# Patient Record
Sex: FEMALE | Race: WHITE | NOT HISPANIC OR LATINO | Employment: FULL TIME | URBAN - METROPOLITAN AREA
[De-identification: names, ages, dates, MRNs, and addresses within clinical notes are randomized per-mention and may not be internally consistent; named-entity substitution may affect disease eponyms.]

---

## 2017-01-14 ENCOUNTER — OFFICE VISIT (OUTPATIENT)
Dept: FAMILY MEDICINE CLINIC | Facility: CLINIC | Age: 57
End: 2017-01-14

## 2017-01-14 VITALS
DIASTOLIC BLOOD PRESSURE: 78 MMHG | HEART RATE: 74 BPM | HEIGHT: 68 IN | BODY MASS INDEX: 35.15 KG/M2 | TEMPERATURE: 97.7 F | WEIGHT: 231.9 LBS | SYSTOLIC BLOOD PRESSURE: 110 MMHG | OXYGEN SATURATION: 99 %

## 2017-01-14 DIAGNOSIS — I10 HYPERTENSION, ESSENTIAL: ICD-10-CM

## 2017-01-14 DIAGNOSIS — E78.49 OTHER HYPERLIPIDEMIA: ICD-10-CM

## 2017-01-14 DIAGNOSIS — E11.9 DIABETES MELLITUS WITHOUT COMPLICATION (HCC): Primary | ICD-10-CM

## 2017-01-14 PROCEDURE — 99214 OFFICE O/P EST MOD 30 MIN: CPT | Performed by: INTERNAL MEDICINE

## 2017-01-14 RX ORDER — LEVOTHYROXINE SODIUM 0.12 MG/1
125 TABLET ORAL DAILY
Qty: 30 TABLET | Refills: 5 | Status: SHIPPED | OUTPATIENT
Start: 2017-01-14 | End: 2017-10-12 | Stop reason: SDUPTHER

## 2017-01-14 RX ORDER — OXYBUTYNIN CHLORIDE 5 MG/1
5 TABLET ORAL 3 TIMES DAILY
Qty: 30 TABLET | Refills: 5 | Status: SHIPPED | OUTPATIENT
Start: 2017-01-14 | End: 2019-01-21

## 2017-01-14 RX ORDER — CITALOPRAM 40 MG/1
40 TABLET ORAL DAILY
Qty: 30 TABLET | Refills: 5 | Status: SHIPPED | OUTPATIENT
Start: 2017-01-14 | End: 2017-09-16 | Stop reason: SDUPTHER

## 2017-01-14 RX ORDER — GLIMEPIRIDE 4 MG/1
4 TABLET ORAL 2 TIMES DAILY
Qty: 60 TABLET | Refills: 5 | Status: SHIPPED | OUTPATIENT
Start: 2017-01-14 | End: 2017-11-12 | Stop reason: SDUPTHER

## 2017-01-14 RX ORDER — ATORVASTATIN CALCIUM 20 MG/1
20 TABLET, FILM COATED ORAL DAILY
Qty: 30 TABLET | Refills: 5 | Status: SHIPPED | OUTPATIENT
Start: 2017-01-14 | End: 2018-03-30 | Stop reason: SDUPTHER

## 2017-01-14 RX ORDER — CITALOPRAM 20 MG/1
20 TABLET ORAL DAILY
Qty: 30 TABLET | Refills: 5 | Status: SHIPPED | OUTPATIENT
Start: 2017-01-14 | End: 2017-09-16 | Stop reason: SDUPTHER

## 2017-01-14 RX ORDER — BUSPIRONE HYDROCHLORIDE 5 MG/1
5 TABLET ORAL DAILY
Qty: 30 TABLET | Refills: 5 | Status: SHIPPED | OUTPATIENT
Start: 2017-01-14 | End: 2017-11-12 | Stop reason: SDUPTHER

## 2017-01-14 NOTE — PROGRESS NOTES
Subjective   Eunice Ennis is a 56 y.o. female.   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  Did get invokana samples which helped she ran a samples on a chance to get back in.  Overall feels fair we did discuss labs that were done a week or so ago.  We will definitely want to restart her invokana.    Review of Systems   All other systems reviewed and are negative.      Objective   Vitals:    01/14/17 0823   BP: 110/78   Pulse: 74   Temp: 97.7 °F (36.5 °C)   SpO2: 99%   Weight: 231 lb 14.4 oz (105 kg)     Physical Exam   Constitutional: She appears well-developed and well-nourished.   Cardiovascular: Normal rate, regular rhythm and normal heart sounds.    Pulmonary/Chest: Effort normal and breath sounds normal.   Abdominal: Soft. Bowel sounds are normal.   Nursing note and vitals reviewed.      No results found for: INR    Procedures    Assessment/Plan   1.  Type 2 diabetes plan we will add invokana get follow-up hemoglobin A1c in 3 months both samples and a coupon were given patient with invokana 100 she is let us know if this does not well covered on her insurance plan with coupon.    2.  High blood pressure continue present medication recheck in 6 months    3.  Hyperlipidemia plan continue present medication recheck in 6 months

## 2017-01-14 NOTE — MR AVS SNAPSHOT
Eunice Ennis   1/14/2017 8:15 AM   Office Visit    Dept Phone:  [phone]   Encounter #:  [id number]    Provider:  Manny Lyn Jr., MD   Department:  [facility]                Your Full Care Plan              Today's Medication Changes          These changes are accurate as of: 1/14/17  8:53 AM.  If you have any questions, ask your nurse or doctor.               Medication(s)that have changed:     atorvastatin 20 MG tablet   Commonly known as:  LIPITOR   Take 1 tablet by mouth Daily.   What changed:  when to take this       busPIRone 5 MG tablet   Commonly known as:  BUSPAR   Take 1 tablet by mouth Daily.   What changed:  See the new instructions.       * citalopram 20 MG tablet   Commonly known as:  CeleXA   Take 1 tablet by mouth Daily.   What changed:  when to take this       * citalopram 40 MG tablet   Commonly known as:  CeleXA   Take 1 tablet by mouth Daily.   What changed:  when to take this       glimepiride 4 MG tablet   Commonly known as:  AMARYL   Take 1 tablet by mouth 2 (Two) Times a Day.   What changed:  See the new instructions.       levothyroxine 125 MCG tablet   Commonly known as:  SYNTHROID, LEVOTHROID   Take 1 tablet by mouth Daily.   What changed:  when to take this       linagliptin 5 MG tablet tablet   Commonly known as:  TRADJENTA   Take 1 tablet by mouth Daily.   What changed:  See the new instructions.       metFORMIN 500 MG tablet   Commonly known as:  GLUCOPHAGE   TAKE 2 TABLETS BY MOUTH TWO TIMES A DAY WITH MEALS   What changed:  See the new instructions.       oxybutynin 5 MG tablet   Commonly known as:  DITROPAN   Take 1 tablet by mouth 3 (Three) Times a Day.   What changed:  See the new instructions.       * Notice:  This list has 2 medication(s) that are the same as other medications prescribed for you. Read the directions carefully, and ask your doctor or other care provider to review them with you.         Stop taking medication(s)listed here:     ACCU-CHEK BOBBY PLUS test strip   Generic drug:  glucose blood           dextromethorphan-guaifenesin  MG per 12 hr tablet   Commonly known as:  MUCINEX DM           mupirocin 2 % nasal ointment   Commonly known as:  BACTROBAN           rosuvastatin 10 MG tablet   Commonly known as:  CRESTOR                Where to Get Your Medications      These medications were sent to [address] [place] [address] AT [facility] & (NADJA STEINBERG) - [phone]  - [phone] [address] [address]     Phone:  [phone]     atorvastatin 20 MG tablet    busPIRone 5 MG tablet    Canagliflozin 100 MG tablet    citalopram 20 MG tablet    citalopram 40 MG tablet    glimepiride 4 MG tablet    levothyroxine 125 MCG tablet    linagliptin 5 MG tablet tablet    metFORMIN 500 MG tablet    oxybutynin 5 MG tablet                  Your Updated Medication List          This list is accurate as of: 1/14/17  8:53 AM.  Always use your most recent med list.                atorvastatin 20 MG tablet   Commonly known as:  LIPITOR   Take 1 tablet by mouth Daily.       budesonide-formoterol 160-4.5 MCG/ACT inhaler   Commonly known as:  SYMBICORT   Inhale 2 puffs 2 (two) times a day.       busPIRone 5 MG tablet   Commonly known as:  BUSPAR   Take 1 tablet by mouth Daily.       Canagliflozin 100 MG tablet   Commonly known as:  INVOKANA   Take 100 mg by mouth Daily.       * citalopram 20 MG tablet   Commonly known as:  CeleXA   Take 1 tablet by mouth Daily.       * citalopram 40 MG tablet   Commonly known as:  CeleXA   Take 1 tablet by mouth Daily.       glimepiride 4 MG tablet   Commonly known as:  AMARYL   Take 1 tablet by mouth 2 (Two) Times a Day.       ibuprofen 800 MG tablet   Commonly known as:  ADVIL,MOTRIN       levothyroxine 125 MCG tablet   Commonly known as:  SYNTHROID, LEVOTHROID   Take 1 tablet by mouth Daily.       linagliptin 5 MG tablet  tablet   Commonly known as:  TRADJENTA   Take 1 tablet by mouth Daily.       loratadine 10 MG tablet   Commonly known as:  CLARITIN   Take 1 tablet by mouth daily.       metFORMIN 500 MG tablet   Commonly known as:  GLUCOPHAGE   TAKE 2 TABLETS BY MOUTH TWO TIMES A DAY WITH MEALS       oxybutynin 5 MG tablet   Commonly known as:  DITROPAN   Take 1 tablet by mouth 3 (Three) Times a Day.       * Notice:  This list has 2 medication(s) that are the same as other medications prescribed for you. Read the directions carefully, and ask your doctor or other care provider to review them with you.            You Were Diagnosed With        Codes Comments    Diabetes mellitus without complication    -  Primary ICD-10-CM: E11.9  ICD-9-CM: 250.00     Hypertension, essential     ICD-10-CM: I10  ICD-9-CM: 401.9     Other hyperlipidemia     ICD-10-CM: E78.4  ICD-9-CM: 272.4       Instructions     None    Patient Instructions History      Upcoming Appointments     Visit Type Date Time Department    OFFICE VISIT 2017  8:15 AM [facility]    OFFICE VISIT 2017  8:15 AM [facility] Signup     [facility] allows you to send messages to your doctor, view your test results, renew your prescriptions, schedule appointments, and more. To sign up, go to [employer] and click on the Sign Up Now link in the New User? box. Enter your [employer] Activation Code exactly as it appears below along with the last four digits of your Social Security Number and your Date of Birth () to complete the sign-up process. If you do not sign up before the expiration date, you must request a new code.    [employer] Activation Code: [email or web address]  Expires: 2017  8:53 AM    If you have questions, you can email [email or web address] or call [phone] to talk to our [employer] staff. Remember, [employer] is NOT to be used for urgent needs. For medical emergencies, dial 911.               Other Info from  "Your Visit           Your Appointments     Apr 22, 2017  8:15 AM EDT   Office Visit with Manny Lyn Jr., MD   [facility] (--)    [address] [place] [id number] [phone]           Arrive 15 minutes prior to appointment.              Allergies     No Known Allergies      Reason for Visit     Diabetes           Vital Signs     Blood Pressure Pulse Temperature Height Weight Oxygen Saturation    110/78 (BP Location: Left arm, Patient Position: Sitting, Cuff Size: Adult) 74 97.7 °F (36.5 °C) (Oral) 68\" (172.7 cm) 231 lb 14.4 oz (105 kg) 99%    Body Mass Index Smoking Status                35.26 kg/m2 Former Smoker          Problems and Diagnoses Noted     Diabetes mellitus without complication    -  Primary    High blood pressure        Other hyperlipidemia            "

## 2017-01-25 RX ORDER — ATORVASTATIN CALCIUM 20 MG/1
TABLET, FILM COATED ORAL
Qty: 30 TABLET | Refills: 2 | Status: SHIPPED | OUTPATIENT
Start: 2017-01-25 | End: 2018-03-15 | Stop reason: SDUPTHER

## 2017-02-13 ENCOUNTER — TELEPHONE (OUTPATIENT)
Dept: FAMILY MEDICINE CLINIC | Facility: CLINIC | Age: 57
End: 2017-02-13

## 2017-03-20 ENCOUNTER — TELEPHONE (OUTPATIENT)
Dept: FAMILY MEDICINE CLINIC | Facility: CLINIC | Age: 57
End: 2017-03-20

## 2017-03-20 NOTE — TELEPHONE ENCOUNTER
LM INFORMING NO SAMPLES COUPON UP FRONT    ----- Message from Berkley Chang sent at 3/20/2017  9:38 AM EDT -----  Contact: PATIENT [phone]  SAMPLES OF INVOKANNA    RX FOR THIS MEDICATION IS OVER $400.00

## 2017-04-03 ENCOUNTER — TELEPHONE (OUTPATIENT)
Dept: FAMILY MEDICINE CLINIC | Facility: CLINIC | Age: 57
End: 2017-04-03

## 2017-04-03 NOTE — TELEPHONE ENCOUNTER
----- Message from Manny Lyn Jr., MD sent at 4/3/2017 12:27 PM EDT -----  Contact: PT  Can give trial of farxiga initially as samples 5 mg initially can also give her prescription for that see if that is better covered by her insurance.  ----- Message -----     From: Carmel Espitia MA     Sent: 4/3/2017  10:34 AM       To: Manny Lyn Jr., MD        ----- Message -----     From: Amy Hill     Sent: 4/3/2017   9:59 AM       To: Carmel Espitia MA    PT SAID HER INVOKANA $ AND SHE CAN'T AFFORD THAT SO SHE NEEDS SOME MORE SAMPLES OR SOMETHING CHEAPER THAT IS EQUIVALENT TO THAT MEDICATION    [facility]    [phone]

## 2017-05-16 ENCOUNTER — TELEPHONE (OUTPATIENT)
Dept: FAMILY MEDICINE CLINIC | Facility: CLINIC | Age: 57
End: 2017-05-16

## 2017-06-28 ENCOUNTER — TELEPHONE (OUTPATIENT)
Dept: FAMILY MEDICINE CLINIC | Facility: CLINIC | Age: 57
End: 2017-06-28

## 2017-06-29 DIAGNOSIS — R77.9 ELEVATED BLOOD PROTEIN: Primary | ICD-10-CM

## 2017-07-08 ENCOUNTER — LAB (OUTPATIENT)
Dept: FAMILY MEDICINE CLINIC | Facility: CLINIC | Age: 57
End: 2017-07-08

## 2017-07-08 DIAGNOSIS — R77.9 ELEVATED BLOOD PROTEIN: ICD-10-CM

## 2017-07-08 PROCEDURE — 36415 COLL VENOUS BLD VENIPUNCTURE: CPT | Performed by: INTERNAL MEDICINE

## 2017-07-10 LAB
ALBUMIN SERPL-MCNC: 3.9 G/DL (ref 2.9–4.4)
ALBUMIN/GLOB SERPL: 1.2 {RATIO} (ref 0.7–1.7)
ALPHA1 GLOB FLD ELPH-MCNC: 0.2 G/DL (ref 0–0.4)
ALPHA2 GLOB SERPL ELPH-MCNC: 0.8 G/DL (ref 0.4–1)
B-GLOBULIN SERPL ELPH-MCNC: 0.9 G/DL (ref 0.7–1.3)
GAMMA GLOB SERPL ELPH-MCNC: 1.5 G/DL (ref 0.4–1.8)
GLOBULIN SER CALC-MCNC: 3.3 G/DL (ref 2.2–3.9)
Lab: NORMAL
M-SPIKE: NORMAL G/DL
PROT PATTERN SERPL ELPH-IMP: NORMAL
PROT SERPL-MCNC: 7.2 G/DL (ref 6–8.5)

## 2017-08-07 ENCOUNTER — TRANSCRIBE ORDERS (OUTPATIENT)
Dept: ADMINISTRATIVE | Facility: HOSPITAL | Age: 57
End: 2017-08-07

## 2017-08-07 DIAGNOSIS — Z12.31 VISIT FOR SCREENING MAMMOGRAM: Primary | ICD-10-CM

## 2017-08-14 RX ORDER — LINAGLIPTIN 5 MG/1
TABLET, FILM COATED ORAL
Qty: 30 TABLET | Refills: 4 | Status: SHIPPED | OUTPATIENT
Start: 2017-08-14 | End: 2018-01-17 | Stop reason: SDUPTHER

## 2017-09-18 RX ORDER — CITALOPRAM 20 MG/1
TABLET ORAL
Qty: 30 TABLET | Refills: 4 | Status: SHIPPED | OUTPATIENT
Start: 2017-09-18 | End: 2018-02-25 | Stop reason: SDUPTHER

## 2017-09-18 RX ORDER — CITALOPRAM 40 MG/1
TABLET ORAL
Qty: 30 TABLET | Refills: 4 | Status: SHIPPED | OUTPATIENT
Start: 2017-09-18 | End: 2018-02-25 | Stop reason: SDUPTHER

## 2017-09-30 ENCOUNTER — HOSPITAL ENCOUNTER (OUTPATIENT)
Dept: MAMMOGRAPHY | Facility: HOSPITAL | Age: 57
Discharge: HOME | End: 2017-09-30
Attending: INTERNAL MEDICINE | Admitting: INTERNAL MEDICINE

## 2017-09-30 DIAGNOSIS — Z12.31 VISIT FOR SCREENING MAMMOGRAM: ICD-10-CM

## 2017-09-30 PROCEDURE — G0202 SCR MAMMO BI INCL CAD: HCPCS

## 2017-09-30 PROCEDURE — 77063 BREAST TOMOSYNTHESIS BI: CPT

## 2017-10-12 RX ORDER — LEVOTHYROXINE SODIUM 0.12 MG/1
TABLET ORAL
Qty: 30 TABLET | Refills: 4 | Status: SHIPPED | OUTPATIENT
Start: 2017-10-12 | End: 2018-05-07 | Stop reason: SDUPTHER

## 2017-11-13 RX ORDER — BUSPIRONE HYDROCHLORIDE 5 MG/1
TABLET ORAL
Qty: 30 TABLET | Refills: 4 | Status: SHIPPED | OUTPATIENT
Start: 2017-11-13 | End: 2018-03-15 | Stop reason: ALTCHOICE

## 2017-11-13 RX ORDER — GLIMEPIRIDE 4 MG/1
TABLET ORAL
Qty: 60 TABLET | Refills: 4 | Status: SHIPPED | OUTPATIENT
Start: 2017-11-13 | End: 2019-01-21

## 2017-12-22 ENCOUNTER — TELEPHONE (OUTPATIENT)
Dept: FAMILY MEDICINE CLINIC | Facility: CLINIC | Age: 57
End: 2017-12-22

## 2017-12-22 RX ORDER — BUDESONIDE AND FORMOTEROL FUMARATE DIHYDRATE 160; 4.5 UG/1; UG/1
2 AEROSOL RESPIRATORY (INHALATION)
Qty: 1 INHALER | Refills: 12 | Status: SHIPPED | OUTPATIENT
Start: 2017-12-22 | End: 2022-07-12

## 2018-01-17 RX ORDER — LINAGLIPTIN 5 MG/1
TABLET, FILM COATED ORAL
Qty: 21 TABLET | Refills: 0 | Status: SHIPPED | OUTPATIENT
Start: 2018-01-17 | End: 2018-02-16 | Stop reason: SDUPTHER

## 2018-02-16 RX ORDER — LINAGLIPTIN 5 MG/1
TABLET, FILM COATED ORAL
Qty: 14 TABLET | Refills: 0 | Status: SHIPPED | OUTPATIENT
Start: 2018-02-16 | End: 2019-01-21

## 2018-02-26 RX ORDER — CITALOPRAM 20 MG/1
TABLET ORAL
Qty: 30 TABLET | Refills: 3 | Status: SHIPPED | OUTPATIENT
Start: 2018-02-26 | End: 2019-01-21

## 2018-02-26 RX ORDER — CITALOPRAM 40 MG/1
TABLET ORAL
Qty: 30 TABLET | Refills: 3 | Status: SHIPPED | OUTPATIENT
Start: 2018-02-26 | End: 2018-03-15 | Stop reason: ALTCHOICE

## 2018-03-15 ENCOUNTER — OFFICE VISIT (OUTPATIENT)
Dept: FAMILY MEDICINE CLINIC | Facility: CLINIC | Age: 58
End: 2018-03-15

## 2018-03-15 VITALS
HEART RATE: 88 BPM | BODY MASS INDEX: 35.71 KG/M2 | TEMPERATURE: 97.7 F | DIASTOLIC BLOOD PRESSURE: 68 MMHG | OXYGEN SATURATION: 98 % | SYSTOLIC BLOOD PRESSURE: 130 MMHG | HEIGHT: 68 IN | WEIGHT: 235.6 LBS

## 2018-03-15 DIAGNOSIS — F32.A DEPRESSION, UNSPECIFIED DEPRESSION TYPE: Primary | ICD-10-CM

## 2018-03-15 PROCEDURE — 99213 OFFICE O/P EST LOW 20 MIN: CPT | Performed by: INTERNAL MEDICINE

## 2018-03-15 RX ORDER — VENLAFAXINE HYDROCHLORIDE 37.5 MG/1
CAPSULE, EXTENDED RELEASE ORAL
Qty: 60 CAPSULE | Refills: 0 | Status: SHIPPED | OUTPATIENT
Start: 2018-03-15 | End: 2018-03-18 | Stop reason: SDUPTHER

## 2018-03-15 RX ORDER — SOLIFENACIN SUCCINATE 5 MG/1
TABLET, FILM COATED ORAL
COMMUNITY
Start: 2018-01-18

## 2018-03-15 RX ORDER — INSULIN DETEMIR 100 [IU]/ML
18 INJECTION, SOLUTION SUBCUTANEOUS
COMMUNITY
Start: 2018-02-12 | End: 2019-01-21

## 2018-03-15 RX ORDER — INSULIN LISPRO 100 [IU]/ML
INJECTION, SOLUTION INTRAVENOUS; SUBCUTANEOUS
COMMUNITY
Start: 2018-02-13 | End: 2022-07-12

## 2018-03-15 NOTE — PROGRESS NOTES
Subjective   Eunice Ennis is a 57 y.o. female.   Patient with increased sleep long-term Celexa and BuSpar for anxiety and depression.  Feels like these are not effective.  History of Present Illness as above has used Zoloft before which did not seem to work well some stressors resulting in some forthcoming legal issues.  Also still dealing with death of her mother from a year ago.  Describes 2017 asked the year from hell.  Mainly in regard to her mother's health.  Although fewer stressors at present.  Increased sleep feeling somewhat down her flat no thoughts of hurting self or hurting others.  Is still taking her Celexa and BuSpar    Review of Systems   Constitutional: Positive for fatigue.   Psychiatric/Behavioral: Positive for agitation and dysphoric mood. The patient is nervous/anxious.    All other systems reviewed and are negative.      Objective   Vitals:    03/15/18 1559   BP: 130/68   Pulse: 88   Temp: 97.7 °F (36.5 °C)   SpO2: 98%   Weight: 107 kg (235 lb 9.6 oz)     Physical Exam   Constitutional: She appears well-developed and well-nourished.   HENT:   Head: Normocephalic and atraumatic.   Cardiovascular: Normal rate, regular rhythm and normal heart sounds.    Pulmonary/Chest: Effort normal and breath sounds normal.   Nursing note and vitals reviewed.      No results found for: INR    Procedures    Assessment/Plan     Depression plan we will start a medication Effexor.    For this patient stop her Celexa and BuSpar for the next 5 days time.  Then begin Effexor X are 37.5 mg daily morning for 1 week then go to 2 tabs every morning quantity 60 call regarding status in one month's time further follow-up contingent on how she does.  If she does have side effects she is let me know.    Much of this encounter note is an electronic transcription/translation of spoken language to printed text.  The electronic translation of spoken language may permit erroneous, or at times, nonsensical words or phrases to be  inadvertently transcribed.  Although I have reviewed the note for such errors, some may still exist.

## 2018-03-19 ENCOUNTER — TELEPHONE (OUTPATIENT)
Dept: FAMILY MEDICINE CLINIC | Facility: CLINIC | Age: 58
End: 2018-03-19

## 2018-03-19 RX ORDER — VENLAFAXINE HYDROCHLORIDE 37.5 MG/1
CAPSULE, EXTENDED RELEASE ORAL
Qty: 7 CAPSULE | Refills: 0 | Status: SHIPPED | OUTPATIENT
Start: 2018-03-19 | End: 2019-02-01

## 2018-03-19 NOTE — TELEPHONE ENCOUNTER
PATIENT CANNOT TAKE EFFEXOR, ITS MAKING HER DIZZY, INSOMNIA AND MAKES HER FEEL LIKE SHE GOT STUNG BY A BEE.

## 2018-03-20 ENCOUNTER — TELEPHONE (OUTPATIENT)
Dept: FAMILY MEDICINE CLINIC | Facility: CLINIC | Age: 58
End: 2018-03-20

## 2018-03-20 NOTE — TELEPHONE ENCOUNTER
PT STATES SHE CANNOT TAKE THE RX VENLAFAXINE XR 37.5 MG  PT STATES THE SIDE EFFECTS FOR HER WERE TOO MUCH TO FUNCTION WITH  DIZZINESS, NO SLEEP, SHAKINESS, NOT ABLE TO DRIVE WITHOUT FALLING ASLEEP   PT ASKING IF THERE IS SOMETHING ELSE THAT SHE CAN TAKE THAT WOULDN'T HAVE THESE SIDE EFFECTS?

## 2018-03-22 RX ORDER — ESCITALOPRAM OXALATE 10 MG/1
10 TABLET ORAL DAILY
Qty: 30 TABLET | Refills: 2 | Status: SHIPPED | OUTPATIENT
Start: 2018-03-22 | End: 2018-04-06 | Stop reason: DRUGHIGH

## 2018-03-22 NOTE — TELEPHONE ENCOUNTER
No Effexor for 5 days then begin Lexapro 10 mg half tablet daily for 1 week then go to whole tablet daily call regarding status in one month's time if side effects beforehand

## 2018-03-22 NOTE — TELEPHONE ENCOUNTER
Confirm that we are going to be getting the INR is in dealing with those now instead of the Coumadin clinic until she is able to go back to the Coumadin clinic regularly?   What else this patient tried, has she tried Fetzima

## 2018-03-30 ENCOUNTER — TELEPHONE (OUTPATIENT)
Dept: FAMILY MEDICINE CLINIC | Facility: CLINIC | Age: 58
End: 2018-03-30

## 2018-03-30 NOTE — TELEPHONE ENCOUNTER
WAS PUT ON LEXAPRO 10 MG FEELS LIKE IT IS WORKING BUT FEELS LIKE IT NEEDS TO BE DOUBLED SHE IS STILL REALLY EDGY AND SNAPPY AT PEOPLE, CAN YOU CALL PT AND ADVISE WHAT TO DO ?

## 2018-03-30 NOTE — TELEPHONE ENCOUNTER
Increase to 15 mg meaning one and 1 half tablets for 2 weeks then go up to 20 mg call  re status 1mo

## 2018-04-02 RX ORDER — ATORVASTATIN CALCIUM 20 MG/1
TABLET, FILM COATED ORAL
Qty: 30 TABLET | Refills: 3 | Status: SHIPPED | OUTPATIENT
Start: 2018-04-02 | End: 2018-09-11 | Stop reason: SDUPTHER

## 2018-04-06 ENCOUNTER — TELEPHONE (OUTPATIENT)
Dept: FAMILY MEDICINE CLINIC | Facility: CLINIC | Age: 58
End: 2018-04-06

## 2018-04-06 RX ORDER — ESCITALOPRAM OXALATE 20 MG/1
20 TABLET ORAL DAILY
Qty: 30 TABLET | Refills: 5 | Status: SHIPPED | OUTPATIENT
Start: 2018-04-06 | End: 2018-12-15 | Stop reason: SDUPTHER

## 2018-04-06 RX ORDER — BUSPIRONE HYDROCHLORIDE 10 MG/1
10 TABLET ORAL 2 TIMES DAILY
Qty: 60 TABLET | Refills: 5 | Status: SHIPPED | OUTPATIENT
Start: 2018-04-06 | End: 2018-12-15 | Stop reason: SDUPTHER

## 2018-04-06 RX ORDER — BUSPIRONE HYDROCHLORIDE 5 MG/1
5 TABLET ORAL DAILY
Qty: 30 TABLET | Refills: 2 | Status: SHIPPED | OUTPATIENT
Start: 2018-04-06 | End: 2018-04-06 | Stop reason: DRUGHIGH

## 2018-04-06 NOTE — TELEPHONE ENCOUNTER
PATIENT IS ON LEXAPRO 20 MG  PATIENT IS STILL SNAPPY AT THE END OF THE DAY. PATIENT TOOK 30 MG YESTERDAY AND FEELS A LITTLE BETTER.    PATIENT NEEDS A REFILL ON LEXAPRO 30 MG

## 2018-04-06 NOTE — TELEPHONE ENCOUNTER
Maximum dose of Lexapro is 20 mg a day so continue on just 20 mg a day.  Can add BuSpar 5 mg as a complementary or Wellbutrin to this for added benefit suggest trying BuSpar 5 mg half tablet twice a day for 1 week in addition to her Lexapro 20 and on the BuSpar after 1 week's time ago to whole tablet twice a day and call us in one month's regarding her status.

## 2018-04-10 RX ORDER — VENLAFAXINE HYDROCHLORIDE 75 MG/1
CAPSULE, EXTENDED RELEASE ORAL
Qty: 30 CAPSULE | Refills: 0 | Status: SHIPPED | OUTPATIENT
Start: 2018-04-10 | End: 2019-01-21

## 2018-05-07 RX ORDER — LEVOTHYROXINE SODIUM 0.12 MG/1
TABLET ORAL
Qty: 30 TABLET | Refills: 3 | Status: SHIPPED | OUTPATIENT
Start: 2018-05-07 | End: 2018-12-15 | Stop reason: SDUPTHER

## 2018-08-08 ENCOUNTER — TRANSCRIBE ORDERS (OUTPATIENT)
Dept: ADMINISTRATIVE | Facility: HOSPITAL | Age: 58
End: 2018-08-08

## 2018-08-08 DIAGNOSIS — Z12.31 VISIT FOR SCREENING MAMMOGRAM: Primary | ICD-10-CM

## 2018-09-11 RX ORDER — ATORVASTATIN CALCIUM 20 MG/1
TABLET, FILM COATED ORAL
Qty: 30 TABLET | Refills: 2 | Status: SHIPPED | OUTPATIENT
Start: 2018-09-11 | End: 2018-11-02 | Stop reason: SDUPTHER

## 2018-10-11 ENCOUNTER — TELEPHONE (OUTPATIENT)
Dept: FAMILY MEDICINE CLINIC | Facility: CLINIC | Age: 58
End: 2018-10-11

## 2018-10-11 NOTE — TELEPHONE ENCOUNTER
PATIENT SEEN DR. MERCHANT AND HAD TAKEN A CYST OF HER BLADDER. DR. MERCHANT PUT PATIENT ON VESICARE 5 MG tablet. PATIENT WANTS TO KNOW IF [facility] COULD REFILL THIS PATIENT NEEDS THIS CALLED INTO PHARMACY

## 2018-10-13 ENCOUNTER — HOSPITAL ENCOUNTER (OUTPATIENT)
Dept: MAMMOGRAPHY | Facility: HOSPITAL | Age: 58
Discharge: HOME | End: 2018-10-13
Attending: INTERNAL MEDICINE | Admitting: INTERNAL MEDICINE

## 2018-10-13 DIAGNOSIS — Z12.31 VISIT FOR SCREENING MAMMOGRAM: ICD-10-CM

## 2018-10-13 PROCEDURE — 77067 SCR MAMMO BI INCL CAD: CPT

## 2018-10-13 PROCEDURE — 77063 BREAST TOMOSYNTHESIS BI: CPT

## 2018-11-02 RX ORDER — ATORVASTATIN CALCIUM 20 MG/1
TABLET, FILM COATED ORAL
Qty: 90 TABLET | Refills: 1 | Status: SHIPPED | OUTPATIENT
Start: 2018-11-02

## 2018-12-17 RX ORDER — LEVOTHYROXINE SODIUM 0.12 MG/1
TABLET ORAL
Qty: 30 TABLET | Refills: 2 | Status: SHIPPED | OUTPATIENT
Start: 2018-12-17 | End: 2019-01-24 | Stop reason: SDUPTHER

## 2018-12-17 RX ORDER — ESCITALOPRAM OXALATE 20 MG/1
TABLET ORAL
Qty: 30 TABLET | Refills: 4 | Status: SHIPPED | OUTPATIENT
Start: 2018-12-17 | End: 2019-02-01

## 2018-12-17 RX ORDER — BUSPIRONE HYDROCHLORIDE 10 MG/1
TABLET ORAL
Qty: 60 TABLET | Refills: 4 | Status: SHIPPED | OUTPATIENT
Start: 2018-12-17 | End: 2019-11-17 | Stop reason: SDUPTHER

## 2019-01-21 ENCOUNTER — OFFICE VISIT (OUTPATIENT)
Dept: FAMILY MEDICINE CLINIC | Facility: CLINIC | Age: 59
End: 2019-01-21

## 2019-01-21 DIAGNOSIS — R10.13 DYSPEPSIA: Primary | ICD-10-CM

## 2019-01-21 DIAGNOSIS — R19.4 CHANGE IN BOWEL HABITS: ICD-10-CM

## 2019-01-21 DIAGNOSIS — E03.9 HYPOTHYROIDISM, UNSPECIFIED TYPE: ICD-10-CM

## 2019-01-21 DIAGNOSIS — F41.9 ANXIETY: ICD-10-CM

## 2019-01-21 LAB
ALBUMIN SERPL-MCNC: 4.3 G/DL (ref 3.5–5.2)
ALBUMIN/GLOB SERPL: 1.1 G/DL
ALP SERPL-CCNC: 79 U/L (ref 39–117)
ALT SERPL W P-5'-P-CCNC: 15 U/L (ref 1–33)
ANION GAP SERPL CALCULATED.3IONS-SCNC: 13.2 MMOL/L
AST SERPL-CCNC: 14 U/L (ref 1–32)
BILIRUB SERPL-MCNC: 0.3 MG/DL (ref 0.1–1.2)
BUN BLD-MCNC: 21 MG/DL (ref 6–20)
BUN/CREAT SERPL: 24.7 (ref 7–25)
CALCIUM SPEC-SCNC: 9.6 MG/DL (ref 8.6–10.5)
CHLORIDE SERPL-SCNC: 99 MMOL/L (ref 98–107)
CO2 SERPL-SCNC: 25.8 MMOL/L (ref 22–29)
CREAT BLD-MCNC: 0.85 MG/DL (ref 0.57–1)
GFR SERPL CREATININE-BSD FRML MDRD: 69 ML/MIN/1.73
GLOBULIN UR ELPH-MCNC: 4 GM/DL
GLUCOSE BLD-MCNC: 117 MG/DL (ref 65–99)
POTASSIUM BLD-SCNC: 3.9 MMOL/L (ref 3.5–5.2)
PROT SERPL-MCNC: 8.3 G/DL (ref 6–8.5)
SODIUM BLD-SCNC: 138 MMOL/L (ref 136–145)
TSH SERPL DL<=0.05 MIU/L-ACNC: 9.09 MIU/ML (ref 0.27–4.2)

## 2019-01-21 PROCEDURE — 99214 OFFICE O/P EST MOD 30 MIN: CPT | Performed by: INTERNAL MEDICINE

## 2019-01-21 PROCEDURE — 80053 COMPREHEN METABOLIC PANEL: CPT | Performed by: INTERNAL MEDICINE

## 2019-01-21 PROCEDURE — 85025 COMPLETE CBC W/AUTO DIFF WBC: CPT | Performed by: INTERNAL MEDICINE

## 2019-01-21 PROCEDURE — 84443 ASSAY THYROID STIM HORMONE: CPT | Performed by: INTERNAL MEDICINE

## 2019-01-21 RX ORDER — HEPATITIS A VACCINE 1440 [IU]/ML
INJECTION, SUSPENSION INTRAMUSCULAR
COMMUNITY
Start: 2018-11-08 | End: 2019-02-01

## 2019-01-21 NOTE — PROGRESS NOTES
Subjective   Eunice Ennis is a 58 y.o. female.    Some change in bowel habits over the last 2-3 months.  Also belching dyspepsia abdominal distention.  History of Present Illness patient really foul gas or belching.  No reported temperature with this does not had colonoscopy done somewhat recent change in bowel habits with mildly constipation.  No blood in the stool was she's trying to soak by history.  Does need thyroid checked today as well.  I haven't feeling somewhat edgy while the BuSpar and Lexapro she brought have helped somewhat she's not totally where she needs to be we will switch her from her Lexapro to trintellix patient is 5 mg and build up to 10 mg do want patient seen GI for further evaluation and probable colonoscopy.  Get screening lab today.  On thyroid medicine needs updated lab check  Family history positive for diabetes and hypertension.  Patient's former smoker quit 1 year ago no heavy alcohol.    Allergies are Septra which causes itching  Dyspepsia recent  Change in bowel  Still feels edgy  w lexapro  Review of Systems   Gastrointestinal: Positive for abdominal distention and constipation.   All other systems reviewed and are negative.      Objective   There were no vitals filed for this visit.      Physical Exam   Constitutional: She appears well-developed and well-nourished.   HENT:   Head: Normocephalic and atraumatic.   Eyes: Conjunctivae are normal. Pupils are equal, round, and reactive to light. No scleral icterus.   Cardiovascular: Normal rate, regular rhythm and normal heart sounds.   Pulmonary/Chest: Effort normal and breath sounds normal.   Abdominal: Soft. Bowel sounds are normal.   Neurological: She is alert.   Unremarkable gait and station   Skin: Skin is warm and dry.   Psychiatric: She has a normal mood and affect. Her behavior is normal.   Nursing note and vitals reviewed.      No results found for: INR    Procedures    Assessment/Plan 1.  Change in bowel habits plan refer to  Dr. Fitch regarding constipation try linzess 72 mg daily    2.  Dyspepsia plan await input from GI await pending lab    3.  Hypothyroidism await pending TSH is satisfactory have patient get rechecked in one years time    4.  Anxiety disorder we will switch patient from Lexapro to trintellix 5 mg for 3 weeks seeing up to 10 mg daily she is stopped Lexapro but continue her BuSpar call regarding status of 1 month and if side effects should occur in the interim    Much of this encounter note is an electronic transcription/translation of spoken language to printed text.  The electronic translation of spoken language may permit erroneous, or at times, nonsensical words or phrases to be inadvertently transcribed.  Although I have reviewed the note for such errors, some may still exist. If there are questions or for further clarification, please contact me.      trintellix  linzess

## 2019-01-22 LAB
ERYTHROCYTE [DISTWIDTH] IN BLOOD BY AUTOMATED COUNT: 12.7 % (ref 4.5–15)
HCT VFR BLD AUTO: 49.1 % (ref 31–42)
HGB BLD-MCNC: 15.7 G/DL (ref 12–18)
LYMPHOCYTES # BLD AUTO: 3.7 10*3/MM3 (ref 1.2–3.4)
LYMPHOCYTES NFR BLD AUTO: 39.1 % (ref 21–51)
MCH RBC QN AUTO: 29.2 PG (ref 26.1–33.1)
MCHC RBC AUTO-ENTMCNC: 32.1 G/DL (ref 33–37)
MCV RBC AUTO: 91.1 FL (ref 80–99)
MONOCYTES # BLD AUTO: 0.4 10*3/MM3 (ref 0.1–0.6)
MONOCYTES NFR BLD AUTO: 4.1 % (ref 2–9)
NEUTROPHILS # BLD AUTO: 5.4 10*3/MM3 (ref 1.4–6.5)
NEUTROPHILS NFR BLD AUTO: 56.8 % (ref 42–75)
PLATELET # BLD AUTO: 242 10*3/MM3 (ref 150–450)
PMV BLD AUTO: 8 FL (ref 7.1–10.5)
RBC # BLD AUTO: 5.39 10*6/MM3 (ref 4–6)
WBC NRBC COR # BLD: 9.5 10*3/MM3 (ref 4.5–10)

## 2019-01-24 DIAGNOSIS — E03.9 HYPOTHYROIDISM, UNSPECIFIED TYPE: Primary | ICD-10-CM

## 2019-01-24 RX ORDER — LEVOTHYROXINE SODIUM 0.15 MG/1
150 TABLET ORAL DAILY
Qty: 30 TABLET | Refills: 1 | Status: SHIPPED | OUTPATIENT
Start: 2019-01-24 | End: 2019-03-21 | Stop reason: SDUPTHER

## 2019-01-25 ENCOUNTER — TELEPHONE (OUTPATIENT)
Dept: FAMILY MEDICINE CLINIC | Facility: CLINIC | Age: 59
End: 2019-01-25

## 2019-01-25 NOTE — TELEPHONE ENCOUNTER
[facility] or the generic version

## 2019-01-25 NOTE — TELEPHONE ENCOUNTER
Was given linzess for constipation and has been using the bathroom so much she is having rectal pain/raw can you send in a cream?

## 2019-01-31 ENCOUNTER — TELEPHONE (OUTPATIENT)
Dept: FAMILY MEDICINE CLINIC | Facility: CLINIC | Age: 59
End: 2019-01-31

## 2019-01-31 NOTE — TELEPHONE ENCOUNTER
All but 1 SSRIs including Zoloft and Prozac Paxil Celexa we also have Wellbutrin in there so which of these has she tried an which these worked before as a substitute.

## 2019-01-31 NOTE — TELEPHONE ENCOUNTER
PT CALLED [facility] BECAUSE [facility] WILL NOT COVER TRINTELLIX. SO THEY GAVE HER 5 SIMILAR MEDS THAT THEY DO COVER TO TELL BTI    FLUOXETINE  PAROXETINE  BUPROPION  CITALOPRAM  SERTRALINE

## 2019-02-01 ENCOUNTER — TELEPHONE (OUTPATIENT)
Dept: FAMILY MEDICINE CLINIC | Facility: CLINIC | Age: 59
End: 2019-02-01

## 2019-02-01 RX ORDER — CITALOPRAM 40 MG/1
40 TABLET ORAL DAILY
Qty: 30 TABLET | Refills: 5 | Status: SHIPPED | OUTPATIENT
Start: 2019-02-01 | End: 2019-02-01

## 2019-02-01 RX ORDER — PAROXETINE 10 MG/1
10 TABLET, FILM COATED ORAL EVERY MORNING
Qty: 30 TABLET | Refills: 5 | Status: SHIPPED | OUTPATIENT
Start: 2019-02-01 | End: 2019-09-24 | Stop reason: ALTCHOICE

## 2019-02-01 NOTE — TELEPHONE ENCOUNTER
She is not weaning from Paxil until she is been on it a higher dose than 10 mg then would need to wean down if medicines stopped.  My  point is that this is one of these medications 1 really does need to wean down from

## 2019-02-01 NOTE — TELEPHONE ENCOUNTER
Paxil at 10 mg a day more patient that she goes higher doses she has to wean down from that.  Quantity 30 call regarding status in one month.

## 2019-02-01 NOTE — TELEPHONE ENCOUNTER
On Celexa the maximum is 40 mg so what I I would do is send in Celexa 40 mg half tablet daily for 2 weeks ago to whole tablet daily call regarding status in one month

## 2019-02-01 NOTE — TELEPHONE ENCOUNTER
Patient called and cannot take Zoloft so don't call that in, please call her and talk to her because she is stressing about this.. Would just like to be on Celexa 60 mg again if it is gonna be a problem to get her regulated, she is still edgy so and only has one pill left and needs something called in today BC she cannot go the whole weekend without medications she said

## 2019-02-01 NOTE — TELEPHONE ENCOUNTER
Dose not want the Celexa................Wants to try Paxil!!!    Said 40mg of Celexa will not work.........Please advise.

## 2019-02-05 ENCOUNTER — OFFICE VISIT (OUTPATIENT)
Dept: GASTROENTEROLOGY | Facility: CLINIC | Age: 59
End: 2019-02-05

## 2019-02-05 VITALS
HEIGHT: 68 IN | WEIGHT: 216 LBS | DIASTOLIC BLOOD PRESSURE: 86 MMHG | BODY MASS INDEX: 32.74 KG/M2 | SYSTOLIC BLOOD PRESSURE: 116 MMHG

## 2019-02-05 DIAGNOSIS — K59.00 CONSTIPATION, UNSPECIFIED CONSTIPATION TYPE: Primary | ICD-10-CM

## 2019-02-05 DIAGNOSIS — R10.13 DYSPEPSIA: ICD-10-CM

## 2019-02-05 PROCEDURE — 99204 OFFICE O/P NEW MOD 45 MIN: CPT | Performed by: INTERNAL MEDICINE

## 2019-02-05 RX ORDER — SODIUM CHLORIDE, SODIUM LACTATE, POTASSIUM CHLORIDE, CALCIUM CHLORIDE 600; 310; 30; 20 MG/100ML; MG/100ML; MG/100ML; MG/100ML
30 INJECTION, SOLUTION INTRAVENOUS CONTINUOUS
Status: CANCELLED | OUTPATIENT
Start: 2019-02-21

## 2019-02-05 NOTE — PROGRESS NOTES
Chief Complaint   Patient presents with   • New patient   • Change in bowel habits, constipated   • Dyspepsia     Subjective      HPI     Eunice Ennis is a 58 y.o. female who presents for evaluation of change in bowel habit.  Pt reports constipation and bloating for 6 mos.  Some occasional rectal bleeding, reports hemorrhoids for years since .  Symptoms seem to be worse since starting insulin therapy.  No prior EGD or colonoscopy.  She was given rx for Linzess but only took one dose as felt it was too harsh.       Past Medical History:   Diagnosis Date   • Anxiety    • Bladder disorder    • Diabetes mellitus (CMS/HCC)    • Hyperlipidemia    • Hypothyroidism    • MRSA (methicillin resistant Staphylococcus aureus)    • Thyroid disease        Current Outpatient Medications:   •  atorvastatin (LIPITOR) 20 MG tablet, TAKE 1 TABLET BY MOUTH ONE TIME A DAY , Disp: 90 tablet, Rfl: 1  •  budesonide-formoterol (SYMBICORT) 160-4.5 MCG/ACT inhaler, Inhale 2 puffs 2 (Two) Times a Day., Disp: 1 inhaler, Rfl: 12  •  busPIRone (BUSPAR) 10 MG tablet, TAKE ONE TABLET BY MOUTH TWICE A DAY, Disp: 60 tablet, Rfl: 4  •  HUMALOG KWIKPEN 100 UNIT/ML solution pen-injector, ss, Disp: , Rfl:   •  hydrocortisone-pramoxine (PROCTOFOAM HC) 1-1 % rectal foam, Insert 1 applicator into the rectum 2 (Two) Times a Day., Disp: 10 g, Rfl: 0  •  levothyroxine (SYNTHROID) 150 MCG tablet, Take 1 tablet by mouth Daily., Disp: 30 tablet, Rfl: 1  •  loratadine (CLARITIN) 10 MG tablet, Take 1 tablet by mouth daily., Disp: 30 tablet, Rfl: 5  •  metFORMIN (GLUCOPHAGE) 500 MG tablet, TAKE 2 TABLETS BY MOUTH TWO TIMES A DAY WITH MEALS, Disp: 120 tablet, Rfl: 5  •  PARoxetine (PAXIL) 10 MG tablet, Take 1 tablet by mouth Every Morning., Disp: 30 tablet, Rfl: 5  •  Semaglutide (OZEMPIC SC), Inject 5 Units/day under the skin. weekly, Disp: , Rfl:   •  VESICARE 5 MG tablet, , Disp: , Rfl:   No Known Allergies       Social History     Socioeconomic History   •  Marital status:      Spouse name: Not on file   • Number of children: Not on file   • Years of education: Not on file   • Highest education level: Not on file   Social Needs   • Financial resource strain: Not on file   • Food insecurity - worry: Not on file   • Food insecurity - inability: Not on file   • Transportation needs - medical: Not on file   • Transportation needs - non-medical: Not on file   Occupational History   • Not on file   Tobacco Use   • Smoking status: Former Smoker     Years: 1.00     Types: Cigarettes   • Smokeless tobacco: Never Used   Substance and Sexual Activity   • Alcohol use: No   • Drug use: No   • Sexual activity: Defer   Other Topics Concern   • Not on file   Social History Narrative   • Not on file     Family History   Problem Relation Age of Onset   • Diabetes Mother    • Hypertension Sister      Review of Systems   Gastrointestinal: Positive for blood in stool and constipation.   All other systems reviewed and are negative.       Objective   Vitals:    02/05/19 1540   BP: 116/86     Physical Exam   Constitutional: She is oriented to person, place, and time. She appears well-developed and well-nourished.   HENT:   Head: Normocephalic and atraumatic.   Mouth/Throat: Oropharynx is clear and moist.   Eyes: EOM are normal. No scleral icterus.   Neck: Normal range of motion. Neck supple. No thyromegaly present.   Cardiovascular: Normal rate, regular rhythm and normal heart sounds. Exam reveals no gallop and no friction rub.   No murmur heard.  Pulmonary/Chest: Effort normal and breath sounds normal. She has no wheezes. She has no rales. She exhibits no tenderness.   Abdominal: Soft. Bowel sounds are normal. She exhibits no distension. There is no tenderness. There is no rebound and no guarding. No hernia.   Musculoskeletal: Normal range of motion. She exhibits no edema.   Lymphadenopathy:     She has no cervical adenopathy.   Neurological: She is alert and oriented to person,  place, and time.   Skin: Skin is warm and dry.   Psychiatric: She has a normal mood and affect. Judgment and thought content normal.   Vitals reviewed.       Assessment/Plan   Assessment:     1. Constipation, unspecified constipation type    2. Dyspepsia      Plan:   EGD and colonoscopy will be scheduled for pts change in bowel habit and upper abdominal bloating  Start daily fiber supplement        Rony Trimble M.D.  [facility]  [address] [place] [address]  Office: [phone]

## 2019-02-05 NOTE — H&P (VIEW-ONLY)
Chief Complaint   Patient presents with   • New patient   • Change in bowel habits, constipated   • Dyspepsia     Subjective      HPI     Eunice Ennis is a 58 y.o. female who presents for evaluation of change in bowel habit.  Pt reports constipation and bloating for 6 mos.  Some occasional rectal bleeding, reports hemorrhoids for years since .  Symptoms seem to be worse since starting insulin therapy.  No prior EGD or colonoscopy.  She was given rx for Linzess but only took one dose as felt it was too harsh.       Past Medical History:   Diagnosis Date   • Anxiety    • Bladder disorder    • Diabetes mellitus (CMS/HCC)    • Hyperlipidemia    • Hypothyroidism    • MRSA (methicillin resistant Staphylococcus aureus)    • Thyroid disease        Current Outpatient Medications:   •  atorvastatin (LIPITOR) 20 MG tablet, TAKE 1 TABLET BY MOUTH ONE TIME A DAY , Disp: 90 tablet, Rfl: 1  •  budesonide-formoterol (SYMBICORT) 160-4.5 MCG/ACT inhaler, Inhale 2 puffs 2 (Two) Times a Day., Disp: 1 inhaler, Rfl: 12  •  busPIRone (BUSPAR) 10 MG tablet, TAKE ONE TABLET BY MOUTH TWICE A DAY, Disp: 60 tablet, Rfl: 4  •  HUMALOG KWIKPEN 100 UNIT/ML solution pen-injector, ss, Disp: , Rfl:   •  hydrocortisone-pramoxine (PROCTOFOAM HC) 1-1 % rectal foam, Insert 1 applicator into the rectum 2 (Two) Times a Day., Disp: 10 g, Rfl: 0  •  levothyroxine (SYNTHROID) 150 MCG tablet, Take 1 tablet by mouth Daily., Disp: 30 tablet, Rfl: 1  •  loratadine (CLARITIN) 10 MG tablet, Take 1 tablet by mouth daily., Disp: 30 tablet, Rfl: 5  •  metFORMIN (GLUCOPHAGE) 500 MG tablet, TAKE 2 TABLETS BY MOUTH TWO TIMES A DAY WITH MEALS, Disp: 120 tablet, Rfl: 5  •  PARoxetine (PAXIL) 10 MG tablet, Take 1 tablet by mouth Every Morning., Disp: 30 tablet, Rfl: 5  •  Semaglutide (OZEMPIC SC), Inject 5 Units/day under the skin. weekly, Disp: , Rfl:   •  VESICARE 5 MG tablet, , Disp: , Rfl:   No Known Allergies       Social History     Socioeconomic History   •  Marital status:      Spouse name: Not on file   • Number of children: Not on file   • Years of education: Not on file   • Highest education level: Not on file   Social Needs   • Financial resource strain: Not on file   • Food insecurity - worry: Not on file   • Food insecurity - inability: Not on file   • Transportation needs - medical: Not on file   • Transportation needs - non-medical: Not on file   Occupational History   • Not on file   Tobacco Use   • Smoking status: Former Smoker     Years: 1.00     Types: Cigarettes   • Smokeless tobacco: Never Used   Substance and Sexual Activity   • Alcohol use: No   • Drug use: No   • Sexual activity: Defer   Other Topics Concern   • Not on file   Social History Narrative   • Not on file     Family History   Problem Relation Age of Onset   • Diabetes Mother    • Hypertension Sister      Review of Systems   Gastrointestinal: Positive for blood in stool and constipation.   All other systems reviewed and are negative.       Objective   Vitals:    02/05/19 1540   BP: 116/86     Physical Exam   Constitutional: She is oriented to person, place, and time. She appears well-developed and well-nourished.   HENT:   Head: Normocephalic and atraumatic.   Mouth/Throat: Oropharynx is clear and moist.   Eyes: EOM are normal. No scleral icterus.   Neck: Normal range of motion. Neck supple. No thyromegaly present.   Cardiovascular: Normal rate, regular rhythm and normal heart sounds. Exam reveals no gallop and no friction rub.   No murmur heard.  Pulmonary/Chest: Effort normal and breath sounds normal. She has no wheezes. She has no rales. She exhibits no tenderness.   Abdominal: Soft. Bowel sounds are normal. She exhibits no distension. There is no tenderness. There is no rebound and no guarding. No hernia.   Musculoskeletal: Normal range of motion. She exhibits no edema.   Lymphadenopathy:     She has no cervical adenopathy.   Neurological: She is alert and oriented to person,  place, and time.   Skin: Skin is warm and dry.   Psychiatric: She has a normal mood and affect. Judgment and thought content normal.   Vitals reviewed.       Assessment/Plan   Assessment:     1. Constipation, unspecified constipation type    2. Dyspepsia      Plan:   EGD and colonoscopy will be scheduled for pts change in bowel habit and upper abdominal bloating  Start daily fiber supplement        Rony Trimble M.D.  [facility]  [address] [place] [address]  Office: [phone]

## 2019-02-21 ENCOUNTER — HOSPITAL ENCOUNTER (OUTPATIENT)
Facility: HOSPITAL | Age: 59
Setting detail: HOSPITAL OUTPATIENT SURGERY
Discharge: HOME | End: 2019-02-21
Attending: INTERNAL MEDICINE | Admitting: INTERNAL MEDICINE

## 2019-02-21 ENCOUNTER — ANESTHESIA EVENT (OUTPATIENT)
Dept: GASTROENTEROLOGY | Facility: HOSPITAL | Age: 59
End: 2019-02-21

## 2019-02-21 ENCOUNTER — ANESTHESIA (OUTPATIENT)
Dept: GASTROENTEROLOGY | Facility: HOSPITAL | Age: 59
End: 2019-02-21

## 2019-02-21 VITALS
SYSTOLIC BLOOD PRESSURE: 128 MMHG | DIASTOLIC BLOOD PRESSURE: 77 MMHG | HEART RATE: 75 BPM | TEMPERATURE: 97.5 F | OXYGEN SATURATION: 75 % | RESPIRATION RATE: 16 BRPM

## 2019-02-21 DIAGNOSIS — K59.00 CONSTIPATION, UNSPECIFIED CONSTIPATION TYPE: ICD-10-CM

## 2019-02-21 DIAGNOSIS — R10.13 DYSPEPSIA: ICD-10-CM

## 2019-02-21 LAB — GLUCOSE BLDC GLUCOMTR-MCNC: 92 MG/DL (ref 70–130)

## 2019-02-21 PROCEDURE — 43239 EGD BIOPSY SINGLE/MULTIPLE: CPT | Performed by: INTERNAL MEDICINE

## 2019-02-21 PROCEDURE — 82962 GLUCOSE BLOOD TEST: CPT

## 2019-02-21 PROCEDURE — 88312 SPECIAL STAINS GROUP 1: CPT | Performed by: INTERNAL MEDICINE

## 2019-02-21 PROCEDURE — 88313 SPECIAL STAINS GROUP 2: CPT | Performed by: INTERNAL MEDICINE

## 2019-02-21 PROCEDURE — 25010000002 PROPOFOL 10 MG/ML EMULSION: Performed by: NURSE ANESTHETIST, CERTIFIED REGISTERED

## 2019-02-21 PROCEDURE — 88341 IMHCHEM/IMCYTCHM EA ADD ANTB: CPT | Performed by: INTERNAL MEDICINE

## 2019-02-21 PROCEDURE — 45378 DIAGNOSTIC COLONOSCOPY: CPT | Performed by: INTERNAL MEDICINE

## 2019-02-21 PROCEDURE — 88342 IMHCHEM/IMCYTCHM 1ST ANTB: CPT | Performed by: INTERNAL MEDICINE

## 2019-02-21 PROCEDURE — 88305 TISSUE EXAM BY PATHOLOGIST: CPT | Performed by: INTERNAL MEDICINE

## 2019-02-21 RX ORDER — PROMETHAZINE HYDROCHLORIDE 25 MG/1
25 SUPPOSITORY RECTAL ONCE AS NEEDED
Status: DISCONTINUED | OUTPATIENT
Start: 2019-02-21 | End: 2019-02-21 | Stop reason: HOSPADM

## 2019-02-21 RX ORDER — SUCRALFATE ORAL 1 G/10ML
1 SUSPENSION ORAL
Qty: 560 ML | Refills: 0 | Status: SHIPPED | OUTPATIENT
Start: 2019-02-21 | End: 2019-03-03 | Stop reason: SDUPTHER

## 2019-02-21 RX ORDER — PROMETHAZINE HYDROCHLORIDE 25 MG/1
25 TABLET ORAL ONCE AS NEEDED
Status: DISCONTINUED | OUTPATIENT
Start: 2019-02-21 | End: 2019-02-21 | Stop reason: HOSPADM

## 2019-02-21 RX ORDER — SODIUM CHLORIDE, SODIUM LACTATE, POTASSIUM CHLORIDE, CALCIUM CHLORIDE 600; 310; 30; 20 MG/100ML; MG/100ML; MG/100ML; MG/100ML
30 INJECTION, SOLUTION INTRAVENOUS CONTINUOUS
Status: DISCONTINUED | OUTPATIENT
Start: 2019-02-21 | End: 2019-02-21 | Stop reason: HOSPADM

## 2019-02-21 RX ORDER — PROPOFOL 10 MG/ML
VIAL (ML) INTRAVENOUS AS NEEDED
Status: DISCONTINUED | OUTPATIENT
Start: 2019-02-21 | End: 2019-02-21 | Stop reason: SURG

## 2019-02-21 RX ORDER — PROMETHAZINE HYDROCHLORIDE 25 MG/ML
12.5 INJECTION, SOLUTION INTRAMUSCULAR; INTRAVENOUS ONCE AS NEEDED
Status: DISCONTINUED | OUTPATIENT
Start: 2019-02-21 | End: 2019-02-21 | Stop reason: HOSPADM

## 2019-02-21 RX ORDER — LIDOCAINE HYDROCHLORIDE 20 MG/ML
INJECTION, SOLUTION INFILTRATION; PERINEURAL AS NEEDED
Status: DISCONTINUED | OUTPATIENT
Start: 2019-02-21 | End: 2019-02-21 | Stop reason: SURG

## 2019-02-21 RX ORDER — PROPOFOL 10 MG/ML
VIAL (ML) INTRAVENOUS CONTINUOUS PRN
Status: DISCONTINUED | OUTPATIENT
Start: 2019-02-21 | End: 2019-02-21 | Stop reason: SURG

## 2019-02-21 RX ORDER — PANTOPRAZOLE SODIUM 40 MG/1
40 TABLET, DELAYED RELEASE ORAL DAILY
Qty: 30 TABLET | Refills: 11 | Status: SHIPPED | OUTPATIENT
Start: 2019-02-21 | End: 2019-10-01 | Stop reason: HOSPADM

## 2019-02-21 RX ADMIN — LIDOCAINE HYDROCHLORIDE 120 MG: 20 INJECTION, SOLUTION INFILTRATION; PERINEURAL at 11:52

## 2019-02-21 RX ADMIN — PROPOFOL 200 MCG/KG/MIN: 10 INJECTION, EMULSION INTRAVENOUS at 11:52

## 2019-02-21 RX ADMIN — PROPOFOL 60 MG: 10 INJECTION, EMULSION INTRAVENOUS at 11:52

## 2019-02-21 RX ADMIN — SODIUM CHLORIDE, POTASSIUM CHLORIDE, SODIUM LACTATE AND CALCIUM CHLORIDE: 600; 310; 30; 20 INJECTION, SOLUTION INTRAVENOUS at 11:47

## 2019-02-21 NOTE — ANESTHESIA PREPROCEDURE EVALUATION
Anesthesia Evaluation     Patient summary reviewed and Nursing notes reviewed   NPO Solid Status: > 8 hours  NPO Liquid Status: > 4 hours           Airway   Mallampati: I  TM distance: >3 FB  Neck ROM: full  No difficulty expected  Dental - normal exam     Pulmonary - normal exam   (+) a smoker Former, asthma,   Cardiovascular - normal exam    (+) hyperlipidemia,       Neuro/Psych  GI/Hepatic/Renal/Endo    (+)   diabetes mellitus type 2 well controlled, hypothyroidism,     Musculoskeletal     Abdominal    Substance History      OB/GYN          Other                        Anesthesia Plan    ASA 2     MAC     Anesthetic plan, all risks, benefits, and alternatives have been provided, discussed and informed consent has been obtained with: patient.

## 2019-02-21 NOTE — ANESTHESIA POSTPROCEDURE EVALUATION
Patient: Eunice Ennis    Procedure Summary     Date:  02/21/19 Room / Location:  [facility] ENDOSCOPY 10 /  [facility]    Anesthesia Start:  1147 Anesthesia Stop:  1220    Procedures:       ESOPHAGOGASTRODUODENOSCOPY WITH BIOPSY (N/A Esophagus)      COLONOSCOPY TO CECUM (N/A ) Diagnosis:       Constipation, unspecified constipation type      Dyspepsia      (Constipation, unspecified constipation type [K59.00])      (Dyspepsia [R10.13])    Surgeon:  Rony Trimble MD Provider:  Hanna Swift MD    Anesthesia Type:  MAC ASA Status:  2          Anesthesia Type: MAC  Last vitals  BP   128/77 (02/21/19 1240)   Temp   36.4 °C (97.5 °F) (02/21/19 0957)   Pulse   75 (02/21/19 1240)   Resp   16 (02/21/19 1240)     SpO2   (!) 75 % (02/21/19 1240)     Post Anesthesia Care and Evaluation    Patient location during evaluation: bedside  Patient participation: complete - patient participated  Level of consciousness: awake and alert  Pain management: adequate  Airway patency: patent  Anesthetic complications: No anesthetic complications    Cardiovascular status: acceptable  Respiratory status: acceptable  Hydration status: acceptable    Comments: /77 (BP Location: Left arm, Patient Position: Sitting)   Pulse 75   Temp 36.4 °C (97.5 °F) (Oral)   Resp 16   SpO2 (!) 75%

## 2019-02-21 NOTE — NURSING NOTE
1st PIV attempt in right hand per ANGELA Ma RN, unsuccessful.     Multiple other attempts per Gena ARMAS RN. Pt tolerated. Ultrasound machine and lidocaine at bedside.

## 2019-02-22 NOTE — PROGRESS NOTES
+ve H pylori  Prevpac please  REpeat EGD in 3 mos to ensure h pylori eradication and improvement in esophagitis

## 2019-02-25 LAB
CYTO UR: NORMAL
LAB AP CASE REPORT: NORMAL
LAB AP DIAGNOSIS COMMENT: NORMAL
PATH REPORT.ADDENDUM SPEC: NORMAL
PATH REPORT.FINAL DX SPEC: NORMAL
PATH REPORT.GROSS SPEC: NORMAL

## 2019-03-04 ENCOUNTER — TELEPHONE (OUTPATIENT)
Dept: GASTROENTEROLOGY | Facility: CLINIC | Age: 59
End: 2019-03-04

## 2019-03-04 DIAGNOSIS — K20.90 ESOPHAGITIS: Primary | ICD-10-CM

## 2019-03-04 DIAGNOSIS — A04.8 BACTERIAL INFECTION DUE TO HELICOBACTER PYLORI: ICD-10-CM

## 2019-03-04 RX ORDER — SUCRALFATE 1 G/10ML
SUSPENSION ORAL
Qty: 420 ML | Refills: 0 | Status: SHIPPED | OUTPATIENT
Start: 2019-03-04 | End: 2019-03-17 | Stop reason: SDUPTHER

## 2019-03-04 NOTE — TELEPHONE ENCOUNTER
----- Message from Erika Figueredo sent at 3/4/2019  1:04 PM EST -----  Regarding: egd   Contact: [phone]  egd path report

## 2019-03-04 NOTE — TELEPHONE ENCOUNTER
Notes recorded by Rony Trimble MD on 2/22/2019 at 11:48 AM EST  +ve H pylori  Prevpac please  REpeat EGD in 3 mos to ensure h pylori eradication and improvement in esophagitis

## 2019-03-04 NOTE — TELEPHONE ENCOUNTER
Returned patient's phone call. Advised patient as per Dr. Trimble, her Path results revealed she was positive for H.pylori and will need to start a Prevpac. Advised patient how to take her medication and that Clarithromycin can cause a metallic taste in the mouth. Advised once she completes the medication the taste should go away. Advised she will need to repeat EGD in 3 months. She states when she had her last EGD, it took nine sticks before she they could get an IV line in her. Patient requested a PICC line with her next EGD. Advised will send Dr. Trimble an update regarding how difficult it is to start an IV line on her. Medications e-scribed.

## 2019-03-05 RX ORDER — AMOXICILLIN 500 MG/1
1000 CAPSULE ORAL 2 TIMES DAILY
Qty: 40 CAPSULE | Refills: 0 | Status: SHIPPED | OUTPATIENT
Start: 2019-03-05 | End: 2019-08-23

## 2019-03-05 RX ORDER — LANSOPRAZOLE 30 MG/1
30 CAPSULE, DELAYED RELEASE ORAL 2 TIMES DAILY
Qty: 20 CAPSULE | Refills: 0 | Status: SHIPPED | OUTPATIENT
Start: 2019-03-05 | End: 2019-10-01 | Stop reason: HOSPADM

## 2019-03-05 RX ORDER — CLARITHROMYCIN 500 MG/1
500 TABLET, COATED ORAL 2 TIMES DAILY
Qty: 20 TABLET | Refills: 0 | Status: SHIPPED | OUTPATIENT
Start: 2019-03-05 | End: 2019-08-23

## 2019-03-05 NOTE — TELEPHONE ENCOUNTER
Patient called, message left advising patient to hold her Atorvastatin for the 10 days she is on the Prevpac. Also advised if she is still using the Symbicort Inhaler she is to stop it for 10 days also. Advised to call back for questions.

## 2019-03-18 RX ORDER — SUCRALFATE 1 G/10ML
SUSPENSION ORAL
Qty: 420 ML | Refills: 0 | Status: SHIPPED | OUTPATIENT
Start: 2019-03-18 | End: 2019-04-09 | Stop reason: SDUPTHER

## 2019-03-19 PROBLEM — K20.90 ESOPHAGITIS: Status: ACTIVE | Noted: 2019-03-19

## 2019-03-19 PROBLEM — A04.8 BACTERIAL INFECTION DUE TO HELICOBACTER PYLORI: Status: ACTIVE | Noted: 2019-03-19

## 2019-03-20 ENCOUNTER — TELEPHONE (OUTPATIENT)
Dept: GASTROENTEROLOGY | Facility: CLINIC | Age: 59
End: 2019-03-20

## 2019-03-20 RX ORDER — FLUCONAZOLE 100 MG/1
100 TABLET ORAL DAILY
Qty: 5 TABLET | Refills: 0 | Status: SHIPPED | OUTPATIENT
Start: 2019-03-20 | End: 2019-08-23

## 2019-03-20 NOTE — TELEPHONE ENCOUNTER
Contact: [phone]  From: Heidi Wang  Sent: 3/20/2019   8:10 AM  To: [facility]  Subject: medication                                       Pt is calling stating  put her on a antibotic & it has gave her a yeast infection. Pt is asking can something get called in for her. Pt is at work & cant answer her phone. Can leave a voicemail.      Pharm#[phone]

## 2019-03-20 NOTE — TELEPHONE ENCOUNTER
Diflucan e-scribed to her [facility] pharmacy. VM left advising the medication has been e-scribed.

## 2019-03-21 RX ORDER — LEVOTHYROXINE SODIUM 0.15 MG/1
TABLET ORAL
Qty: 30 TABLET | Refills: 0 | Status: SHIPPED | OUTPATIENT
Start: 2019-03-21 | End: 2019-04-25 | Stop reason: SDUPTHER

## 2019-03-28 ENCOUNTER — TELEPHONE (OUTPATIENT)
Dept: GASTROENTEROLOGY | Facility: CLINIC | Age: 59
End: 2019-03-28

## 2019-03-28 DIAGNOSIS — K29.70 HELICOBACTER PYLORI GASTRITIS: Primary | ICD-10-CM

## 2019-03-28 DIAGNOSIS — B96.81 HELICOBACTER PYLORI GASTRITIS: Primary | ICD-10-CM

## 2019-04-09 RX ORDER — SUCRALFATE ORAL 1 G/10ML
1 SUSPENSION ORAL
Qty: 600 ML | Refills: 2 | Status: SHIPPED | OUTPATIENT
Start: 2019-04-09 | End: 2019-10-10

## 2019-04-14 ENCOUNTER — RESULTS ENCOUNTER (OUTPATIENT)
Dept: GASTROENTEROLOGY | Facility: CLINIC | Age: 59
End: 2019-04-14

## 2019-04-14 DIAGNOSIS — B96.81 HELICOBACTER PYLORI GASTRITIS: ICD-10-CM

## 2019-04-14 DIAGNOSIS — K29.70 HELICOBACTER PYLORI GASTRITIS: ICD-10-CM

## 2019-04-22 LAB — UREA BREATH TEST QL: NEGATIVE

## 2019-04-24 ENCOUNTER — TELEPHONE (OUTPATIENT)
Dept: GASTROENTEROLOGY | Facility: CLINIC | Age: 59
End: 2019-04-24

## 2019-04-24 NOTE — TELEPHONE ENCOUNTER
Patient called, no answer, VM identified patient, left message advising her HPBT was negative. Advised to call back for questions.

## 2019-04-24 NOTE — TELEPHONE ENCOUNTER
----- Message from JOHNNY Michelle sent at 4/22/2019 12:35 PM EDT -----  Please notify patient breath test is negative for H. pylori.

## 2019-04-25 RX ORDER — LEVOTHYROXINE SODIUM 0.15 MG/1
TABLET ORAL
Qty: 30 TABLET | Refills: 0 | Status: SHIPPED | OUTPATIENT
Start: 2019-04-25 | End: 2019-05-30 | Stop reason: SDUPTHER

## 2019-05-30 RX ORDER — LEVOTHYROXINE SODIUM 0.15 MG/1
TABLET ORAL
Qty: 30 TABLET | Refills: 0 | Status: SHIPPED | OUTPATIENT
Start: 2019-05-30 | End: 2019-07-07 | Stop reason: SDUPTHER

## 2019-06-04 RX ORDER — ATORVASTATIN CALCIUM 20 MG/1
TABLET, FILM COATED ORAL
Qty: 90 TABLET | Refills: 1 | Status: SHIPPED | OUTPATIENT
Start: 2019-06-04 | End: 2019-08-23 | Stop reason: SDUPTHER

## 2019-07-08 RX ORDER — LEVOTHYROXINE SODIUM 0.15 MG/1
TABLET ORAL
Qty: 30 TABLET | Refills: 0 | Status: SHIPPED | OUTPATIENT
Start: 2019-07-08 | End: 2019-08-07 | Stop reason: SDUPTHER

## 2019-08-07 RX ORDER — LEVOTHYROXINE SODIUM 0.15 MG/1
TABLET ORAL
Qty: 30 TABLET | Refills: 0 | Status: SHIPPED | OUTPATIENT
Start: 2019-08-07 | End: 2019-09-12 | Stop reason: SDUPTHER

## 2019-08-21 ENCOUNTER — TELEPHONE (OUTPATIENT)
Dept: FAMILY MEDICINE CLINIC | Facility: CLINIC | Age: 59
End: 2019-08-21

## 2019-08-22 ENCOUNTER — TELEPHONE (OUTPATIENT)
Dept: FAMILY MEDICINE CLINIC | Facility: CLINIC | Age: 59
End: 2019-08-22

## 2019-08-22 NOTE — TELEPHONE ENCOUNTER
PT HAS APPT TOMORROW MORNING TO SEE DR. GARCIAS, JUST DISCHARGED FROM THE HOSPITAL TODAY BUT CANNOT TAKE THE MEDICATION THEY GAVE HER.   PT WANTS TO KNOW IF DR THINKS SHE SHOULD WAIT TO TAKE THE ABX THEY GAVE HER UNTIL SHE IS SEEN OR GO AHEAD AND TRY TO TAKE IT TODAY?

## 2019-08-22 NOTE — TELEPHONE ENCOUNTER
She was given bactrim at the [facility], it made her sick to stomach and vomiting. She went to [facility] and they said she was severely dehydrated. She was d/c at 9 pm. She went back on Tuesday morning at 2 am. She has mrsa. They gave her clindamyocin and it worked fine. Now the spot on side is sore.should she use neosporin on it. Should she cover it up

## 2019-08-22 NOTE — TELEPHONE ENCOUNTER
I do not find a discharge summary see what hospital note we reviewed those notes also what medication are we talking about and she actually did not take that if she cannot reach them understand the question properly is she asking to take it anyway I need better definition to all this to respond.

## 2019-08-22 NOTE — TELEPHONE ENCOUNTER
Would have her cover her sore area and use Neosporin.  Definitely encourage her to take clindamycin 300 3 times daily we will discuss this further on visit tomorrow.

## 2019-08-23 ENCOUNTER — OFFICE VISIT (OUTPATIENT)
Dept: FAMILY MEDICINE CLINIC | Facility: CLINIC | Age: 59
End: 2019-08-23

## 2019-08-23 ENCOUNTER — TRANSCRIBE ORDERS (OUTPATIENT)
Dept: ADMINISTRATIVE | Facility: HOSPITAL | Age: 59
End: 2019-08-23

## 2019-08-23 VITALS
BODY MASS INDEX: 33.43 KG/M2 | OXYGEN SATURATION: 100 % | HEART RATE: 77 BPM | DIASTOLIC BLOOD PRESSURE: 70 MMHG | HEIGHT: 68 IN | TEMPERATURE: 98.1 F | WEIGHT: 220.6 LBS | SYSTOLIC BLOOD PRESSURE: 110 MMHG

## 2019-08-23 DIAGNOSIS — Z12.31 SCREENING MAMMOGRAM, ENCOUNTER FOR: Primary | ICD-10-CM

## 2019-08-23 DIAGNOSIS — L02.211 ABDOMINAL WALL ABSCESS: Primary | ICD-10-CM

## 2019-08-23 DIAGNOSIS — Z86.39 H/O DEHYDRATION: ICD-10-CM

## 2019-08-23 DIAGNOSIS — N30.00 ACUTE CYSTITIS WITHOUT HEMATURIA: ICD-10-CM

## 2019-08-23 LAB
BACTERIA UR QL AUTO: ABNORMAL /HPF
BILIRUB UR QL STRIP: NEGATIVE
CLARITY UR: CLEAR
COLOR UR: YELLOW
GLUCOSE UR STRIP-MCNC: ABNORMAL MG/DL
HGB UR QL STRIP.AUTO: NEGATIVE
KETONES UR QL STRIP: NEGATIVE
LEUKOCYTE ESTERASE UR QL STRIP.AUTO: NEGATIVE
NITRITE UR QL STRIP: NEGATIVE
PH UR STRIP.AUTO: 5.5 [PH] (ref 4.6–8)
PROT UR QL STRIP: ABNORMAL
RBC # UR: ABNORMAL /HPF
REF LAB TEST METHOD: ABNORMAL
SP GR UR STRIP: >=1.03 (ref 1–1.03)
SQUAMOUS #/AREA URNS HPF: ABNORMAL /HPF
UROBILINOGEN UR QL STRIP: ABNORMAL
WBC UR QL AUTO: ABNORMAL /HPF

## 2019-08-23 PROCEDURE — 81001 URINALYSIS AUTO W/SCOPE: CPT | Performed by: INTERNAL MEDICINE

## 2019-08-23 PROCEDURE — 99214 OFFICE O/P EST MOD 30 MIN: CPT | Performed by: INTERNAL MEDICINE

## 2019-08-23 RX ORDER — CLINDAMYCIN HYDROCHLORIDE 300 MG/1
300 CAPSULE ORAL 3 TIMES DAILY
Qty: 30 CAPSULE | Refills: 0 | Status: SHIPPED | OUTPATIENT
Start: 2019-08-23 | End: 2019-09-02

## 2019-08-23 NOTE — PROGRESS NOTES
Subjective   Eunice Ennis is a 58 y.o. female.   Recent hospitalization at [facility] with vomiting dehydration by history slight elevated potassium and abdominal abscess also had a UTI  History of Present Illness   Recent hospitalization with dehydration requiring IV fluids and abdominal wall abscess as well as bladder infection was given unknown IV antibiotics discharged home on Bactrim which he made patient sick before.  Question about whether she got clindamycin in the hospital or not strong that would cover the MRSA with possible infection of the abdominal wall.  We will have patient see surgeon as well today's abscess does seem to be draining optically ominous but measures 3 x 3.5 cm in size right upper abdominal wall.  No temperature with this.  We will repeat urine although no specific bladder infection symptoms patient is no longer having nausea vomiting we will avoid Bactrim to due to recent documentation of GI upset with this medication.  Overall feeling better sugars been highly variable but is followed by Dr. terry for this  Patient did ask about the new shingles vaccine Shingrix encouraged her to get that she is getting it that as well as hepatitis B vaccination we will have her wait on Shingrix disease but for the long from covering from her abscess.  She plans on getting that her at her [facility] pharmacy.  Family history positive for diabetes and hypertension.  Patient is a former smoker.  Drug allergies include Bactrim causing GI upset no true allergic  Review of Systems   All other systems reviewed and are negative.      Objective   Vitals:    08/23/19 0801   BP: 110/70   Pulse: 77   Temp: 98.1 °F (36.7 °C)   SpO2: 100%   Weight: 100 kg (220 lb 9.6 oz)     Physical Exam   Constitutional: She appears well-developed and well-nourished.   HENT:   Head: Normocephalic and atraumatic.   Eyes: Conjunctivae are normal. Pupils are equal, round, and reactive to light.   Cardiovascular: Normal rate,  regular rhythm and normal heart sounds.   Pulmonary/Chest: Effort normal and breath sounds normal.   Abdominal: Soft. Bowel sounds are normal.    High in right upper quadrant generally in line with anterior axillary line there is a 3 x 3.5 cm superficial abscess.  Does seem to be draining.  We will continue clindamycin and have general surgeon see that it is not clear if she will need any true surgical tension versus continued antibiotics.  It is draining freely now did not think she needs to be seen today by general surgery no fever reported as well.   Neurological: She is alert.   Unremarkable gait and station   Skin: Skin is warm and dry.   Nursing note and vitals reviewed.      No results found for: INR    Procedures    Assessment/Plan   .  Abdominal wall abscess plan continue clindamycin for general surgery Dr. Angulo group please see Monday or Tuesday of this week.  See if further surgical attention needed    2.  Dehydration status post hospitalization clinically improved urinating freely patient did have a slight elevated potassium prior history BMP obtained from hospital on 822 showed resolution of this.    3.  UTI no symptoms at present get UA to see if we have anything further and likewise his clindamycin is mediocre if further treatment is needed will clearly avoid the Bactrim which caused significant GI upset.    Much of this encounter note is an electronic transcription/translation of spoken language to printed text.  The electronic translation of spoken language may permit erroneous, or at times, nonsensical words or phrases to be inadvertently transcribed.  Although I have reviewed the note for such errors, some may still exist. If there are questions or for further clarification, please contact me.

## 2019-09-09 RX ORDER — ESCITALOPRAM OXALATE 20 MG/1
TABLET ORAL
Qty: 30 TABLET | Refills: 3 | Status: SHIPPED | OUTPATIENT
Start: 2019-09-09 | End: 2020-07-13

## 2019-09-12 RX ORDER — LEVOTHYROXINE SODIUM 0.15 MG/1
TABLET ORAL
Qty: 30 TABLET | Refills: 0 | Status: SHIPPED | OUTPATIENT
Start: 2019-09-12 | End: 2019-10-01 | Stop reason: HOSPADM

## 2019-09-24 ENCOUNTER — APPOINTMENT (OUTPATIENT)
Dept: CT IMAGING | Facility: HOSPITAL | Age: 59
End: 2019-09-24

## 2019-09-24 ENCOUNTER — HOSPITAL ENCOUNTER (EMERGENCY)
Facility: HOSPITAL | Age: 59
Discharge: HOME | End: 2019-09-24
Attending: EMERGENCY MEDICINE | Admitting: EMERGENCY MEDICINE

## 2019-09-24 ENCOUNTER — OFFICE VISIT (OUTPATIENT)
Dept: GASTROENTEROLOGY | Facility: CLINIC | Age: 59
End: 2019-09-24

## 2019-09-24 VITALS
HEIGHT: 68 IN | DIASTOLIC BLOOD PRESSURE: 75 MMHG | WEIGHT: 224.3 LBS | BODY MASS INDEX: 33.99 KG/M2 | RESPIRATION RATE: 16 BRPM | HEART RATE: 94 BPM | SYSTOLIC BLOOD PRESSURE: 150 MMHG | TEMPERATURE: 97.3 F | OXYGEN SATURATION: 100 %

## 2019-09-24 VITALS — HEIGHT: 68 IN | BODY MASS INDEX: 31.64 KG/M2 | WEIGHT: 208.8 LBS

## 2019-09-24 VITALS
DIASTOLIC BLOOD PRESSURE: 88 MMHG | HEART RATE: 104 BPM | OXYGEN SATURATION: 97 % | TEMPERATURE: 96.9 F | WEIGHT: 208 LBS | BODY MASS INDEX: 31.52 KG/M2 | RESPIRATION RATE: 16 BRPM | SYSTOLIC BLOOD PRESSURE: 188 MMHG | HEIGHT: 68 IN

## 2019-09-24 DIAGNOSIS — K20.90 ESOPHAGITIS: Primary | ICD-10-CM

## 2019-09-24 DIAGNOSIS — Z86.19 HISTORY OF HELICOBACTER PYLORI INFECTION: ICD-10-CM

## 2019-09-24 DIAGNOSIS — R11.2 NAUSEA AND VOMITING, INTRACTABILITY OF VOMITING NOT SPECIFIED, UNSPECIFIED VOMITING TYPE: ICD-10-CM

## 2019-09-24 DIAGNOSIS — R10.84 GENERALIZED ABDOMINAL PAIN: Primary | ICD-10-CM

## 2019-09-24 DIAGNOSIS — R11.2 NON-INTRACTABLE VOMITING WITH NAUSEA, UNSPECIFIED VOMITING TYPE: Primary | ICD-10-CM

## 2019-09-24 LAB
ALBUMIN SERPL-MCNC: 4.6 G/DL (ref 3.5–5.2)
ALBUMIN SERPL-MCNC: 4.9 G/DL (ref 3.5–5.2)
ALBUMIN/GLOB SERPL: 1.2 G/DL
ALBUMIN/GLOB SERPL: 1.4 G/DL
ALP SERPL-CCNC: 71 U/L (ref 39–117)
ALP SERPL-CCNC: 85 U/L (ref 39–117)
ALT SERPL W P-5'-P-CCNC: 13 U/L (ref 1–33)
ALT SERPL W P-5'-P-CCNC: 14 U/L (ref 1–33)
ANION GAP SERPL CALCULATED.3IONS-SCNC: 16.8 MMOL/L (ref 5–15)
ANION GAP SERPL CALCULATED.3IONS-SCNC: 17.6 MMOL/L (ref 5–15)
AST SERPL-CCNC: 14 U/L (ref 1–32)
AST SERPL-CCNC: 18 U/L (ref 1–32)
BASOPHILS # BLD AUTO: 0.02 10*3/MM3 (ref 0–0.2)
BASOPHILS # BLD AUTO: 0.03 10*3/MM3 (ref 0–0.2)
BASOPHILS NFR BLD AUTO: 0.2 % (ref 0–1.5)
BASOPHILS NFR BLD AUTO: 0.2 % (ref 0–1.5)
BILIRUB SERPL-MCNC: 0.6 MG/DL (ref 0.2–1.2)
BILIRUB SERPL-MCNC: 0.6 MG/DL (ref 0.2–1.2)
BUN BLD-MCNC: 21 MG/DL (ref 6–20)
BUN BLD-MCNC: 23 MG/DL (ref 6–20)
BUN/CREAT SERPL: 30 (ref 7–25)
BUN/CREAT SERPL: 32.4 (ref 7–25)
CALCIUM SPEC-SCNC: 9.5 MG/DL (ref 8.6–10.5)
CALCIUM SPEC-SCNC: 9.6 MG/DL (ref 8.6–10.5)
CHLORIDE SERPL-SCNC: 94 MMOL/L (ref 98–107)
CHLORIDE SERPL-SCNC: 97 MMOL/L (ref 98–107)
CO2 SERPL-SCNC: 24.2 MMOL/L (ref 22–29)
CO2 SERPL-SCNC: 25.4 MMOL/L (ref 22–29)
CREAT BLD-MCNC: 0.7 MG/DL (ref 0.57–1)
CREAT BLD-MCNC: 0.71 MG/DL (ref 0.57–1)
DEPRECATED RDW RBC AUTO: 40 FL (ref 37–54)
DEPRECATED RDW RBC AUTO: 42.1 FL (ref 37–54)
EOSINOPHIL # BLD AUTO: 0 10*3/MM3 (ref 0–0.4)
EOSINOPHIL # BLD AUTO: 0 10*3/MM3 (ref 0–0.4)
EOSINOPHIL NFR BLD AUTO: 0 % (ref 0.3–6.2)
EOSINOPHIL NFR BLD AUTO: 0 % (ref 0.3–6.2)
ERYTHROCYTE [DISTWIDTH] IN BLOOD BY AUTOMATED COUNT: 12.4 % (ref 12.3–15.4)
ERYTHROCYTE [DISTWIDTH] IN BLOOD BY AUTOMATED COUNT: 12.8 % (ref 12.3–15.4)
GFR SERPL CREATININE-BSD FRML MDRD: 85 ML/MIN/1.73
GFR SERPL CREATININE-BSD FRML MDRD: 87 ML/MIN/1.73
GLOBULIN UR ELPH-MCNC: 3.5 GM/DL
GLOBULIN UR ELPH-MCNC: 3.7 GM/DL
GLUCOSE BLD-MCNC: 164 MG/DL (ref 65–99)
GLUCOSE BLD-MCNC: 199 MG/DL (ref 65–99)
GLUCOSE BLDC GLUCOMTR-MCNC: 151 MG/DL (ref 70–130)
GLUCOSE BLDC GLUCOMTR-MCNC: 161 MG/DL (ref 70–130)
HCT VFR BLD AUTO: 45.3 % (ref 34–46.6)
HCT VFR BLD AUTO: 46 % (ref 34–46.6)
HGB BLD-MCNC: 15.1 G/DL (ref 12–15.9)
HGB BLD-MCNC: 15.5 G/DL (ref 12–15.9)
HOLD SPECIMEN: NORMAL
IMM GRANULOCYTES # BLD AUTO: 0.04 10*3/MM3 (ref 0–0.05)
IMM GRANULOCYTES # BLD AUTO: 0.06 10*3/MM3 (ref 0–0.05)
IMM GRANULOCYTES NFR BLD AUTO: 0.4 % (ref 0–0.5)
IMM GRANULOCYTES NFR BLD AUTO: 0.5 % (ref 0–0.5)
LIPASE SERPL-CCNC: 19 U/L (ref 13–60)
LIPASE SERPL-CCNC: 20 U/L (ref 13–60)
LYMPHOCYTES # BLD AUTO: 1.16 10*3/MM3 (ref 0.7–3.1)
LYMPHOCYTES # BLD AUTO: 1.4 10*3/MM3 (ref 0.7–3.1)
LYMPHOCYTES NFR BLD AUTO: 10.8 % (ref 19.6–45.3)
LYMPHOCYTES NFR BLD AUTO: 12.1 % (ref 19.6–45.3)
MCH RBC QN AUTO: 29.8 PG (ref 26.6–33)
MCH RBC QN AUTO: 30.2 PG (ref 26.6–33)
MCHC RBC AUTO-ENTMCNC: 32.8 G/DL (ref 31.5–35.7)
MCHC RBC AUTO-ENTMCNC: 34.2 G/DL (ref 31.5–35.7)
MCV RBC AUTO: 88.1 FL (ref 79–97)
MCV RBC AUTO: 90.7 FL (ref 79–97)
MONOCYTES # BLD AUTO: 0.21 10*3/MM3 (ref 0.1–0.9)
MONOCYTES # BLD AUTO: 0.76 10*3/MM3 (ref 0.1–0.9)
MONOCYTES NFR BLD AUTO: 2.2 % (ref 5–12)
MONOCYTES NFR BLD AUTO: 5.9 % (ref 5–12)
NEUTROPHILS # BLD AUTO: 10.68 10*3/MM3 (ref 1.7–7)
NEUTROPHILS # BLD AUTO: 8.19 10*3/MM3 (ref 1.7–7)
NEUTROPHILS NFR BLD AUTO: 82.6 % (ref 42.7–76)
NEUTROPHILS NFR BLD AUTO: 85.1 % (ref 42.7–76)
NRBC BLD AUTO-RTO: 0 /100 WBC (ref 0–0.2)
NRBC BLD AUTO-RTO: 0 /100 WBC (ref 0–0.2)
PLATELET # BLD AUTO: 243 10*3/MM3 (ref 140–450)
PLATELET # BLD AUTO: 258 10*3/MM3 (ref 140–450)
PMV BLD AUTO: 9.2 FL (ref 6–12)
PMV BLD AUTO: 9.4 FL (ref 6–12)
POTASSIUM BLD-SCNC: 4.3 MMOL/L (ref 3.5–5.2)
POTASSIUM BLD-SCNC: 4.7 MMOL/L (ref 3.5–5.2)
PROT SERPL-MCNC: 8.3 G/DL (ref 6–8.5)
PROT SERPL-MCNC: 8.4 G/DL (ref 6–8.5)
RBC # BLD AUTO: 5.07 10*6/MM3 (ref 3.77–5.28)
RBC # BLD AUTO: 5.14 10*6/MM3 (ref 3.77–5.28)
SODIUM BLD-SCNC: 137 MMOL/L (ref 136–145)
SODIUM BLD-SCNC: 138 MMOL/L (ref 136–145)
WBC NRBC COR # BLD: 12.93 10*3/MM3 (ref 3.4–10.8)
WBC NRBC COR # BLD: 9.62 10*3/MM3 (ref 3.4–10.8)
WHOLE BLOOD HOLD SPECIMEN: NORMAL

## 2019-09-24 PROCEDURE — 96376 TX/PRO/DX INJ SAME DRUG ADON: CPT

## 2019-09-24 PROCEDURE — 83690 ASSAY OF LIPASE: CPT | Performed by: EMERGENCY MEDICINE

## 2019-09-24 PROCEDURE — 85025 COMPLETE CBC W/AUTO DIFF WBC: CPT | Performed by: EMERGENCY MEDICINE

## 2019-09-24 PROCEDURE — 82962 GLUCOSE BLOOD TEST: CPT

## 2019-09-24 PROCEDURE — 96375 TX/PRO/DX INJ NEW DRUG ADDON: CPT

## 2019-09-24 PROCEDURE — 80053 COMPREHEN METABOLIC PANEL: CPT | Performed by: EMERGENCY MEDICINE

## 2019-09-24 PROCEDURE — 96374 THER/PROPH/DIAG INJ IV PUSH: CPT

## 2019-09-24 PROCEDURE — 25010000002 IOPAMIDOL 61 % SOLUTION: Performed by: EMERGENCY MEDICINE

## 2019-09-24 PROCEDURE — 99214 OFFICE O/P EST MOD 30 MIN: CPT | Performed by: NURSE PRACTITIONER

## 2019-09-24 PROCEDURE — 25010000002 MORPHINE PER 10 MG: Performed by: EMERGENCY MEDICINE

## 2019-09-24 PROCEDURE — 74177 CT ABD & PELVIS W/CONTRAST: CPT

## 2019-09-24 PROCEDURE — 99283 EMERGENCY DEPT VISIT LOW MDM: CPT

## 2019-09-24 PROCEDURE — 99284 EMERGENCY DEPT VISIT MOD MDM: CPT

## 2019-09-24 PROCEDURE — 25010000002 ONDANSETRON PER 1 MG: Performed by: EMERGENCY MEDICINE

## 2019-09-24 RX ORDER — SODIUM CHLORIDE 0.9 % (FLUSH) 0.9 %
10 SYRINGE (ML) INJECTION AS NEEDED
Status: DISCONTINUED | OUTPATIENT
Start: 2019-09-24 | End: 2019-09-24 | Stop reason: HOSPADM

## 2019-09-24 RX ORDER — MORPHINE SULFATE 2 MG/ML
4 INJECTION, SOLUTION INTRAMUSCULAR; INTRAVENOUS ONCE
Status: COMPLETED | OUTPATIENT
Start: 2019-09-24 | End: 2019-09-24

## 2019-09-24 RX ORDER — ONDANSETRON 2 MG/ML
4 INJECTION INTRAMUSCULAR; INTRAVENOUS ONCE
Status: COMPLETED | OUTPATIENT
Start: 2019-09-24 | End: 2019-09-24

## 2019-09-24 RX ORDER — ONDANSETRON 4 MG/1
4 TABLET, ORALLY DISINTEGRATING ORAL EVERY 8 HOURS PRN
Qty: 10 TABLET | Refills: 0 | Status: ON HOLD | OUTPATIENT
Start: 2019-09-24 | End: 2019-10-01

## 2019-09-24 RX ORDER — ONDANSETRON 4 MG/1
4 TABLET, ORALLY DISINTEGRATING ORAL ONCE
Status: COMPLETED | OUTPATIENT
Start: 2019-09-24 | End: 2019-09-24

## 2019-09-24 RX ORDER — SODIUM CHLORIDE 9 MG/ML
125 INJECTION, SOLUTION INTRAVENOUS CONTINUOUS
Status: DISCONTINUED | OUTPATIENT
Start: 2019-09-24 | End: 2019-09-24

## 2019-09-24 RX ORDER — PROMETHAZINE HYDROCHLORIDE 25 MG/ML
12.5 INJECTION, SOLUTION INTRAMUSCULAR; INTRAVENOUS ONCE
Status: DISCONTINUED | OUTPATIENT
Start: 2019-09-24 | End: 2019-09-24

## 2019-09-24 RX ORDER — PROMETHAZINE HYDROCHLORIDE 25 MG/1
25 TABLET ORAL EVERY 6 HOURS PRN
Qty: 20 TABLET | Refills: 0 | Status: SHIPPED | OUTPATIENT
Start: 2019-09-24 | End: 2022-07-12

## 2019-09-24 RX ORDER — TRAMADOL HYDROCHLORIDE 50 MG/1
50 TABLET ORAL EVERY 4 HOURS PRN
Qty: 15 TABLET | Refills: 0 | Status: SHIPPED | OUTPATIENT
Start: 2019-09-24 | End: 2022-01-10

## 2019-09-24 RX ADMIN — MORPHINE SULFATE 4 MG: 2 INJECTION, SOLUTION INTRAMUSCULAR; INTRAVENOUS at 18:02

## 2019-09-24 RX ADMIN — IOPAMIDOL 85 ML: 612 INJECTION, SOLUTION INTRAVENOUS at 18:34

## 2019-09-24 RX ADMIN — SODIUM CHLORIDE 1000 ML: 9 INJECTION, SOLUTION INTRAVENOUS at 18:03

## 2019-09-24 RX ADMIN — MORPHINE SULFATE 4 MG: 2 INJECTION, SOLUTION INTRAMUSCULAR; INTRAVENOUS at 08:22

## 2019-09-24 RX ADMIN — ONDANSETRON 4 MG: 2 INJECTION INTRAMUSCULAR; INTRAVENOUS at 08:22

## 2019-09-24 RX ADMIN — ONDANSETRON 4 MG: 4 TABLET, ORALLY DISINTEGRATING ORAL at 19:45

## 2019-09-24 RX ADMIN — ONDANSETRON 4 MG: 4 TABLET, ORALLY DISINTEGRATING ORAL at 01:20

## 2019-09-24 RX ADMIN — SODIUM CHLORIDE 1000 ML: 9 INJECTION, SOLUTION INTRAVENOUS at 07:36

## 2019-09-24 RX ADMIN — ONDANSETRON 4 MG: 2 INJECTION INTRAMUSCULAR; INTRAVENOUS at 07:39

## 2019-09-24 RX ADMIN — ONDANSETRON 4 MG: 2 INJECTION INTRAMUSCULAR; INTRAVENOUS at 18:02

## 2019-09-24 NOTE — ED NOTES
"Pt was seen last night for same complaint. Pt went to her PCP today, and was sent back to ED \"For IV fluids and to be admitted. She told me I didn't look good when I went in\". Pt complaining of abd pain and nausea for a month.      Dalton Dempsey, RN  09/24/19 5864    "

## 2019-09-24 NOTE — PROGRESS NOTES
Chief Complaint   Patient presents with   • Vomiting   • Abdominal Pain     HPI    Eunice Ennis is a  58 y.o. female here for a follow up visit for vomiting.  She has a history of anxiety, diabetes, hyperlipidemia, and hypothyroidism.    Patient was seen today in the emergency room at [facility] for complaints of vomiting.  This is an established patient of Dr. Trimble's, new to me.    I reviewed ER visit.  Glucose 161.  Slight elevation of neutrophils at 85.1%, hemoglobin 15.5, white blood cell 6.92.  BUN 21, creatinine 0.7, normal liver function test.  Lipase 20.  From what I can tell IV was attempted but unsuccessful.  Patient did not have a CAT scan.    Previously patient was seen by Dr. Trimble in February of this year for dyspepsia and constipation and was recommended to have a EGD and colonoscopy.  I reviewed procedures dated 2/22/2019 as follows.    EGD--grade C esophagitis.  Gastritis.  Normal duodenum.  Colonoscopy--normal.  Recall 10 years.  Pathology--+ H. Pylori. Recall EGD 3 months.   Recommendations--Carafate suspension x2 weeks.  Start daily PPI therapy.    Patient was treated with Prevpac and follow-up breath test was negative for H. pylori.  She canceled follow-up EGD scheduled in May.    Today patient complains of significant abdominal pain described as an aching/stabbing sensation that is constant in nature.  There is associated vomiting.  Continuous nausea.  Symptoms started several days ago.  Patient then goes on to tell me she was admitted to [facility] last month for similar symptoms.  Patient herself is unsure of etiology of pain but upon review of records in epic she was found to have abdominal wall abscess which was not able to be drained.  She was treated with Bactrim, improved, and discharged home.    Patient denies diarrhea or constipation.  No rectal bleeding.    She is off of pantoprazole 40 mg once daily.  She is also off of Carafate.    Past Medical History:   Diagnosis Date   •  Anxiety    • Asthma    • Bladder disorder    • Diabetes mellitus (CMS/HCC)    • Hyperlipidemia    • Hypothyroidism    • MRSA (methicillin resistant Staphylococcus aureus) 2010    spider bite - blood    • Thyroid disease        Past Surgical History:   Procedure Laterality Date   • BLADDER SURGERY     • COLONOSCOPY W/ POLYPECTOMY N/A 2/21/2019    Procedure: COLONOSCOPY TO CECUM;  Surgeon: Rony Trimble MD;  Location: [facility] ENDOSCOPY;  Service: Gastroenterology   • ENDOSCOPY N/A 2/21/2019    Procedure: ESOPHAGOGASTRODUODENOSCOPY WITH BIOPSY;  Surgeon: Rony Trimble MD;  Location: [facility] ENDOSCOPY;  Service: Gastroenterology   • EXPLORATORY LAPAROTOMY     • PARTIAL HYSTERECTOMY     • TONSILLECTOMY         Scheduled Meds:  Outpatient Encounter Medications as of 9/24/2019   Medication Sig Dispense Refill   • atorvastatin (LIPITOR) 20 MG tablet TAKE 1 TABLET BY MOUTH ONE TIME A DAY  90 tablet 1   • budesonide-formoterol (SYMBICORT) 160-4.5 MCG/ACT inhaler Inhale 2 puffs 2 (Two) Times a Day. 1 inhaler 12   • busPIRone (BUSPAR) 10 MG tablet TAKE ONE TABLET BY MOUTH TWICE A DAY 60 tablet 4   • escitalopram (LEXAPRO) 20 MG tablet TAKE ONE TABLET BY MOUTH DAILY 30 tablet 3   • HUMALOG KWIKPEN 100 UNIT/ML solution pen-injector ss     • lansoprazole (PREVACID) 30 MG capsule Take 1 capsule by mouth 2 (Two) Times a Day. 20 capsule 0   • levothyroxine (SYNTHROID, LEVOTHROID) 150 MCG tablet TAKE ONE TABLET BY MOUTH DAILY 30 tablet 0   • loratadine (CLARITIN) 10 MG tablet Take 1 tablet by mouth daily. 30 tablet 5   • metFORMIN (GLUCOPHAGE) 500 MG tablet TAKE 2 TABLETS BY MOUTH TWO TIMES A DAY WITH MEALS 120 tablet 5   • pantoprazole (PROTONIX) 40 MG EC tablet Take 1 tablet by mouth Daily. 30 tablet 11   • promethazine (PHENERGAN) 25 MG tablet Take 1 tablet by mouth Every 6 (Six) Hours As Needed for Nausea. 20 tablet 0   • Semaglutide (OZEMPIC SC) Inject 5 Units/day under the skin. weekly     • sucralfate (CARAFATE)  1 GM/10ML suspension Take 10 mL by mouth 4 (Four) Times a Day Before Meals & at Bedtime. 600 mL 2   • traMADol (ULTRAM) 50 MG tablet Take 1 tablet by mouth Every 4 (Four) Hours As Needed for Moderate Pain . 15 tablet 0   • VESICARE 5 MG tablet      • [DISCONTINUED] PARoxetine (PAXIL) 10 MG tablet Take 1 tablet by mouth Every Morning. 30 tablet 5     Facility-Administered Encounter Medications as of 9/24/2019   Medication Dose Route Frequency Provider Last Rate Last Dose   • [COMPLETED] morphine injection 4 mg  4 mg Intravenous Once Bryan Johnson MD   4 mg at 09/24/19 0822   • [COMPLETED] ondansetron (ZOFRAN) injection 4 mg  4 mg Intravenous Once Bryan Johnson MD   4 mg at 09/24/19 0739   • [COMPLETED] ondansetron (ZOFRAN) injection 4 mg  4 mg Intravenous Once Bryan Johnson MD   4 mg at 09/24/19 0822   • [COMPLETED] ondansetron ODT (ZOFRAN-ODT) disintegrating tablet 4 mg  4 mg Oral Once Bryan Johnson MD   4 mg at 09/24/19 0120   • [COMPLETED] sodium chloride 0.9 % bolus 1,000 mL  1,000 mL Intravenous Once Bryan Johnson MD   Stopped at 09/24/19 0858   • [DISCONTINUED] promethazine (PHENERGAN) injection 12.5 mg  12.5 mg Intravenous Once Bryan Johnson MD       • [DISCONTINUED] sodium chloride 0.9 % flush 10 mL  10 mL Intravenous PRN Bryan Johnson MD       • [DISCONTINUED] sodium chloride 0.9 % infusion  125 mL/hr Intravenous Continuous Bryan Johnson MD           Continuous Infusions:  No current facility-administered medications for this visit.     PRN Meds:.    Allergies   Allergen Reactions   • Bactrim [Sulfamethoxazole-Trimethoprim] GI Intolerance       Social History     Socioeconomic History   • Marital status:      Spouse name: Not on file   • Number of children: Not on file   • Years of education: Not on file   • Highest education level: Not on file   Tobacco Use   • Smoking status: Former Smoker     Years: 1.00     Types: Cigarettes   • Smokeless tobacco: Never Used   Substance and Sexual Activity   •  Alcohol use: No   • Drug use: No   • Sexual activity: Defer       Family History   Problem Relation Age of Onset   • Diabetes Mother    • Hypertension Sister        Review of Systems   Constitutional: Negative for activity change, appetite change, fatigue, fever and unexpected weight change.   HENT: Negative for trouble swallowing.    Respiratory: Negative for apnea, cough, choking, chest tightness, shortness of breath and wheezing.    Cardiovascular: Negative for chest pain, palpitations and leg swelling.   Gastrointestinal: Positive for abdominal pain, nausea and vomiting. Negative for abdominal distention, anal bleeding, blood in stool, constipation, diarrhea and rectal pain.       There were no vitals filed for this visit.    Physical Exam   Constitutional: She is oriented to person, place, and time. She appears well-developed and well-nourished.   Eyes: Pupils are equal, round, and reactive to light.   Cardiovascular: Normal rate, regular rhythm and normal heart sounds.   Pulmonary/Chest: Effort normal and breath sounds normal. No respiratory distress. She has no wheezes.   Abdominal: Soft. Bowel sounds are normal. She exhibits no distension and no mass. There is tenderness. There is guarding. No hernia.   Diffuse abdominal wall tenderness to palpation.   Musculoskeletal: Normal range of motion.   Neurological: She is alert and oriented to person, place, and time.   Skin: Skin is warm and dry. Capillary refill takes less than 2 seconds.   Psychiatric: She has a normal mood and affect. Her behavior is normal.       No images are attached to the encounter.    Eunice was seen today for vomiting and abdominal pain.    Diagnoses and all orders for this visit:    Generalized abdominal pain    Nausea and vomiting, intractability of vomiting not specified, unspecified vomiting type    History of Helicobacter pylori infection      Impression:    58-year-old female seen today for generalized abdominal pain with nausea  and vomiting seen in the ER this morning.  Patient also hospitalized last month at [facility] for abdominal wall abscess.  Given the severity of her symptoms I recommend she return to the emergency room for further evaluation.  Will likely need CT of abdomen and pelvis as symptoms are certainly concerning that abscess is still present.  Patient was accompanied by staff in wheelchair to the emergency room.  I did call report to the triage department with my concerns.      Follow-up pending ER evaluation.

## 2019-09-24 NOTE — ED NOTES
"Unable to obtain blood at this time. Pt states \"Good luck getting urine, I cant drink so I cant give you a sample.\" Pt given sample cup and asked to provide if she has the urge to go.      Dalton Dempsey, RN  09/24/19 7368    "

## 2019-09-24 NOTE — ED PROVIDER NOTES
EMERGENCY DEPARTMENT ENCOUNTER    CHIEF COMPLAINT  Chief Complaint: abdominal pain  History given by: patient  History limited by: nothing  Room Number: 37/37  PMD: Chandrika Hernandez APRN      HPI:  Pt is a 58 y.o. female who presents complaining of diffuse abdominal pain characterized as 'cramping' that started 3 days ago. Pt also c/o nausea and vomiting. Pt was seen and discharged from the ED early this morning for similar complaints. She was evaluated by GI in the office today and was instructed to present to the ED for a CT abd/pelvis. Pt denies CP, SOA, urinary sx and all other complaints at this time. Pt has taken Phenergan at home without much relief of symptoms. She notes a history of abscess to the wall of the stomach. She feels like her abscess may have returned, as her symptoms are familiar.     Duration:  3 days  Onset: gradual  Timing: constant  Location: diffuse abd  Radiation: none  Quality: pain  Intensity/Severity: moderate  Progression: unchanged  Associated Symptoms: N/V  Aggravating Factors: none  Alleviating Factors: none  Previous Episodes: none  Treatment before arrival: Phenergan    PAST MEDICAL HISTORY  Active Ambulatory Problems     Diagnosis Date Noted   • Hypothyroid 07/01/2016   • Diabetes mellitus (CMS/HCC) 07/01/2016   • Constipation 02/05/2019   • Dyspepsia 02/05/2019   • Esophagitis 03/19/2019   • Bacterial infection due to Helicobacter pylori 03/19/2019     Resolved Ambulatory Problems     Diagnosis Date Noted   • No Resolved Ambulatory Problems     Past Medical History:   Diagnosis Date   • Anxiety    • Asthma    • Bladder disorder    • Diabetes mellitus (CMS/[facility])    • Hyperlipidemia    • Hypothyroidism    • MRSA (methicillin resistant Staphylococcus aureus) 2010   • Thyroid disease        PAST SURGICAL HISTORY  Past Surgical History:   Procedure Laterality Date   • BLADDER SURGERY     • COLONOSCOPY W/ POLYPECTOMY N/A 2/21/2019    Procedure: COLONOSCOPY TO CECUM;  Surgeon:  Rony Trimble MD;  Location: [facility] ENDOSCOPY;  Service: Gastroenterology   • ENDOSCOPY N/A 2/21/2019    Procedure: ESOPHAGOGASTRODUODENOSCOPY WITH BIOPSY;  Surgeon: Rony Trimble MD;  Location: [facility] ENDOSCOPY;  Service: Gastroenterology   • EXPLORATORY LAPAROTOMY     • PARTIAL HYSTERECTOMY     • TONSILLECTOMY         FAMILY HISTORY  Family History   Problem Relation Age of Onset   • Diabetes Mother    • Hypertension Sister        SOCIAL HISTORY  Social History     Socioeconomic History   • Marital status:      Spouse name: Not on file   • Number of children: Not on file   • Years of education: Not on file   • Highest education level: Not on file   Tobacco Use   • Smoking status: Former Smoker     Years: 1.00     Types: Cigarettes   • Smokeless tobacco: Never Used   Substance and Sexual Activity   • Alcohol use: No   • Drug use: No   • Sexual activity: Defer       ALLERGIES  Bactrim [sulfamethoxazole-trimethoprim]    REVIEW OF SYSTEMS  Review of Systems   Constitutional: Negative for fever.   HENT: Negative for sore throat.    Eyes: Negative.    Respiratory: Negative for cough and shortness of breath.    Cardiovascular: Negative for chest pain.   Gastrointestinal: Positive for abdominal pain, nausea and vomiting. Negative for diarrhea.   Genitourinary: Negative for dysuria.   Musculoskeletal: Negative for neck pain.   Skin: Negative for rash.   Allergic/Immunologic: Negative.    Neurological: Negative for weakness, numbness and headaches.   Hematological: Negative.    Psychiatric/Behavioral: Negative.    All other systems reviewed and are negative.      PHYSICAL EXAM  ED Triage Vitals [09/24/19 1415]   Temp Heart Rate Resp BP SpO2   96.9 °F (36.1 °C) 92 16 158/76 95 %     Physical Exam   Constitutional: She is oriented to person, place, and time and well-developed, well-nourished, and in no distress. No distress.   HENT:   Head: Normocephalic.   Mouth/Throat: Mucous membranes are normal.    Eyes: EOM are normal.   Neck: Normal range of motion.   Cardiovascular: Normal rate and regular rhythm. Exam reveals no gallop and no friction rub.   No murmur heard.  Pulmonary/Chest: Effort normal. No respiratory distress. She has no wheezes. She has no rhonchi. She has no rales.   Abdominal: Soft. She exhibits no distension. There is no tenderness. There is no guarding.   Musculoskeletal: Normal range of motion. She exhibits no deformity.   Neurological: She is alert and oriented to person, place, and time. GCS score is 15.   moving all extremities, no focal deficits   Skin: Skin is warm and dry.   Psychiatric:   Calm, cooperative   Nursing note and vitals reviewed.      LAB RESULTS  Lab Results (last 24 hours)     Procedure Component Value Units Date/Time    POC Glucose Once [[id number]]  (Abnormal) Collected:  09/24/19 0037    Specimen:  Blood Updated:  09/24/19 0039     Glucose 161 mg/dL     POC Glucose Once [[id number]]  (Abnormal) Collected:  09/24/19 0512    Specimen:  Blood Updated:  09/24/19 0513     Glucose 151 mg/dL     CBC & Differential [[id number]] Collected:  09/24/19 0732    Specimen:  Blood Updated:  09/24/19 0747    Narrative:       The following orders were created for panel order CBC & Differential.  Procedure                               Abnormality         Status                     ---------                               -----------         ------                     CBC Auto Differential[[id number]]        Abnormal            Final result                 Please view results for these tests on the individual orders.    Comprehensive Metabolic Panel [[id number]]  (Abnormal) Collected:  09/24/19 0732    Specimen:  Blood Updated:  09/24/19 0809     Glucose 164 mg/dL      BUN 21 mg/dL      Creatinine 0.70 mg/dL      Sodium 137 mmol/L      Potassium 4.7 mmol/L      Chloride 94 mmol/L      CO2 25.4 mmol/L      Calcium 9.5 mg/dL      Total Protein 8.4 g/dL      Albumin 4.90 g/dL      ALT (SGPT) 14  U/L      AST (SGOT) 18 U/L      Comment: Specimen hemolyzed.  Results may be affected.        Alkaline Phosphatase 85 U/L      Total Bilirubin 0.6 mg/dL      eGFR Non African Amer 87 mL/min/1.73      Globulin 3.5 gm/dL      A/G Ratio 1.4 g/dL      BUN/Creatinine Ratio 30.0     Anion Gap 17.6 mmol/L     Narrative:       GFR Normal >60  Chronic Kidney Disease <60  Kidney Failure <15    Lipase [[id number]]  (Normal) Collected:  09/24/19 0732    Specimen:  Blood Updated:  09/24/19 0809     Lipase 20 U/L     CBC Auto Differential [[id number]]  (Abnormal) Collected:  09/24/19 0732    Specimen:  Blood Updated:  09/24/19 0747     WBC 9.62 10*3/mm3      RBC 5.14 10*6/mm3      Hemoglobin 15.5 g/dL      Hematocrit 45.3 %      MCV 88.1 fL      MCH 30.2 pg      MCHC 34.2 g/dL      RDW 12.4 %      RDW-SD 40.0 fl      MPV 9.2 fL      Platelets 243 10*3/mm3      Neutrophil % 85.1 %      Lymphocyte % 12.1 %      Monocyte % 2.2 %      Eosinophil % 0.0 %      Basophil % 0.2 %      Immature Grans % 0.4 %      Neutrophils, Absolute 8.19 10*3/mm3      Lymphocytes, Absolute 1.16 10*3/mm3      Monocytes, Absolute 0.21 10*3/mm3      Eosinophils, Absolute 0.00 10*3/mm3      Basophils, Absolute 0.02 10*3/mm3      Immature Grans, Absolute 0.04 10*3/mm3      nRBC 0.0 /100 WBC     CBC & Differential [[id number]] Collected:  09/24/19 1730    Specimen:  Blood Updated:  09/24/19 1743    Narrative:       The following orders were created for panel order CBC & Differential.  Procedure                               Abnormality         Status                     ---------                               -----------         ------                     CBC Auto Differential[[id number]]        Abnormal            Final result                 Please view results for these tests on the individual orders.    Comprehensive Metabolic Panel [[id number]]  (Abnormal) Collected:  09/24/19 1730    Specimen:  Blood from Arm, Right Updated:  09/24/19 1805     Glucose 199  mg/dL      BUN 23 mg/dL      Creatinine 0.71 mg/dL      Sodium 138 mmol/L      Potassium 4.3 mmol/L      Chloride 97 mmol/L      CO2 24.2 mmol/L      Calcium 9.6 mg/dL      Total Protein 8.3 g/dL      Albumin 4.60 g/dL      ALT (SGPT) 13 U/L      AST (SGOT) 14 U/L      Alkaline Phosphatase 71 U/L      Total Bilirubin 0.6 mg/dL      eGFR Non African Amer 85 mL/min/1.73      Globulin 3.7 gm/dL      A/G Ratio 1.2 g/dL      BUN/Creatinine Ratio 32.4     Anion Gap 16.8 mmol/L     Narrative:       GFR Normal >60  Chronic Kidney Disease <60  Kidney Failure <15    Lipase [[id number]]  (Normal) Collected:  09/24/19 1730    Specimen:  Blood from Arm, Right Updated:  09/24/19 1805     Lipase 19 U/L     CBC Auto Differential [[id number]]  (Abnormal) Collected:  09/24/19 1730    Specimen:  Blood from Arm, Right Updated:  09/24/19 1743     WBC 12.93 10*3/mm3      RBC 5.07 10*6/mm3      Hemoglobin 15.1 g/dL      Hematocrit 46.0 %      MCV 90.7 fL      MCH 29.8 pg      MCHC 32.8 g/dL      RDW 12.8 %      RDW-SD 42.1 fl      MPV 9.4 fL      Platelets 258 10*3/mm3      Neutrophil % 82.6 %      Lymphocyte % 10.8 %      Monocyte % 5.9 %      Eosinophil % 0.0 %      Basophil % 0.2 %      Immature Grans % 0.5 %      Neutrophils, Absolute 10.68 10*3/mm3      Lymphocytes, Absolute 1.40 10*3/mm3      Monocytes, Absolute 0.76 10*3/mm3      Eosinophils, Absolute 0.00 10*3/mm3      Basophils, Absolute 0.03 10*3/mm3      Immature Grans, Absolute 0.06 10*3/mm3      nRBC 0.0 /100 WBC           I ordered the above labs and reviewed the results    RADIOLOGY  CT Abdomen Pelvis With Contrast   Final Result           1. Appearance of wall thickening in the distal esophagus could be result   of inflammation or potentially lesion. Endoscopic correlation advised as   indicated.   2. No abdominal wall abscess is noted.   3. No obstructive uropathy.       Discussed by telephone with Dr. Almazan at 1850, 09/24/2019.       This report was finalized on 9/24/2019  6:53 PM by Dr. Te Orourke M.D.               I ordered the above noted radiological studies. Interpreted by radiologist. Reviewed by me in PACS.         PROGRESS AND CONSULTS  ED Course as of Sep 24 1916   Tue Sep 24, 2019   1747 Patient evaluated in the emergency department last night for abdominal pain, followed up with GI today in the office and referred back here for a CT scan of the abdomen and pelvis.  [DC]   1912 On review, patient's laboratory work-up is mostly unremarkable with only very mild leukocytosis of 12.9, which is elevated from 9 earlier today.  Renal function is normal with a creatinine of 0.7 and no other acute concerning electrolyte abnormalities.  Lipase is normal at 19.  CT scan shows no acute emergent processes, although there is some inflammation noted at the distal esophagus which is likely the patient's source of pain at this point.  No evidence of abdominal wall abscess or other acute intra-abdominal emergent process.  Patient will be given an Zofran ODT prescription, have been given advice for liquid/purée diet for now, and advised to follow-up closely with GI tomorrow to discuss symptoms and findings today.  She has been advised to return to the emergency department for worsening symptoms as needed.  She does appear to be significant improved after IV Zofran and pain control in the emergency department and has received 1 L of IV fluids.  [DC]      ED Course User Index  [DC] Chandler Almazan MD     1745. Ordered Zofran for nausea.     1746. Ordered labs and CT abd/pelvis.     1851. Per Dr Orourke, no abscess seen on CT abd/pelvis. No acute findings.      1907. BP- (!) 188/88 HR- 104 Temp- 96.9 °F (36.1 °C) (Tympanic) O2 sat- 97%  Rechecked the patient who is in NAD and states her nausea has significantly improved. Informed pt of CT abd/pelvis showing nothing acute, particularly no abscess. Discussed plan to discharge with Zofran. Pt understands and agrees with the plan, all  questions answered.      MEDICAL DECISION MAKING  Results were reviewed/discussed with the patient and they were also made aware of online access. Pt also made aware that some labs, such as cultures, will not be resulted during ER visit and follow up with PMD is necessary.     MDM  Number of Diagnoses or Management Options     Amount and/or Complexity of Data Reviewed  Clinical lab tests: ordered and reviewed (WBC 12.93)  Tests in the radiology section of CPT®: ordered and reviewed (Nothing acute seen on CT abd/pelvis)  Discussion of test results with the performing providers: yes (Dr Orourke, radiology)  Decide to obtain previous medical records or to obtain history from someone other than the patient: yes (Epic)  Review and summarize past medical records: yes (Pt discharged from ED this morning.)    Patient Progress  Patient progress: stable         DIAGNOSIS  Final diagnoses:   Esophagitis       DISPOSITION  DISCHARGE    Patient discharged in stable condition.    Reviewed implications of results, diagnosis, meds, responsibility to follow up, warning signs and symptoms of possible worsening, potential complications and reasons to return to ER.    Patient/Family voiced understanding of above instructions.    Discussed plan for discharge, as there is no emergent indication for admission. Patient referred to primary care provider for BP management due to today's BP. Pt/family is agreeable and understands need for follow up and repeat testing.  Pt is aware that discharge does not mean that nothing is wrong but it indicates no emergency is present that requires admission and they must continue care with follow-up as given below or physician of their choice.     FOLLOW-UP  [facility] Emergency Department  [address] [address] [phone]    As needed, If symptoms worsen    [facility] GASTROENTEROLOGY  [address]  [place]  [address]  [phone]  Schedule an appointment as soon as possible for a visit   for close follow up         Medication List      New Prescriptions    ondansetron ODT 4 MG disintegrating tablet  Commonly known as:  ZOFRAN-ODT  Take 1 tablet by mouth Every 8 (Eight) Hours As Needed for Nausea or   Vomiting for up to 3 days.            Latest Documented Vital Signs:  As of 7:16 PM  BP- (!) 188/88 HR- 104 Temp- 96.9 °F (36.1 °C) (Tympanic) O2 sat- 97%    --  Documentation assistance provided by lissa Singh for Dr Tha MD.  Information recorded by the scribe was done at my direction and has been verified and validated by me.       Hanna Singh  09/24/19 1917       Chandler Almazan MD  09/24/19 2115

## 2019-09-24 NOTE — ED NOTES
Attempt to collect blood x3 with 23g butterfly needles with no success at this time.     Ericka Noe RN  09/24/19 012

## 2019-09-24 NOTE — ED PROVIDER NOTES
" EMERGENCY DEPARTMENT ENCOUNTER    CHIEF COMPLAINT  Chief Complaint: vomiting  History given by: patient  History limited by: none  Room Number: 12/12  PMD: Chandrika Hernandez APRN      HPI:  Pt is a 58 y.o. female who presents with hx of T2DM complaining of vomiting that began yesterday. Pt states that yesterday her glucose was \"to high to read.\" Pt also complains of nausea and diffuse abd cramps. Pt states that over the past month she has had intermittent episodes of vomiting. Pt denies hx of DKA and pancreatitis.     Duration:  One day  Onset: gradual  Timing: constant  Location: GI  Intensity/Severity: moderate  Progression: unchanged  Associated Symptoms: nausea, abd cramps, hypoerglycemia  Previous Episodes: intermittent vomiting for one month  Treatment before arrival: none    PAST MEDICAL HISTORY  Active Ambulatory Problems     Diagnosis Date Noted   • Hypothyroid 07/01/2016   • Diabetes mellitus (CMS/HCC) 07/01/2016   • Constipation 02/05/2019   • Dyspepsia 02/05/2019   • Esophagitis 03/19/2019   • Bacterial infection due to Helicobacter pylori 03/19/2019     Resolved Ambulatory Problems     Diagnosis Date Noted   • No Resolved Ambulatory Problems     Past Medical History:   Diagnosis Date   • Anxiety    • Asthma    • Bladder disorder    • Diabetes mellitus (CMS/[facility])    • Hyperlipidemia    • Hypothyroidism    • MRSA (methicillin resistant Staphylococcus aureus) 2010   • Thyroid disease        PAST SURGICAL HISTORY  Past Surgical History:   Procedure Laterality Date   • BLADDER SURGERY     • COLONOSCOPY W/ POLYPECTOMY N/A 2/21/2019    Procedure: COLONOSCOPY TO CECUM;  Surgeon: Rony Trimble MD;  Location: [facility] ENDOSCOPY;  Service: Gastroenterology   • ENDOSCOPY N/A 2/21/2019    Procedure: ESOPHAGOGASTRODUODENOSCOPY WITH BIOPSY;  Surgeon: Rony Trimble MD;  Location: [facility] ENDOSCOPY;  Service: Gastroenterology   • EXPLORATORY LAPAROTOMY     • PARTIAL HYSTERECTOMY     • TONSILLECTOMY  "        FAMILY HISTORY  Family History   Problem Relation Age of Onset   • Diabetes Mother    • Hypertension Sister        SOCIAL HISTORY  Social History     Socioeconomic History   • Marital status:      Spouse name: Not on file   • Number of children: Not on file   • Years of education: Not on file   • Highest education level: Not on file   Tobacco Use   • Smoking status: Former Smoker     Years: 1.00     Types: Cigarettes   • Smokeless tobacco: Never Used   Substance and Sexual Activity   • Alcohol use: No   • Drug use: No   • Sexual activity: Defer       ALLERGIES  Bactrim [sulfamethoxazole-trimethoprim]    REVIEW OF SYSTEMS  Review of Systems   Constitutional: Negative for fever.        + hyperglycemia   HENT: Negative for sore throat.    Eyes: Negative.    Respiratory: Negative for cough and shortness of breath.    Cardiovascular: Negative for chest pain.   Gastrointestinal: Positive for abdominal pain, nausea and vomiting. Negative for diarrhea.   Genitourinary: Negative for dysuria.   Musculoskeletal: Negative for neck pain.   Skin: Negative for rash.   Allergic/Immunologic: Negative.    Neurological: Negative for weakness, numbness and headaches.   Hematological: Negative.    Psychiatric/Behavioral: Negative.    All other systems reviewed and are negative.      PHYSICAL EXAM  ED Triage Vitals [09/24/19 0034]   Temp Heart Rate Resp BP SpO2   97.3 °F (36.3 °C) 73 16 178/92 93 %      Temp src Heart Rate Source Patient Position BP Location FiO2 (%)   Tympanic Monitor -- -- --       Physical Exam   Constitutional: She is oriented to person, place, and time. No distress.   HENT:   Head: Normocephalic and atraumatic.   Eyes: EOM are normal. Pupils are equal, round, and reactive to light.   Neck: Normal range of motion. Neck supple.   Cardiovascular: Regular rhythm and normal heart sounds. Tachycardia present.   Pulmonary/Chest: Effort normal and breath sounds normal. No respiratory distress.   CTAB    Abdominal: Soft. She exhibits no mass. There is tenderness (diffuse). There is no rebound and no guarding.   Musculoskeletal: Normal range of motion. She exhibits no edema.   unremarkable   Neurological: She is alert and oriented to person, place, and time. She has normal sensation and normal strength.   Skin: Skin is warm and dry. No rash noted.   Psychiatric: Mood and affect normal.   Nursing note and vitals reviewed.      LAB RESULTS  Lab Results (last 24 hours)     ** No results found for the last 24 hours. **          I ordered the above labs and reviewed the results    RADIOLOGY  No orders to display        I ordered the above noted radiological studies. Interpreted by radiologist. Reviewed by me in PACS.       PROCEDURES  Procedures      PROGRESS AND CONSULTS     0512-Ordered POC glucose for further evaluation, zofran for nausea, and IVF. Will review lab work.       MEDICAL DECISION MAKING  Results were reviewed/discussed with the patient and they were also made aware of online access. Pt also made aware that some labs, such as cultures, will not be resulted during ER visit and follow up with PMD is necessary.     MDM       DIAGNOSIS  Final diagnoses:   Non-intractable vomiting with nausea, unspecified vomiting type       DISPOSITION  DISCHARGED    Latest Documented Vital Signs:  As of 11:02 PM  BP- 150/75 HR- 94 Temp- 97.3 °F (36.3 °C) (Oral) O2 sat- 100%    --  Documentation assistance provided by lissa Bragg for .  Information recorded by the scribe was done at my direction and has been verified and validated by me.     Jenniffer Bragg  09/24/19 5079       Bryan Johnson MD  09/25/19 3328

## 2019-09-24 NOTE — ED NOTES
Pt arrives from [facility] NP office with c/o abd pain and nausea. Pt seen here last night for the same and is no better today.      Arti Morton, RN  09/24/19 3792

## 2019-09-24 NOTE — DISCHARGE INSTRUCTIONS
Take medications as prescribed, obtain a refill of your previously prescribed Carafate as that prescription had 2 refills granted to it.  Liquid/soft diet and close GI follow-up tomorrow.  Return to the emergency department for worsening symptoms as needed.

## 2019-09-24 NOTE — ED TRIAGE NOTES
Pt reports abdominal pain today and vomiting since 11am this morning.  Pt is diabetic and reports hyperglycemia. Pt states her sugar was too high for machine to give reading.  Pt is nasueous during triage. Pt with BG of 161 at this time with POC

## 2019-09-25 RX ORDER — CLINDAMYCIN HYDROCHLORIDE 300 MG/1
CAPSULE ORAL
Qty: 30 CAPSULE | Refills: 0 | OUTPATIENT
Start: 2019-09-25

## 2019-09-27 ENCOUNTER — ANESTHESIA (OUTPATIENT)
Dept: GASTROENTEROLOGY | Facility: HOSPITAL | Age: 59
End: 2019-09-27

## 2019-09-27 ENCOUNTER — HOSPITAL ENCOUNTER (INPATIENT)
Facility: HOSPITAL | Age: 59
LOS: 4 days | Discharge: HOME | End: 2019-10-01
Attending: HOSPITALIST | Admitting: HOSPITALIST

## 2019-09-27 ENCOUNTER — APPOINTMENT (OUTPATIENT)
Dept: ULTRASOUND IMAGING | Facility: HOSPITAL | Age: 59
End: 2019-09-27

## 2019-09-27 ENCOUNTER — ANESTHESIA EVENT (OUTPATIENT)
Dept: GASTROENTEROLOGY | Facility: HOSPITAL | Age: 59
End: 2019-09-27

## 2019-09-27 DIAGNOSIS — R11.2 INTRACTABLE VOMITING WITH NAUSEA, UNSPECIFIED VOMITING TYPE: ICD-10-CM

## 2019-09-27 DIAGNOSIS — K82.8 BILIARY DYSKINESIA: ICD-10-CM

## 2019-09-27 DIAGNOSIS — K20.90 ESOPHAGITIS: Primary | ICD-10-CM

## 2019-09-27 PROBLEM — E78.5 HLD (HYPERLIPIDEMIA): Status: ACTIVE | Noted: 2019-09-27

## 2019-09-27 LAB
ANION GAP SERPL CALCULATED.3IONS-SCNC: 18.1 MMOL/L (ref 5–15)
BASOPHILS # BLD MANUAL: 0.21 10*3/MM3 (ref 0–0.2)
BASOPHILS NFR BLD AUTO: 2 % (ref 0–1.5)
BUN BLD-MCNC: 18 MG/DL (ref 6–20)
BUN/CREAT SERPL: 29 (ref 7–25)
CALCIUM SPEC-SCNC: 8.6 MG/DL (ref 8.6–10.5)
CHLORIDE SERPL-SCNC: 98 MMOL/L (ref 98–107)
CO2 SERPL-SCNC: 21.9 MMOL/L (ref 22–29)
CREAT BLD-MCNC: 0.62 MG/DL (ref 0.57–1)
DEPRECATED RDW RBC AUTO: 40.8 FL (ref 37–54)
ERYTHROCYTE [DISTWIDTH] IN BLOOD BY AUTOMATED COUNT: 12.6 % (ref 12.3–15.4)
GFR SERPL CREATININE-BSD FRML MDRD: 100 ML/MIN/1.73
GLUCOSE BLD-MCNC: 126 MG/DL (ref 65–99)
GLUCOSE BLDC GLUCOMTR-MCNC: 127 MG/DL (ref 70–130)
GLUCOSE BLDC GLUCOMTR-MCNC: 135 MG/DL (ref 70–130)
GLUCOSE BLDC GLUCOMTR-MCNC: 163 MG/DL (ref 70–130)
HCT VFR BLD AUTO: 43.5 % (ref 34–46.6)
HGB BLD-MCNC: 14.5 G/DL (ref 12–15.9)
LYMPHOCYTES # BLD MANUAL: 4.32 10*3/MM3 (ref 0.7–3.1)
LYMPHOCYTES NFR BLD MANUAL: 41 % (ref 19.6–45.3)
LYMPHOCYTES NFR BLD MANUAL: 5 % (ref 5–12)
MCH RBC QN AUTO: 29.5 PG (ref 26.6–33)
MCHC RBC AUTO-ENTMCNC: 33.3 G/DL (ref 31.5–35.7)
MCV RBC AUTO: 88.4 FL (ref 79–97)
MONOCYTES # BLD AUTO: 0.53 10*3/MM3 (ref 0.1–0.9)
NEUTROPHILS # BLD AUTO: 5.48 10*3/MM3 (ref 1.7–7)
NEUTROPHILS NFR BLD MANUAL: 52 % (ref 42.7–76)
PLAT MORPH BLD: NORMAL
PLATELET # BLD AUTO: 204 10*3/MM3 (ref 140–450)
PMV BLD AUTO: 9.2 FL (ref 6–12)
POTASSIUM BLD-SCNC: 3.9 MMOL/L (ref 3.5–5.2)
RBC # BLD AUTO: 4.92 10*6/MM3 (ref 3.77–5.28)
RBC MORPH BLD: NORMAL
SMUDGE CELLS BLD QL SMEAR: ABNORMAL
SODIUM BLD-SCNC: 138 MMOL/L (ref 136–145)
TROPONIN T SERPL-MCNC: <0.01 NG/ML (ref 0–0.03)
WBC NRBC COR # BLD: 10.54 10*3/MM3 (ref 3.4–10.8)

## 2019-09-27 PROCEDURE — 25010000002 MORPHINE PER 10 MG: Performed by: HOSPITALIST

## 2019-09-27 PROCEDURE — 99254 IP/OBS CNSLTJ NEW/EST MOD 60: CPT | Performed by: INTERNAL MEDICINE

## 2019-09-27 PROCEDURE — 88305 TISSUE EXAM BY PATHOLOGIST: CPT | Performed by: INTERNAL MEDICINE

## 2019-09-27 PROCEDURE — 43239 EGD BIOPSY SINGLE/MULTIPLE: CPT | Performed by: INTERNAL MEDICINE

## 2019-09-27 PROCEDURE — 93005 ELECTROCARDIOGRAM TRACING: CPT | Performed by: NURSE PRACTITIONER

## 2019-09-27 PROCEDURE — 85007 BL SMEAR W/DIFF WBC COUNT: CPT | Performed by: NURSE PRACTITIONER

## 2019-09-27 PROCEDURE — 93010 ELECTROCARDIOGRAM REPORT: CPT | Performed by: INTERNAL MEDICINE

## 2019-09-27 PROCEDURE — G0378 HOSPITAL OBSERVATION PER HR: HCPCS

## 2019-09-27 PROCEDURE — 25010000002 PROPOFOL 10 MG/ML EMULSION: Performed by: ANESTHESIOLOGY

## 2019-09-27 PROCEDURE — 25010000002 PROMETHAZINE PER 50 MG: Performed by: NURSE PRACTITIONER

## 2019-09-27 PROCEDURE — 82962 GLUCOSE BLOOD TEST: CPT

## 2019-09-27 PROCEDURE — 25010000002 ONDANSETRON PER 1 MG: Performed by: HOSPITALIST

## 2019-09-27 PROCEDURE — 88312 SPECIAL STAINS GROUP 1: CPT | Performed by: INTERNAL MEDICINE

## 2019-09-27 PROCEDURE — 63710000001 INSULIN LISPRO (HUMAN) PER 5 UNITS: Performed by: NURSE PRACTITIONER

## 2019-09-27 PROCEDURE — 76700 US EXAM ABDOM COMPLETE: CPT

## 2019-09-27 PROCEDURE — 84484 ASSAY OF TROPONIN QUANT: CPT | Performed by: HOSPITALIST

## 2019-09-27 PROCEDURE — 85025 COMPLETE CBC W/AUTO DIFF WBC: CPT | Performed by: NURSE PRACTITIONER

## 2019-09-27 PROCEDURE — 0DB58ZX EXCISION OF ESOPHAGUS, VIA NATURAL OR ARTIFICIAL OPENING ENDOSCOPIC, DIAGNOSTIC: ICD-10-PCS | Performed by: INTERNAL MEDICINE

## 2019-09-27 PROCEDURE — 80048 BASIC METABOLIC PNL TOTAL CA: CPT | Performed by: NURSE PRACTITIONER

## 2019-09-27 RX ORDER — PROMETHAZINE HYDROCHLORIDE 25 MG/ML
12.5 INJECTION, SOLUTION INTRAMUSCULAR; INTRAVENOUS ONCE AS NEEDED
Status: DISCONTINUED | OUTPATIENT
Start: 2019-09-27 | End: 2019-09-27 | Stop reason: HOSPADM

## 2019-09-27 RX ORDER — ACETAMINOPHEN 160 MG/5ML
650 SOLUTION ORAL EVERY 4 HOURS PRN
Status: DISCONTINUED | OUTPATIENT
Start: 2019-09-27 | End: 2019-10-01 | Stop reason: HOSPADM

## 2019-09-27 RX ORDER — LEVOTHYROXINE SODIUM 0.15 MG/1
150 TABLET ORAL DAILY
Status: DISCONTINUED | OUTPATIENT
Start: 2019-09-27 | End: 2019-10-01

## 2019-09-27 RX ORDER — PROPOFOL 10 MG/ML
VIAL (ML) INTRAVENOUS AS NEEDED
Status: DISCONTINUED | OUTPATIENT
Start: 2019-09-27 | End: 2019-09-27 | Stop reason: SURG

## 2019-09-27 RX ORDER — PROMETHAZINE HYDROCHLORIDE 25 MG/1
25 SUPPOSITORY RECTAL ONCE AS NEEDED
Status: DISCONTINUED | OUTPATIENT
Start: 2019-09-27 | End: 2019-09-27 | Stop reason: HOSPADM

## 2019-09-27 RX ORDER — NICOTINE POLACRILEX 4 MG
15 LOZENGE BUCCAL
Status: DISCONTINUED | OUTPATIENT
Start: 2019-09-27 | End: 2019-10-01 | Stop reason: HOSPADM

## 2019-09-27 RX ORDER — PROPOFOL 10 MG/ML
VIAL (ML) INTRAVENOUS CONTINUOUS PRN
Status: DISCONTINUED | OUTPATIENT
Start: 2019-09-27 | End: 2019-09-27 | Stop reason: SURG

## 2019-09-27 RX ORDER — SODIUM CHLORIDE 0.9 % (FLUSH) 0.9 %
10 SYRINGE (ML) INJECTION AS NEEDED
Status: DISCONTINUED | OUTPATIENT
Start: 2019-09-27 | End: 2019-10-01 | Stop reason: HOSPADM

## 2019-09-27 RX ORDER — PROMETHAZINE HYDROCHLORIDE 25 MG/1
25 TABLET ORAL ONCE AS NEEDED
Status: DISCONTINUED | OUTPATIENT
Start: 2019-09-27 | End: 2019-09-27 | Stop reason: HOSPADM

## 2019-09-27 RX ORDER — BUDESONIDE AND FORMOTEROL FUMARATE DIHYDRATE 160; 4.5 UG/1; UG/1
2 AEROSOL RESPIRATORY (INHALATION)
Status: DISCONTINUED | OUTPATIENT
Start: 2019-09-27 | End: 2019-10-01 | Stop reason: HOSPADM

## 2019-09-27 RX ORDER — ESCITALOPRAM OXALATE 20 MG/1
20 TABLET ORAL DAILY
Status: DISCONTINUED | OUTPATIENT
Start: 2019-09-27 | End: 2019-10-01 | Stop reason: HOSPADM

## 2019-09-27 RX ORDER — MORPHINE SULFATE 2 MG/ML
2 INJECTION, SOLUTION INTRAMUSCULAR; INTRAVENOUS EVERY 4 HOURS PRN
Status: DISCONTINUED | OUTPATIENT
Start: 2019-09-27 | End: 2019-09-29

## 2019-09-27 RX ORDER — SODIUM CHLORIDE, SODIUM LACTATE, POTASSIUM CHLORIDE, CALCIUM CHLORIDE 600; 310; 30; 20 MG/100ML; MG/100ML; MG/100ML; MG/100ML
30 INJECTION, SOLUTION INTRAVENOUS CONTINUOUS
Status: DISCONTINUED | OUTPATIENT
Start: 2019-09-27 | End: 2019-09-29

## 2019-09-27 RX ORDER — DEXTROSE MONOHYDRATE 25 G/50ML
25 INJECTION, SOLUTION INTRAVENOUS
Status: DISCONTINUED | OUTPATIENT
Start: 2019-09-27 | End: 2019-10-01 | Stop reason: HOSPADM

## 2019-09-27 RX ORDER — BUSPIRONE HYDROCHLORIDE 10 MG/1
10 TABLET ORAL 2 TIMES DAILY
Status: DISCONTINUED | OUTPATIENT
Start: 2019-09-27 | End: 2019-10-01 | Stop reason: HOSPADM

## 2019-09-27 RX ORDER — PANTOPRAZOLE SODIUM 40 MG/10ML
40 INJECTION, POWDER, LYOPHILIZED, FOR SOLUTION INTRAVENOUS EVERY 12 HOURS SCHEDULED
Status: DISCONTINUED | OUTPATIENT
Start: 2019-09-27 | End: 2019-09-28

## 2019-09-27 RX ORDER — OXYBUTYNIN CHLORIDE 5 MG/1
5 TABLET, EXTENDED RELEASE ORAL DAILY
Status: DISCONTINUED | OUTPATIENT
Start: 2019-09-27 | End: 2019-10-01 | Stop reason: HOSPADM

## 2019-09-27 RX ORDER — ONDANSETRON 2 MG/ML
4 INJECTION INTRAMUSCULAR; INTRAVENOUS EVERY 6 HOURS PRN
Status: DISCONTINUED | OUTPATIENT
Start: 2019-09-27 | End: 2019-09-29

## 2019-09-27 RX ORDER — SODIUM CHLORIDE, SODIUM LACTATE, POTASSIUM CHLORIDE, CALCIUM CHLORIDE 600; 310; 30; 20 MG/100ML; MG/100ML; MG/100ML; MG/100ML
100 INJECTION, SOLUTION INTRAVENOUS CONTINUOUS
Status: DISCONTINUED | OUTPATIENT
Start: 2019-09-27 | End: 2019-10-01 | Stop reason: HOSPADM

## 2019-09-27 RX ORDER — ACETAMINOPHEN 650 MG/1
650 SUPPOSITORY RECTAL EVERY 4 HOURS PRN
Status: DISCONTINUED | OUTPATIENT
Start: 2019-09-27 | End: 2019-10-01 | Stop reason: HOSPADM

## 2019-09-27 RX ORDER — PROMETHAZINE HYDROCHLORIDE 25 MG/ML
12.5 INJECTION, SOLUTION INTRAMUSCULAR; INTRAVENOUS EVERY 6 HOURS PRN
Status: DISCONTINUED | OUTPATIENT
Start: 2019-09-27 | End: 2019-10-01 | Stop reason: HOSPADM

## 2019-09-27 RX ORDER — SODIUM CHLORIDE 0.9 % (FLUSH) 0.9 %
10 SYRINGE (ML) INJECTION EVERY 12 HOURS SCHEDULED
Status: DISCONTINUED | OUTPATIENT
Start: 2019-09-27 | End: 2019-10-01 | Stop reason: HOSPADM

## 2019-09-27 RX ORDER — ACETAMINOPHEN 325 MG/1
650 TABLET ORAL EVERY 4 HOURS PRN
Status: DISCONTINUED | OUTPATIENT
Start: 2019-09-27 | End: 2019-10-01 | Stop reason: HOSPADM

## 2019-09-27 RX ORDER — PANTOPRAZOLE SODIUM 40 MG/10ML
40 INJECTION, POWDER, LYOPHILIZED, FOR SOLUTION INTRAVENOUS
Status: DISCONTINUED | OUTPATIENT
Start: 2019-09-27 | End: 2019-09-27

## 2019-09-27 RX ORDER — LIDOCAINE HYDROCHLORIDE 20 MG/ML
INJECTION, SOLUTION INFILTRATION; PERINEURAL AS NEEDED
Status: DISCONTINUED | OUTPATIENT
Start: 2019-09-27 | End: 2019-09-27 | Stop reason: SURG

## 2019-09-27 RX ORDER — SUCRALFATE ORAL 1 G/10ML
1 SUSPENSION ORAL
Status: DISCONTINUED | OUTPATIENT
Start: 2019-09-27 | End: 2019-10-01 | Stop reason: HOSPADM

## 2019-09-27 RX ORDER — ATORVASTATIN CALCIUM 20 MG/1
20 TABLET, FILM COATED ORAL DAILY
Status: DISCONTINUED | OUTPATIENT
Start: 2019-09-27 | End: 2019-10-01 | Stop reason: HOSPADM

## 2019-09-27 RX ORDER — SODIUM CHLORIDE 9 MG/ML
30 INJECTION, SOLUTION INTRAVENOUS CONTINUOUS PRN
Status: DISCONTINUED | OUTPATIENT
Start: 2019-09-27 | End: 2019-10-01 | Stop reason: HOSPADM

## 2019-09-27 RX ADMIN — ONDANSETRON 4 MG: 2 INJECTION INTRAMUSCULAR; INTRAVENOUS at 18:42

## 2019-09-27 RX ADMIN — SUCRALFATE 1 G: 1 SUSPENSION ORAL at 16:42

## 2019-09-27 RX ADMIN — SODIUM CHLORIDE 30 ML/HR: 9 INJECTION, SOLUTION INTRAVENOUS at 13:09

## 2019-09-27 RX ADMIN — BUSPIRONE HYDROCHLORIDE 10 MG: 10 TABLET ORAL at 21:09

## 2019-09-27 RX ADMIN — SODIUM CHLORIDE, POTASSIUM CHLORIDE, SODIUM LACTATE AND CALCIUM CHLORIDE 125 ML/HR: 600; 310; 30; 20 INJECTION, SOLUTION INTRAVENOUS at 10:25

## 2019-09-27 RX ADMIN — MORPHINE SULFATE 2 MG: 2 INJECTION, SOLUTION INTRAMUSCULAR; INTRAVENOUS at 19:18

## 2019-09-27 RX ADMIN — SUCRALFATE 1 G: 1 SUSPENSION ORAL at 21:09

## 2019-09-27 RX ADMIN — PROMETHAZINE HYDROCHLORIDE 12.5 MG: 25 INJECTION INTRAMUSCULAR; INTRAVENOUS at 21:09

## 2019-09-27 RX ADMIN — PANTOPRAZOLE SODIUM 40 MG: 40 INJECTION, POWDER, FOR SOLUTION INTRAVENOUS at 21:09

## 2019-09-27 RX ADMIN — LIDOCAINE HYDROCHLORIDE 50 MG: 20 INJECTION, SOLUTION INFILTRATION; PERINEURAL at 15:30

## 2019-09-27 RX ADMIN — SODIUM CHLORIDE, PRESERVATIVE FREE 10 ML: 5 INJECTION INTRAVENOUS at 13:09

## 2019-09-27 RX ADMIN — ACETAMINOPHEN 650 MG: 325 TABLET, FILM COATED ORAL at 18:42

## 2019-09-27 RX ADMIN — PROPOFOL 100 MG: 10 INJECTION, EMULSION INTRAVENOUS at 15:30

## 2019-09-27 RX ADMIN — PROPOFOL 300 MCG/KG/MIN: 10 INJECTION, EMULSION INTRAVENOUS at 15:31

## 2019-09-27 RX ADMIN — INSULIN LISPRO 2 UNITS: 100 INJECTION, SOLUTION INTRAVENOUS; SUBCUTANEOUS at 21:09

## 2019-09-27 NOTE — ANESTHESIA PREPROCEDURE EVALUATION
Anesthesia Evaluation     Patient summary reviewed and Nursing notes reviewed   history of anesthetic complications:  NPO Solid Status: > 8 hours  NPO Liquid Status: > 8 hours           Airway   Mallampati: IV  TM distance: >3 FB  Neck ROM: limited  Difficult intubation highly probable  Dental - normal exam     Pulmonary - normal exam    breath sounds clear to auscultation  (+) asthma,   Cardiovascular     Rhythm: regular  Rate: normal    (+) murmur, hyperlipidemia,       Neuro/Psych  (+) psychiatric history Anxiety,     GI/Hepatic/Renal/Endo    (+)   diabetes mellitus type 2, hypothyroidism,     Musculoskeletal (-) negative ROS    Abdominal    Substance History - negative use     OB/GYN negative ob/gyn ROS         Other - negative ROS                       Anesthesia Plan    ASA 2     MAC     intravenous induction   Anesthetic plan, all risks, benefits, and alternatives have been provided, discussed and informed consent has been obtained with: patient.

## 2019-09-27 NOTE — ANESTHESIA POSTPROCEDURE EVALUATION
Patient: Eunice Ennis    Procedure Summary     Date:  09/27/19 Room / [facility] /  [facility]    Anesthesia Start:  1529 Anesthesia Stop:  1546    Procedure:  ESOPHAGOGASTRODUODENOSCOPY WITH BIOPSIES (N/A Esophagus) Diagnosis:       Esophagitis      Intractable vomiting with nausea, unspecified vomiting type      (Esophagitis [K20.9])      (Intractable vomiting with nausea, unspecified vomiting type [R11.2])    Surgeon:  Rony Barakat MD Provider:  Shoaib Munguia MD    Anesthesia Type:  MAC ASA Status:  2          Anesthesia Type: MAC  Last vitals  BP   (!) 189/91 (09/27/19 1627)   Temp   36.8 °C (98.3 °F) (09/27/19 1627)   Pulse   78 (09/27/19 1627)   Resp   16 (09/27/19 1627)     SpO2   97 % (09/27/19 1627)     Post Anesthesia Care and Evaluation    Level of consciousness: awake  Pain management: adequate  Airway patency: patent  Anesthetic complications: No anesthetic complications    Cardiovascular status: acceptable  Respiratory status: acceptable  Hydration status: acceptable    Comments: BP (!) 189/91 (BP Location: Left arm, Patient Position: Lying)   Pulse 78   Temp 36.8 °C (98.3 °F) (Oral)   Resp 16   SpO2 97%

## 2019-09-28 ENCOUNTER — APPOINTMENT (OUTPATIENT)
Dept: NUCLEAR MEDICINE | Facility: HOSPITAL | Age: 59
End: 2019-09-28

## 2019-09-28 LAB
ANION GAP SERPL CALCULATED.3IONS-SCNC: 11.8 MMOL/L (ref 5–15)
BASOPHILS # BLD AUTO: 0.03 10*3/MM3 (ref 0–0.2)
BASOPHILS NFR BLD AUTO: 0.4 % (ref 0–1.5)
BUN BLD-MCNC: 12 MG/DL (ref 6–20)
BUN/CREAT SERPL: 20.3 (ref 7–25)
CALCIUM SPEC-SCNC: 8.1 MG/DL (ref 8.6–10.5)
CHLORIDE SERPL-SCNC: 99 MMOL/L (ref 98–107)
CO2 SERPL-SCNC: 28.2 MMOL/L (ref 22–29)
CREAT BLD-MCNC: 0.59 MG/DL (ref 0.57–1)
DEPRECATED RDW RBC AUTO: 37.9 FL (ref 37–54)
EOSINOPHIL # BLD AUTO: 0.06 10*3/MM3 (ref 0–0.4)
EOSINOPHIL NFR BLD AUTO: 0.8 % (ref 0.3–6.2)
ERYTHROCYTE [DISTWIDTH] IN BLOOD BY AUTOMATED COUNT: 12 % (ref 12.3–15.4)
GFR SERPL CREATININE-BSD FRML MDRD: 105 ML/MIN/1.73
GLUCOSE BLD-MCNC: 132 MG/DL (ref 65–99)
GLUCOSE BLDC GLUCOMTR-MCNC: 105 MG/DL (ref 70–130)
GLUCOSE BLDC GLUCOMTR-MCNC: 123 MG/DL (ref 70–130)
GLUCOSE BLDC GLUCOMTR-MCNC: 124 MG/DL (ref 70–130)
GLUCOSE BLDC GLUCOMTR-MCNC: 127 MG/DL (ref 70–130)
HBA1C MFR BLD: 8 % (ref 4.8–5.6)
HCT VFR BLD AUTO: 41.3 % (ref 34–46.6)
HGB BLD-MCNC: 14 G/DL (ref 12–15.9)
IMM GRANULOCYTES # BLD AUTO: 0.03 10*3/MM3 (ref 0–0.05)
IMM GRANULOCYTES NFR BLD AUTO: 0.4 % (ref 0–0.5)
LYMPHOCYTES # BLD AUTO: 2.42 10*3/MM3 (ref 0.7–3.1)
LYMPHOCYTES NFR BLD AUTO: 31.8 % (ref 19.6–45.3)
MCH RBC QN AUTO: 29.4 PG (ref 26.6–33)
MCHC RBC AUTO-ENTMCNC: 33.9 G/DL (ref 31.5–35.7)
MCV RBC AUTO: 86.6 FL (ref 79–97)
MONOCYTES # BLD AUTO: 0.81 10*3/MM3 (ref 0.1–0.9)
MONOCYTES NFR BLD AUTO: 10.6 % (ref 5–12)
NEUTROPHILS # BLD AUTO: 4.27 10*3/MM3 (ref 1.7–7)
NEUTROPHILS NFR BLD AUTO: 56 % (ref 42.7–76)
NRBC BLD AUTO-RTO: 0 /100 WBC (ref 0–0.2)
PLATELET # BLD AUTO: 210 10*3/MM3 (ref 140–450)
PMV BLD AUTO: 9.4 FL (ref 6–12)
POTASSIUM BLD-SCNC: 3.3 MMOL/L (ref 3.5–5.2)
RBC # BLD AUTO: 4.77 10*6/MM3 (ref 3.77–5.28)
SODIUM BLD-SCNC: 139 MMOL/L (ref 136–145)
WBC NRBC COR # BLD: 7.62 10*3/MM3 (ref 3.4–10.8)

## 2019-09-28 PROCEDURE — 78227 HEPATOBIL SYST IMAGE W/DRUG: CPT

## 2019-09-28 PROCEDURE — G0378 HOSPITAL OBSERVATION PER HR: HCPCS

## 2019-09-28 PROCEDURE — A9537 TC99M MEBROFENIN: HCPCS | Performed by: INTERNAL MEDICINE

## 2019-09-28 PROCEDURE — 25010000002 PROMETHAZINE PER 50 MG: Performed by: NURSE PRACTITIONER

## 2019-09-28 PROCEDURE — 82962 GLUCOSE BLOOD TEST: CPT

## 2019-09-28 PROCEDURE — 99232 SBSQ HOSP IP/OBS MODERATE 35: CPT | Performed by: INTERNAL MEDICINE

## 2019-09-28 PROCEDURE — 25010000002 MORPHINE PER 10 MG: Performed by: HOSPITALIST

## 2019-09-28 PROCEDURE — 83036 HEMOGLOBIN GLYCOSYLATED A1C: CPT | Performed by: NURSE PRACTITIONER

## 2019-09-28 PROCEDURE — 25010000002 ONDANSETRON PER 1 MG: Performed by: HOSPITALIST

## 2019-09-28 PROCEDURE — 25010000002 SINCALIDE PER 5 MCG: Performed by: INTERNAL MEDICINE

## 2019-09-28 PROCEDURE — 0 TECHNETIUM TC 99M MEBROFENIN KIT: Performed by: INTERNAL MEDICINE

## 2019-09-28 PROCEDURE — 85025 COMPLETE CBC W/AUTO DIFF WBC: CPT | Performed by: NURSE PRACTITIONER

## 2019-09-28 PROCEDURE — 80048 BASIC METABOLIC PNL TOTAL CA: CPT | Performed by: NURSE PRACTITIONER

## 2019-09-28 RX ORDER — POTASSIUM CHLORIDE 1.5 G/1.77G
40 POWDER, FOR SOLUTION ORAL AS NEEDED
Status: DISCONTINUED | OUTPATIENT
Start: 2019-09-28 | End: 2019-10-01 | Stop reason: HOSPADM

## 2019-09-28 RX ORDER — PANTOPRAZOLE SODIUM 40 MG/1
40 TABLET, DELAYED RELEASE ORAL
Status: DISCONTINUED | OUTPATIENT
Start: 2019-09-28 | End: 2019-10-01 | Stop reason: HOSPADM

## 2019-09-28 RX ORDER — POTASSIUM CHLORIDE 7.45 MG/ML
10 INJECTION INTRAVENOUS
Status: DISCONTINUED | OUTPATIENT
Start: 2019-09-28 | End: 2019-10-01 | Stop reason: HOSPADM

## 2019-09-28 RX ORDER — KIT FOR THE PREPARATION OF TECHNETIUM TC 99M MEBROFENIN 45 MG/10ML
1 INJECTION, POWDER, LYOPHILIZED, FOR SOLUTION INTRAVENOUS
Status: COMPLETED | OUTPATIENT
Start: 2019-09-28 | End: 2019-09-28

## 2019-09-28 RX ORDER — POTASSIUM CHLORIDE 750 MG/1
40 CAPSULE, EXTENDED RELEASE ORAL AS NEEDED
Status: DISCONTINUED | OUTPATIENT
Start: 2019-09-28 | End: 2019-10-01 | Stop reason: HOSPADM

## 2019-09-28 RX ADMIN — PANTOPRAZOLE SODIUM 40 MG: 40 TABLET, DELAYED RELEASE ORAL at 18:50

## 2019-09-28 RX ADMIN — MORPHINE SULFATE 2 MG: 2 INJECTION, SOLUTION INTRAMUSCULAR; INTRAVENOUS at 21:40

## 2019-09-28 RX ADMIN — SODIUM CHLORIDE, PRESERVATIVE FREE 10 ML: 5 INJECTION INTRAVENOUS at 08:36

## 2019-09-28 RX ADMIN — ACETAMINOPHEN 650 MG: 325 TABLET, FILM COATED ORAL at 20:17

## 2019-09-28 RX ADMIN — SUCRALFATE 1 G: 1 SUSPENSION ORAL at 18:51

## 2019-09-28 RX ADMIN — OXYBUTYNIN CHLORIDE 5 MG: 5 TABLET, EXTENDED RELEASE ORAL at 08:36

## 2019-09-28 RX ADMIN — POTASSIUM CHLORIDE 40 MEQ: 750 CAPSULE, EXTENDED RELEASE ORAL at 14:35

## 2019-09-28 RX ADMIN — MEBROFENIN 1 DOSE: 45 INJECTION, POWDER, LYOPHILIZED, FOR SOLUTION INTRAVENOUS at 17:30

## 2019-09-28 RX ADMIN — LEVOTHYROXINE SODIUM 150 MCG: 150 TABLET ORAL at 08:36

## 2019-09-28 RX ADMIN — ONDANSETRON 4 MG: 2 INJECTION INTRAMUSCULAR; INTRAVENOUS at 10:08

## 2019-09-28 RX ADMIN — SINCALIDE 1.9 MCG: 5 INJECTION, POWDER, LYOPHILIZED, FOR SOLUTION INTRAVENOUS at 18:45

## 2019-09-28 RX ADMIN — SUCRALFATE 1 G: 1 SUSPENSION ORAL at 11:50

## 2019-09-28 RX ADMIN — SUCRALFATE 1 G: 1 SUSPENSION ORAL at 21:40

## 2019-09-28 RX ADMIN — ONDANSETRON 4 MG: 2 INJECTION INTRAMUSCULAR; INTRAVENOUS at 20:17

## 2019-09-28 RX ADMIN — ATORVASTATIN CALCIUM 20 MG: 20 TABLET, FILM COATED ORAL at 08:36

## 2019-09-28 RX ADMIN — SODIUM CHLORIDE, PRESERVATIVE FREE 10 ML: 5 INJECTION INTRAVENOUS at 20:18

## 2019-09-28 RX ADMIN — PANTOPRAZOLE SODIUM 40 MG: 40 INJECTION, POWDER, FOR SOLUTION INTRAVENOUS at 08:36

## 2019-09-28 RX ADMIN — PROMETHAZINE HYDROCHLORIDE 12.5 MG: 25 INJECTION INTRAMUSCULAR; INTRAVENOUS at 13:13

## 2019-09-28 RX ADMIN — POTASSIUM CHLORIDE 40 MEQ: 750 CAPSULE, EXTENDED RELEASE ORAL at 18:50

## 2019-09-28 RX ADMIN — BUSPIRONE HYDROCHLORIDE 10 MG: 10 TABLET ORAL at 21:40

## 2019-09-28 RX ADMIN — BUSPIRONE HYDROCHLORIDE 10 MG: 10 TABLET ORAL at 08:36

## 2019-09-28 RX ADMIN — SUCRALFATE 1 G: 1 SUSPENSION ORAL at 06:36

## 2019-09-28 RX ADMIN — ESCITALOPRAM 20 MG: 20 TABLET, FILM COATED ORAL at 08:36

## 2019-09-29 PROBLEM — K82.8 BILIARY DYSKINESIA: Status: ACTIVE | Noted: 2019-09-27

## 2019-09-29 LAB
ALBUMIN SERPL-MCNC: 3.6 G/DL (ref 3.5–5.2)
ALBUMIN/GLOB SERPL: 1.4 G/DL
ALP SERPL-CCNC: 59 U/L (ref 39–117)
ALT SERPL W P-5'-P-CCNC: 10 U/L (ref 1–33)
ANION GAP SERPL CALCULATED.3IONS-SCNC: 12.5 MMOL/L (ref 5–15)
AST SERPL-CCNC: 15 U/L (ref 1–32)
BILIRUB SERPL-MCNC: 0.5 MG/DL (ref 0.2–1.2)
BUN BLD-MCNC: 13 MG/DL (ref 6–20)
BUN/CREAT SERPL: 22.8 (ref 7–25)
CALCIUM SPEC-SCNC: 8.4 MG/DL (ref 8.6–10.5)
CHLORIDE SERPL-SCNC: 98 MMOL/L (ref 98–107)
CO2 SERPL-SCNC: 26.5 MMOL/L (ref 22–29)
CREAT BLD-MCNC: 0.57 MG/DL (ref 0.57–1)
DEPRECATED RDW RBC AUTO: 41.6 FL (ref 37–54)
ERYTHROCYTE [DISTWIDTH] IN BLOOD BY AUTOMATED COUNT: 12.4 % (ref 12.3–15.4)
GFR SERPL CREATININE-BSD FRML MDRD: 110 ML/MIN/1.73
GLOBULIN UR ELPH-MCNC: 2.6 GM/DL
GLUCOSE BLD-MCNC: 127 MG/DL (ref 65–99)
GLUCOSE BLDC GLUCOMTR-MCNC: 109 MG/DL (ref 70–130)
GLUCOSE BLDC GLUCOMTR-MCNC: 123 MG/DL (ref 70–130)
GLUCOSE BLDC GLUCOMTR-MCNC: 140 MG/DL (ref 70–130)
GLUCOSE BLDC GLUCOMTR-MCNC: 92 MG/DL (ref 70–130)
HCT VFR BLD AUTO: 46 % (ref 34–46.6)
HGB BLD-MCNC: 14.6 G/DL (ref 12–15.9)
MCH RBC QN AUTO: 28.3 PG (ref 26.6–33)
MCHC RBC AUTO-ENTMCNC: 31.7 G/DL (ref 31.5–35.7)
MCV RBC AUTO: 89.1 FL (ref 79–97)
PLATELET # BLD AUTO: 225 10*3/MM3 (ref 140–450)
PMV BLD AUTO: 9.1 FL (ref 6–12)
POTASSIUM BLD-SCNC: 4.3 MMOL/L (ref 3.5–5.2)
PROT SERPL-MCNC: 6.2 G/DL (ref 6–8.5)
RBC # BLD AUTO: 5.16 10*6/MM3 (ref 3.77–5.28)
SODIUM BLD-SCNC: 137 MMOL/L (ref 136–145)
WBC NRBC COR # BLD: 8.19 10*3/MM3 (ref 3.4–10.8)

## 2019-09-29 PROCEDURE — G0378 HOSPITAL OBSERVATION PER HR: HCPCS

## 2019-09-29 PROCEDURE — 99232 SBSQ HOSP IP/OBS MODERATE 35: CPT | Performed by: INTERNAL MEDICINE

## 2019-09-29 PROCEDURE — 80053 COMPREHEN METABOLIC PANEL: CPT | Performed by: INTERNAL MEDICINE

## 2019-09-29 PROCEDURE — 85027 COMPLETE CBC AUTOMATED: CPT | Performed by: INTERNAL MEDICINE

## 2019-09-29 PROCEDURE — 99243 OFF/OP CNSLTJ NEW/EST LOW 30: CPT | Performed by: SURGERY

## 2019-09-29 PROCEDURE — 25010000002 PROMETHAZINE PER 50 MG: Performed by: NURSE PRACTITIONER

## 2019-09-29 PROCEDURE — 82962 GLUCOSE BLOOD TEST: CPT

## 2019-09-29 RX ORDER — MORPHINE SULFATE 2 MG/ML
2 INJECTION, SOLUTION INTRAMUSCULAR; INTRAVENOUS EVERY 4 HOURS PRN
Status: DISCONTINUED | OUTPATIENT
Start: 2019-09-29 | End: 2019-10-01 | Stop reason: HOSPADM

## 2019-09-29 RX ORDER — ONDANSETRON 4 MG/1
4 TABLET, FILM COATED ORAL EVERY 6 HOURS PRN
Status: DISCONTINUED | OUTPATIENT
Start: 2019-09-29 | End: 2019-10-01 | Stop reason: HOSPADM

## 2019-09-29 RX ORDER — ONDANSETRON 2 MG/ML
4 INJECTION INTRAMUSCULAR; INTRAVENOUS EVERY 6 HOURS PRN
Status: DISCONTINUED | OUTPATIENT
Start: 2019-09-29 | End: 2019-10-01 | Stop reason: HOSPADM

## 2019-09-29 RX ORDER — HYDROCODONE BITARTRATE AND ACETAMINOPHEN 5; 325 MG/1; MG/1
1 TABLET ORAL EVERY 6 HOURS PRN
Status: DISCONTINUED | OUTPATIENT
Start: 2019-09-29 | End: 2019-10-01 | Stop reason: HOSPADM

## 2019-09-29 RX ADMIN — LEVOTHYROXINE SODIUM 150 MCG: 150 TABLET ORAL at 08:32

## 2019-09-29 RX ADMIN — SODIUM CHLORIDE, PRESERVATIVE FREE 10 ML: 5 INJECTION INTRAVENOUS at 08:32

## 2019-09-29 RX ADMIN — BUSPIRONE HYDROCHLORIDE 10 MG: 10 TABLET ORAL at 21:17

## 2019-09-29 RX ADMIN — SUCRALFATE 1 G: 1 SUSPENSION ORAL at 21:17

## 2019-09-29 RX ADMIN — OXYBUTYNIN CHLORIDE 5 MG: 5 TABLET, EXTENDED RELEASE ORAL at 08:32

## 2019-09-29 RX ADMIN — ESCITALOPRAM 20 MG: 20 TABLET, FILM COATED ORAL at 08:32

## 2019-09-29 RX ADMIN — BUSPIRONE HYDROCHLORIDE 10 MG: 10 TABLET ORAL at 08:32

## 2019-09-29 RX ADMIN — PANTOPRAZOLE SODIUM 40 MG: 40 TABLET, DELAYED RELEASE ORAL at 06:37

## 2019-09-29 RX ADMIN — SUCRALFATE 1 G: 1 SUSPENSION ORAL at 17:06

## 2019-09-29 RX ADMIN — PROMETHAZINE HYDROCHLORIDE 12.5 MG: 25 INJECTION INTRAMUSCULAR; INTRAVENOUS at 23:31

## 2019-09-29 RX ADMIN — SUCRALFATE 1 G: 1 SUSPENSION ORAL at 12:00

## 2019-09-29 RX ADMIN — ATORVASTATIN CALCIUM 20 MG: 20 TABLET, FILM COATED ORAL at 08:32

## 2019-09-29 RX ADMIN — SUCRALFATE 1 G: 1 SUSPENSION ORAL at 06:38

## 2019-09-29 RX ADMIN — PANTOPRAZOLE SODIUM 40 MG: 40 TABLET, DELAYED RELEASE ORAL at 17:06

## 2019-09-30 ENCOUNTER — APPOINTMENT (OUTPATIENT)
Dept: GENERAL RADIOLOGY | Facility: HOSPITAL | Age: 59
End: 2019-09-30

## 2019-09-30 ENCOUNTER — ANESTHESIA (OUTPATIENT)
Dept: PERIOP | Facility: HOSPITAL | Age: 59
End: 2019-09-30

## 2019-09-30 ENCOUNTER — ANESTHESIA EVENT (OUTPATIENT)
Dept: PERIOP | Facility: HOSPITAL | Age: 59
End: 2019-09-30

## 2019-09-30 LAB
ANION GAP SERPL CALCULATED.3IONS-SCNC: 13 MMOL/L (ref 5–15)
BUN BLD-MCNC: 11 MG/DL (ref 6–20)
BUN/CREAT SERPL: 19.3 (ref 7–25)
CALCIUM SPEC-SCNC: 8 MG/DL (ref 8.6–10.5)
CHLORIDE SERPL-SCNC: 102 MMOL/L (ref 98–107)
CO2 SERPL-SCNC: 27 MMOL/L (ref 22–29)
CREAT BLD-MCNC: 0.57 MG/DL (ref 0.57–1)
CYTO UR: NORMAL
DEPRECATED RDW RBC AUTO: 38.6 FL (ref 37–54)
ERYTHROCYTE [DISTWIDTH] IN BLOOD BY AUTOMATED COUNT: 12 % (ref 12.3–15.4)
GFR SERPL CREATININE-BSD FRML MDRD: 110 ML/MIN/1.73
GLUCOSE BLD-MCNC: 99 MG/DL (ref 65–99)
GLUCOSE BLDC GLUCOMTR-MCNC: 105 MG/DL (ref 70–130)
GLUCOSE BLDC GLUCOMTR-MCNC: 130 MG/DL (ref 70–130)
GLUCOSE BLDC GLUCOMTR-MCNC: 137 MG/DL (ref 70–130)
GLUCOSE BLDC GLUCOMTR-MCNC: 195 MG/DL (ref 70–130)
HCT VFR BLD AUTO: 39.3 % (ref 34–46.6)
HGB BLD-MCNC: 13.7 G/DL (ref 12–15.9)
LAB AP CASE REPORT: NORMAL
MCH RBC QN AUTO: 30.5 PG (ref 26.6–33)
MCHC RBC AUTO-ENTMCNC: 34.9 G/DL (ref 31.5–35.7)
MCV RBC AUTO: 87.5 FL (ref 79–97)
PATH REPORT.FINAL DX SPEC: NORMAL
PATH REPORT.GROSS SPEC: NORMAL
PLATELET # BLD AUTO: 207 10*3/MM3 (ref 140–450)
PMV BLD AUTO: 9.1 FL (ref 6–12)
POTASSIUM BLD-SCNC: 3.5 MMOL/L (ref 3.5–5.2)
RBC # BLD AUTO: 4.49 10*6/MM3 (ref 3.77–5.28)
SODIUM BLD-SCNC: 142 MMOL/L (ref 136–145)
WBC NRBC COR # BLD: 8.2 10*3/MM3 (ref 3.4–10.8)

## 2019-09-30 PROCEDURE — 0FT44ZZ RESECTION OF GALLBLADDER, PERCUTANEOUS ENDOSCOPIC APPROACH: ICD-10-PCS | Performed by: SURGERY

## 2019-09-30 PROCEDURE — 82962 GLUCOSE BLOOD TEST: CPT

## 2019-09-30 PROCEDURE — 25010000002 ONDANSETRON PER 1 MG: Performed by: NURSE ANESTHETIST, CERTIFIED REGISTERED

## 2019-09-30 PROCEDURE — 25010000002 FENTANYL CITRATE (PF) 100 MCG/2ML SOLUTION: Performed by: NURSE ANESTHETIST, CERTIFIED REGISTERED

## 2019-09-30 PROCEDURE — 80048 BASIC METABOLIC PNL TOTAL CA: CPT | Performed by: INTERNAL MEDICINE

## 2019-09-30 PROCEDURE — G0378 HOSPITAL OBSERVATION PER HR: HCPCS

## 2019-09-30 PROCEDURE — 99232 SBSQ HOSP IP/OBS MODERATE 35: CPT | Performed by: INTERNAL MEDICINE

## 2019-09-30 PROCEDURE — 25010000002 PROPOFOL 10 MG/ML EMULSION: Performed by: NURSE ANESTHETIST, CERTIFIED REGISTERED

## 2019-09-30 PROCEDURE — 88304 TISSUE EXAM BY PATHOLOGIST: CPT | Performed by: SURGERY

## 2019-09-30 PROCEDURE — 25010000002 HYDROMORPHONE PER 4 MG: Performed by: NURSE ANESTHETIST, CERTIFIED REGISTERED

## 2019-09-30 PROCEDURE — 25010000002 MORPHINE PER 10 MG: Performed by: SURGERY

## 2019-09-30 PROCEDURE — 25010000002 MIDAZOLAM PER 1 MG: Performed by: ANESTHESIOLOGY

## 2019-09-30 PROCEDURE — 0 IOTHALAMATE 60 % SOLUTION: Performed by: SURGERY

## 2019-09-30 PROCEDURE — 74300 X-RAY BILE DUCTS/PANCREAS: CPT

## 2019-09-30 PROCEDURE — 47563 LAPARO CHOLECYSTECTOMY/GRAPH: CPT | Performed by: SURGERY

## 2019-09-30 PROCEDURE — 63710000001 INSULIN LISPRO (HUMAN) PER 5 UNITS: Performed by: NURSE PRACTITIONER

## 2019-09-30 PROCEDURE — BF101ZZ FLUOROSCOPY OF BILE DUCTS USING LOW OSMOLAR CONTRAST: ICD-10-PCS | Performed by: SURGERY

## 2019-09-30 PROCEDURE — 85027 COMPLETE CBC AUTOMATED: CPT | Performed by: INTERNAL MEDICINE

## 2019-09-30 PROCEDURE — 25010000003 POTASSIUM CHLORIDE 10 MEQ/100ML SOLUTION: Performed by: INTERNAL MEDICINE

## 2019-09-30 PROCEDURE — 25010000002 NEOSTIGMINE PER 0.5 MG: Performed by: NURSE ANESTHETIST, CERTIFIED REGISTERED

## 2019-09-30 DEVICE — CLIP LIGAT VASC HORIZON TI MD/LG GRN 6CT: Type: IMPLANTABLE DEVICE | Site: ABDOMEN | Status: FUNCTIONAL

## 2019-09-30 RX ORDER — MIDAZOLAM HYDROCHLORIDE 1 MG/ML
2 INJECTION INTRAMUSCULAR; INTRAVENOUS
Status: DISCONTINUED | OUTPATIENT
Start: 2019-09-30 | End: 2019-09-30 | Stop reason: HOSPADM

## 2019-09-30 RX ORDER — PROMETHAZINE HYDROCHLORIDE 25 MG/1
25 TABLET ORAL ONCE AS NEEDED
Status: DISCONTINUED | OUTPATIENT
Start: 2019-09-30 | End: 2019-09-30 | Stop reason: HOSPADM

## 2019-09-30 RX ORDER — FLUMAZENIL 0.1 MG/ML
0.2 INJECTION INTRAVENOUS AS NEEDED
Status: DISCONTINUED | OUTPATIENT
Start: 2019-09-30 | End: 2019-09-30 | Stop reason: HOSPADM

## 2019-09-30 RX ORDER — LIDOCAINE HYDROCHLORIDE 20 MG/ML
INJECTION, SOLUTION INFILTRATION; PERINEURAL AS NEEDED
Status: DISCONTINUED | OUTPATIENT
Start: 2019-09-30 | End: 2019-09-30 | Stop reason: SURG

## 2019-09-30 RX ORDER — MIDAZOLAM HYDROCHLORIDE 1 MG/ML
1 INJECTION INTRAMUSCULAR; INTRAVENOUS
Status: DISCONTINUED | OUTPATIENT
Start: 2019-09-30 | End: 2019-09-30 | Stop reason: HOSPADM

## 2019-09-30 RX ORDER — FAMOTIDINE 10 MG/ML
20 INJECTION, SOLUTION INTRAVENOUS ONCE
Status: COMPLETED | OUTPATIENT
Start: 2019-09-30 | End: 2019-09-30

## 2019-09-30 RX ORDER — HYDRALAZINE HYDROCHLORIDE 20 MG/ML
5 INJECTION INTRAMUSCULAR; INTRAVENOUS
Status: DISCONTINUED | OUTPATIENT
Start: 2019-09-30 | End: 2019-09-30 | Stop reason: HOSPADM

## 2019-09-30 RX ORDER — DIPHENHYDRAMINE HYDROCHLORIDE 50 MG/ML
12.5 INJECTION INTRAMUSCULAR; INTRAVENOUS
Status: DISCONTINUED | OUTPATIENT
Start: 2019-09-30 | End: 2019-09-30 | Stop reason: HOSPADM

## 2019-09-30 RX ORDER — BUPIVACAINE HYDROCHLORIDE AND EPINEPHRINE 2.5; 5 MG/ML; UG/ML
INJECTION, SOLUTION INFILTRATION; PERINEURAL AS NEEDED
Status: DISCONTINUED | OUTPATIENT
Start: 2019-09-30 | End: 2019-09-30 | Stop reason: HOSPADM

## 2019-09-30 RX ORDER — FENTANYL CITRATE 50 UG/ML
INJECTION, SOLUTION INTRAMUSCULAR; INTRAVENOUS AS NEEDED
Status: DISCONTINUED | OUTPATIENT
Start: 2019-09-30 | End: 2019-09-30 | Stop reason: SURG

## 2019-09-30 RX ORDER — MAGNESIUM HYDROXIDE 1200 MG/15ML
LIQUID ORAL AS NEEDED
Status: DISCONTINUED | OUTPATIENT
Start: 2019-09-30 | End: 2019-09-30 | Stop reason: HOSPADM

## 2019-09-30 RX ORDER — SODIUM CHLORIDE 9 MG/ML
INJECTION, SOLUTION INTRAVENOUS AS NEEDED
Status: DISCONTINUED | OUTPATIENT
Start: 2019-09-30 | End: 2019-09-30 | Stop reason: HOSPADM

## 2019-09-30 RX ORDER — ACETAMINOPHEN 325 MG/1
650 TABLET ORAL ONCE AS NEEDED
Status: DISCONTINUED | OUTPATIENT
Start: 2019-09-30 | End: 2019-09-30 | Stop reason: HOSPADM

## 2019-09-30 RX ORDER — GLYCOPYRROLATE 0.2 MG/ML
INJECTION INTRAMUSCULAR; INTRAVENOUS AS NEEDED
Status: DISCONTINUED | OUTPATIENT
Start: 2019-09-30 | End: 2019-09-30 | Stop reason: SURG

## 2019-09-30 RX ORDER — SODIUM CHLORIDE 0.9 % (FLUSH) 0.9 %
3-10 SYRINGE (ML) INJECTION AS NEEDED
Status: DISCONTINUED | OUTPATIENT
Start: 2019-09-30 | End: 2019-09-30 | Stop reason: HOSPADM

## 2019-09-30 RX ORDER — PROMETHAZINE HYDROCHLORIDE 25 MG/ML
6.25 INJECTION, SOLUTION INTRAMUSCULAR; INTRAVENOUS
Status: DISCONTINUED | OUTPATIENT
Start: 2019-09-30 | End: 2019-09-30 | Stop reason: HOSPADM

## 2019-09-30 RX ORDER — PROMETHAZINE HYDROCHLORIDE 25 MG/1
25 SUPPOSITORY RECTAL ONCE AS NEEDED
Status: DISCONTINUED | OUTPATIENT
Start: 2019-09-30 | End: 2019-09-30 | Stop reason: HOSPADM

## 2019-09-30 RX ORDER — HYDROCODONE BITARTRATE AND ACETAMINOPHEN 7.5; 325 MG/1; MG/1
1 TABLET ORAL ONCE AS NEEDED
Status: DISCONTINUED | OUTPATIENT
Start: 2019-09-30 | End: 2019-09-30 | Stop reason: HOSPADM

## 2019-09-30 RX ORDER — ROCURONIUM BROMIDE 10 MG/ML
INJECTION, SOLUTION INTRAVENOUS AS NEEDED
Status: DISCONTINUED | OUTPATIENT
Start: 2019-09-30 | End: 2019-09-30 | Stop reason: SURG

## 2019-09-30 RX ORDER — FENTANYL CITRATE 50 UG/ML
50 INJECTION, SOLUTION INTRAMUSCULAR; INTRAVENOUS
Status: DISCONTINUED | OUTPATIENT
Start: 2019-09-30 | End: 2019-09-30 | Stop reason: HOSPADM

## 2019-09-30 RX ORDER — HYDROMORPHONE HYDROCHLORIDE 1 MG/ML
0.5 INJECTION, SOLUTION INTRAMUSCULAR; INTRAVENOUS; SUBCUTANEOUS
Status: DISCONTINUED | OUTPATIENT
Start: 2019-09-30 | End: 2019-09-30 | Stop reason: HOSPADM

## 2019-09-30 RX ORDER — PROMETHAZINE HYDROCHLORIDE 25 MG/ML
12.5 INJECTION, SOLUTION INTRAMUSCULAR; INTRAVENOUS ONCE AS NEEDED
Status: DISCONTINUED | OUTPATIENT
Start: 2019-09-30 | End: 2019-09-30 | Stop reason: HOSPADM

## 2019-09-30 RX ORDER — ONDANSETRON 2 MG/ML
4 INJECTION INTRAMUSCULAR; INTRAVENOUS ONCE AS NEEDED
Status: COMPLETED | OUTPATIENT
Start: 2019-09-30 | End: 2019-09-30

## 2019-09-30 RX ORDER — DIPHENHYDRAMINE HCL 25 MG
25 CAPSULE ORAL
Status: DISCONTINUED | OUTPATIENT
Start: 2019-09-30 | End: 2019-09-30 | Stop reason: HOSPADM

## 2019-09-30 RX ORDER — EPHEDRINE SULFATE 50 MG/ML
INJECTION, SOLUTION INTRAVENOUS AS NEEDED
Status: DISCONTINUED | OUTPATIENT
Start: 2019-09-30 | End: 2019-09-30 | Stop reason: SURG

## 2019-09-30 RX ORDER — MORPHINE SULFATE 2 MG/ML
2 INJECTION, SOLUTION INTRAMUSCULAR; INTRAVENOUS
Status: DISCONTINUED | OUTPATIENT
Start: 2019-09-30 | End: 2019-10-01 | Stop reason: HOSPADM

## 2019-09-30 RX ORDER — NALOXONE HCL 0.4 MG/ML
0.2 VIAL (ML) INJECTION AS NEEDED
Status: DISCONTINUED | OUTPATIENT
Start: 2019-09-30 | End: 2019-09-30 | Stop reason: HOSPADM

## 2019-09-30 RX ORDER — OXYCODONE AND ACETAMINOPHEN 7.5; 325 MG/1; MG/1
1 TABLET ORAL ONCE AS NEEDED
Status: DISCONTINUED | OUTPATIENT
Start: 2019-09-30 | End: 2019-09-30 | Stop reason: HOSPADM

## 2019-09-30 RX ORDER — ONDANSETRON 2 MG/ML
INJECTION INTRAMUSCULAR; INTRAVENOUS AS NEEDED
Status: DISCONTINUED | OUTPATIENT
Start: 2019-09-30 | End: 2019-09-30 | Stop reason: SURG

## 2019-09-30 RX ORDER — SODIUM CHLORIDE 0.9 % (FLUSH) 0.9 %
3 SYRINGE (ML) INJECTION EVERY 12 HOURS SCHEDULED
Status: DISCONTINUED | OUTPATIENT
Start: 2019-09-30 | End: 2019-09-30 | Stop reason: HOSPADM

## 2019-09-30 RX ORDER — LIDOCAINE HYDROCHLORIDE 10 MG/ML
0.5 INJECTION, SOLUTION EPIDURAL; INFILTRATION; INTRACAUDAL; PERINEURAL ONCE AS NEEDED
Status: DISCONTINUED | OUTPATIENT
Start: 2019-09-30 | End: 2019-09-30 | Stop reason: HOSPADM

## 2019-09-30 RX ORDER — EPHEDRINE SULFATE 50 MG/ML
5 INJECTION, SOLUTION INTRAVENOUS ONCE AS NEEDED
Status: DISCONTINUED | OUTPATIENT
Start: 2019-09-30 | End: 2019-09-30 | Stop reason: HOSPADM

## 2019-09-30 RX ORDER — PROPOFOL 10 MG/ML
VIAL (ML) INTRAVENOUS AS NEEDED
Status: DISCONTINUED | OUTPATIENT
Start: 2019-09-30 | End: 2019-09-30 | Stop reason: SURG

## 2019-09-30 RX ORDER — LABETALOL HYDROCHLORIDE 5 MG/ML
5 INJECTION, SOLUTION INTRAVENOUS
Status: DISCONTINUED | OUTPATIENT
Start: 2019-09-30 | End: 2019-09-30 | Stop reason: HOSPADM

## 2019-09-30 RX ORDER — SODIUM CHLORIDE, SODIUM LACTATE, POTASSIUM CHLORIDE, CALCIUM CHLORIDE 600; 310; 30; 20 MG/100ML; MG/100ML; MG/100ML; MG/100ML
9 INJECTION, SOLUTION INTRAVENOUS CONTINUOUS
Status: DISCONTINUED | OUTPATIENT
Start: 2019-09-30 | End: 2019-09-30

## 2019-09-30 RX ADMIN — SUCRALFATE 1 G: 1 SUSPENSION ORAL at 21:26

## 2019-09-30 RX ADMIN — SODIUM CHLORIDE, POTASSIUM CHLORIDE, SODIUM LACTATE AND CALCIUM CHLORIDE 9 ML/HR: 600; 310; 30; 20 INJECTION, SOLUTION INTRAVENOUS at 10:22

## 2019-09-30 RX ADMIN — PROPOFOL 200 MG: 10 INJECTION, EMULSION INTRAVENOUS at 11:15

## 2019-09-30 RX ADMIN — SODIUM CHLORIDE, PRESERVATIVE FREE 10 ML: 5 INJECTION INTRAVENOUS at 21:27

## 2019-09-30 RX ADMIN — PANTOPRAZOLE SODIUM 40 MG: 40 TABLET, DELAYED RELEASE ORAL at 06:37

## 2019-09-30 RX ADMIN — POTASSIUM CHLORIDE 10 MEQ: 7.46 INJECTION, SOLUTION INTRAVENOUS at 05:43

## 2019-09-30 RX ADMIN — POTASSIUM CHLORIDE 10 MEQ: 7.46 INJECTION, SOLUTION INTRAVENOUS at 06:37

## 2019-09-30 RX ADMIN — MIDAZOLAM 2 MG: 1 INJECTION INTRAMUSCULAR; INTRAVENOUS at 10:23

## 2019-09-30 RX ADMIN — SUCRALFATE 1 G: 1 SUSPENSION ORAL at 17:23

## 2019-09-30 RX ADMIN — FAMOTIDINE 20 MG: 10 INJECTION, SOLUTION INTRAVENOUS at 10:23

## 2019-09-30 RX ADMIN — BUSPIRONE HYDROCHLORIDE 10 MG: 10 TABLET ORAL at 21:26

## 2019-09-30 RX ADMIN — BUSPIRONE HYDROCHLORIDE 10 MG: 10 TABLET ORAL at 15:23

## 2019-09-30 RX ADMIN — FENTANYL CITRATE 50 MCG: 50 INJECTION INTRAMUSCULAR; INTRAVENOUS at 13:00

## 2019-09-30 RX ADMIN — HYDROCODONE BITARTRATE AND ACETAMINOPHEN 1 TABLET: 5; 325 TABLET ORAL at 21:26

## 2019-09-30 RX ADMIN — LEVOTHYROXINE SODIUM 150 MCG: 150 TABLET ORAL at 15:23

## 2019-09-30 RX ADMIN — ONDANSETRON 4 MG: 2 INJECTION INTRAMUSCULAR; INTRAVENOUS at 13:04

## 2019-09-30 RX ADMIN — INSULIN LISPRO 2 UNITS: 100 INJECTION, SOLUTION INTRAVENOUS; SUBCUTANEOUS at 21:27

## 2019-09-30 RX ADMIN — FENTANYL CITRATE 50 MCG: 50 INJECTION INTRAMUSCULAR; INTRAVENOUS at 11:40

## 2019-09-30 RX ADMIN — HYDROMORPHONE HYDROCHLORIDE 0.5 MG: 1 INJECTION, SOLUTION INTRAMUSCULAR; INTRAVENOUS; SUBCUTANEOUS at 13:04

## 2019-09-30 RX ADMIN — ROCURONIUM BROMIDE 50 MG: 10 INJECTION INTRAVENOUS at 11:15

## 2019-09-30 RX ADMIN — GLYCOPYRROLATE 0.5 MG: 0.2 INJECTION INTRAMUSCULAR; INTRAVENOUS at 12:02

## 2019-09-30 RX ADMIN — SUCRALFATE 1 G: 1 SUSPENSION ORAL at 06:37

## 2019-09-30 RX ADMIN — ONDANSETRON 4 MG: 2 INJECTION INTRAMUSCULAR; INTRAVENOUS at 11:49

## 2019-09-30 RX ADMIN — SODIUM CHLORIDE, POTASSIUM CHLORIDE, SODIUM LACTATE AND CALCIUM CHLORIDE: 600; 310; 30; 20 INJECTION, SOLUTION INTRAVENOUS at 12:03

## 2019-09-30 RX ADMIN — MORPHINE SULFATE 2 MG: 2 INJECTION, SOLUTION INTRAMUSCULAR; INTRAVENOUS at 17:27

## 2019-09-30 RX ADMIN — POTASSIUM CHLORIDE 10 MEQ: 7.46 INJECTION, SOLUTION INTRAVENOUS at 07:47

## 2019-09-30 RX ADMIN — ESCITALOPRAM 20 MG: 20 TABLET, FILM COATED ORAL at 15:23

## 2019-09-30 RX ADMIN — PROPOFOL 100 MG: 10 INJECTION, EMULSION INTRAVENOUS at 11:44

## 2019-09-30 RX ADMIN — FENTANYL CITRATE 50 MCG: 50 INJECTION INTRAMUSCULAR; INTRAVENOUS at 11:15

## 2019-09-30 RX ADMIN — SODIUM CHLORIDE, POTASSIUM CHLORIDE, SODIUM LACTATE AND CALCIUM CHLORIDE 100 ML/HR: 600; 310; 30; 20 INJECTION, SOLUTION INTRAVENOUS at 21:38

## 2019-09-30 RX ADMIN — HYDROMORPHONE HYDROCHLORIDE 0.5 MG: 1 INJECTION, SOLUTION INTRAMUSCULAR; INTRAVENOUS; SUBCUTANEOUS at 13:33

## 2019-09-30 RX ADMIN — NEOSTIGMINE METHYLSULFATE 3 MG: 1 INJECTION INTRAMUSCULAR; INTRAVENOUS; SUBCUTANEOUS at 12:02

## 2019-09-30 RX ADMIN — PROPOFOL 50 MG: 10 INJECTION, EMULSION INTRAVENOUS at 11:47

## 2019-09-30 RX ADMIN — LIDOCAINE HYDROCHLORIDE 100 MG: 20 INJECTION, SOLUTION INFILTRATION; PERINEURAL at 11:15

## 2019-09-30 RX ADMIN — EPHEDRINE SULFATE 10 MG: 50 INJECTION INTRAMUSCULAR; INTRAVENOUS; SUBCUTANEOUS at 11:25

## 2019-09-30 RX ADMIN — HYDROCODONE BITARTRATE AND ACETAMINOPHEN 1 TABLET: 5; 325 TABLET ORAL at 15:52

## 2019-09-30 RX ADMIN — PANTOPRAZOLE SODIUM 40 MG: 40 TABLET, DELAYED RELEASE ORAL at 17:23

## 2019-09-30 RX ADMIN — OXYBUTYNIN CHLORIDE 5 MG: 5 TABLET, EXTENDED RELEASE ORAL at 15:23

## 2019-09-30 NOTE — ANESTHESIA PREPROCEDURE EVALUATION
Anesthesia Evaluation     Patient summary reviewed and Nursing notes reviewed   history of anesthetic complications:  NPO Solid Status: > 8 hours  NPO Liquid Status: > 8 hours           Airway   Mallampati: IV  TM distance: >3 FB  Neck ROM: limited  Difficult intubation highly probable  Dental      Comment: Rotten lower front teeth/loose. Multiple cracks noted on upper front teeth    Pulmonary - normal exam    breath sounds clear to auscultation  (+) asthma,   Cardiovascular     Rhythm: regular  Rate: normal    (+) murmur, hyperlipidemia,       Neuro/Psych  (+) psychiatric history Anxiety,     GI/Hepatic/Renal/Endo    (+)   diabetes mellitus type 2, hypothyroidism,     Musculoskeletal (-) negative ROS    Abdominal    Substance History - negative use     OB/GYN negative ob/gyn ROS         Other - negative ROS                       Anesthesia Plan    ASA 2     general     intravenous induction   Anesthetic plan, all risks, benefits, and alternatives have been provided, discussed and informed consent has been obtained with: patient.    Plan discussed with CRNA.

## 2019-09-30 NOTE — ANESTHESIA PROCEDURE NOTES
Airway  Urgency: elective    Date/Time: 9/30/2019 11:18 AM  Airway not difficult    General Information and Staff    Patient location during procedure: OR  Anesthesiologist: Gary Callaway MD  CRNA: Yovani Dooley CRNA    Indications and Patient Condition  Indications for airway management: airway protection    Preoxygenated: yes  MILS maintained throughout  Mask difficulty assessment: 2 - vent by mask + OA or adjuvant +/- NMBA    Final Airway Details  Final airway type: endotracheal airway      Successful airway: ETT  Cuffed: yes   Successful intubation technique: direct laryngoscopy  Facilitating devices/methods: intubating stylet  Endotracheal tube insertion site: oral  Blade: Caitlyn  Blade size: 3  ETT size (mm): 7.0  Cormack-Lehane Classification: grade IIa - partial view of glottis  Placement verified by: chest auscultation and capnometry   Cuff volume (mL): 8  Measured from: lips  ETT/EBT  to lips (cm): 21  Number of attempts at approach: 1  Assessment: lips, teeth, and gum same as pre-op and atraumatic intubation    Additional Comments  Patient in OR. Monitors on. BLVS. Multiple rotten teeth. Lower two front teeth rotten/loose. Multiple chips/cracks noted in upper and lower teeth. Pre 02 100%. SIVI. DL x1. DVVC. Atraumatic placement of ETT. Placement verified with ETC02 and BBS. ETT secured. Teeth/lips in pre-op condition.

## 2019-09-30 NOTE — ANESTHESIA POSTPROCEDURE EVALUATION
"Patient: Eunice Ennis    Procedure Summary     Date:  09/30/19 Room / Location:  [facility] OR [address] [place] [address] MAIN OR    Anesthesia Start:  1113 Anesthesia Stop:  1224    Procedure:  CHOLECYSTECTOMY LAPAROSCOPIC INTRAOPERATIVE CHOLANGIOGRAM (N/A Abdomen) Diagnosis:       Biliary dyskinesia      (Biliary dyskinesia [K82.8])    Surgeon:  Jonathan Tiwari Jr., MD Provider:  Gary Callaway MD    Anesthesia Type:  general ASA Status:  2          Anesthesia Type: general  Last vitals  BP   169/78 (09/30/19 1751)   Temp   36.1 °C (97 °F) (09/30/19 1751)   Pulse   72 (09/30/19 1751)   Resp   16 (09/30/19 1751)     SpO2   96 % (09/30/19 1751)     Post Anesthesia Care and Evaluation    Level of consciousness: awake  Pain management: adequate  Airway patency: patent  Anesthetic complications: No anesthetic complications    Cardiovascular status: acceptable  Respiratory status: acceptable  Hydration status: acceptable    Comments: /78 (BP Location: Right arm, Patient Position: Lying)   Pulse 72   Temp 36.1 °C (97 °F) (Oral)   Resp 16   Ht 172.7 cm (67.99\")   Wt 94.3 kg (207 lb 14.3 oz)   SpO2 96%   BMI 31.62 kg/m²         "

## 2019-10-01 ENCOUNTER — TELEPHONE (OUTPATIENT)
Dept: GASTROENTEROLOGY | Facility: CLINIC | Age: 59
End: 2019-10-01

## 2019-10-01 VITALS
TEMPERATURE: 97.4 F | HEART RATE: 73 BPM | WEIGHT: 207.89 LBS | SYSTOLIC BLOOD PRESSURE: 114 MMHG | OXYGEN SATURATION: 96 % | RESPIRATION RATE: 16 BRPM | HEIGHT: 68 IN | BODY MASS INDEX: 31.51 KG/M2 | DIASTOLIC BLOOD PRESSURE: 68 MMHG

## 2019-10-01 LAB
ANION GAP SERPL CALCULATED.3IONS-SCNC: 9.8 MMOL/L (ref 5–15)
BUN BLD-MCNC: 9 MG/DL (ref 6–20)
BUN/CREAT SERPL: 17.3 (ref 7–25)
CALCIUM SPEC-SCNC: 8.2 MG/DL (ref 8.6–10.5)
CHLORIDE SERPL-SCNC: 102 MMOL/L (ref 98–107)
CO2 SERPL-SCNC: 30.2 MMOL/L (ref 22–29)
CREAT BLD-MCNC: 0.52 MG/DL (ref 0.57–1)
CYTO UR: NORMAL
GFR SERPL CREATININE-BSD FRML MDRD: 122 ML/MIN/1.73
GLUCOSE BLD-MCNC: 133 MG/DL (ref 65–99)
GLUCOSE BLDC GLUCOMTR-MCNC: 110 MG/DL (ref 70–130)
GLUCOSE BLDC GLUCOMTR-MCNC: 220 MG/DL (ref 70–130)
GLUCOSE BLDC GLUCOMTR-MCNC: 96 MG/DL (ref 70–130)
LAB AP CASE REPORT: NORMAL
PATH REPORT.FINAL DX SPEC: NORMAL
PATH REPORT.GROSS SPEC: NORMAL
POTASSIUM BLD-SCNC: 3.8 MMOL/L (ref 3.5–5.2)
SODIUM BLD-SCNC: 142 MMOL/L (ref 136–145)
T4 FREE SERPL-MCNC: 1.63 NG/DL (ref 0.93–1.7)
TSH SERPL DL<=0.05 MIU/L-ACNC: 9.91 UIU/ML (ref 0.27–4.2)

## 2019-10-01 PROCEDURE — 99024 POSTOP FOLLOW-UP VISIT: CPT | Performed by: SURGERY

## 2019-10-01 PROCEDURE — 99231 SBSQ HOSP IP/OBS SF/LOW 25: CPT | Performed by: INTERNAL MEDICINE

## 2019-10-01 PROCEDURE — 84439 ASSAY OF FREE THYROXINE: CPT | Performed by: HOSPITALIST

## 2019-10-01 PROCEDURE — 82962 GLUCOSE BLOOD TEST: CPT

## 2019-10-01 PROCEDURE — 84443 ASSAY THYROID STIM HORMONE: CPT | Performed by: HOSPITALIST

## 2019-10-01 PROCEDURE — 63710000001 INSULIN LISPRO (HUMAN) PER 5 UNITS: Performed by: NURSE PRACTITIONER

## 2019-10-01 PROCEDURE — 80048 BASIC METABOLIC PNL TOTAL CA: CPT | Performed by: HOSPITALIST

## 2019-10-01 RX ORDER — PROMETHAZINE HYDROCHLORIDE 12.5 MG/1
12.5 TABLET ORAL EVERY 6 HOURS PRN
Qty: 5 TABLET | Refills: 0 | Status: SHIPPED | OUTPATIENT
Start: 2019-10-01 | End: 2022-07-12

## 2019-10-01 RX ORDER — LEVOTHYROXINE SODIUM 175 UG/1
175 TABLET ORAL DAILY
Status: DISCONTINUED | OUTPATIENT
Start: 2019-10-02 | End: 2019-10-01 | Stop reason: HOSPADM

## 2019-10-01 RX ORDER — ONDANSETRON 4 MG/1
4 TABLET, ORALLY DISINTEGRATING ORAL EVERY 8 HOURS PRN
Qty: 10 TABLET | Refills: 0 | Status: SHIPPED | OUTPATIENT
Start: 2019-10-01 | End: 2019-10-04

## 2019-10-01 RX ORDER — LEVOTHYROXINE SODIUM 175 UG/1
175 TABLET ORAL DAILY
Qty: 30 TABLET | Refills: 0 | Status: SHIPPED | OUTPATIENT
Start: 2019-10-02 | End: 2022-01-10 | Stop reason: ALTCHOICE

## 2019-10-01 RX ORDER — HYDROCODONE BITARTRATE AND ACETAMINOPHEN 5; 325 MG/1; MG/1
1 TABLET ORAL EVERY 6 HOURS PRN
Qty: 8 TABLET | Refills: 0 | Status: SHIPPED | OUTPATIENT
Start: 2019-10-01 | End: 2019-10-09

## 2019-10-01 RX ORDER — PANTOPRAZOLE SODIUM 40 MG/1
40 TABLET, DELAYED RELEASE ORAL
Qty: 60 TABLET | Refills: 0 | Status: SHIPPED | OUTPATIENT
Start: 2019-10-01 | End: 2019-10-10

## 2019-10-01 RX ADMIN — LEVOTHYROXINE SODIUM 150 MCG: 150 TABLET ORAL at 10:26

## 2019-10-01 RX ADMIN — PANTOPRAZOLE SODIUM 40 MG: 40 TABLET, DELAYED RELEASE ORAL at 17:24

## 2019-10-01 RX ADMIN — ATORVASTATIN CALCIUM 20 MG: 20 TABLET, FILM COATED ORAL at 08:24

## 2019-10-01 RX ADMIN — SODIUM CHLORIDE, POTASSIUM CHLORIDE, SODIUM LACTATE AND CALCIUM CHLORIDE 100 ML/HR: 600; 310; 30; 20 INJECTION, SOLUTION INTRAVENOUS at 06:26

## 2019-10-01 RX ADMIN — PANTOPRAZOLE SODIUM 40 MG: 40 TABLET, DELAYED RELEASE ORAL at 06:23

## 2019-10-01 RX ADMIN — SUCRALFATE 1 G: 1 SUSPENSION ORAL at 06:23

## 2019-10-01 RX ADMIN — SUCRALFATE 1 G: 1 SUSPENSION ORAL at 17:24

## 2019-10-01 RX ADMIN — SUCRALFATE 1 G: 1 SUSPENSION ORAL at 11:23

## 2019-10-01 RX ADMIN — INSULIN LISPRO 4 UNITS: 100 INJECTION, SOLUTION INTRAVENOUS; SUBCUTANEOUS at 12:28

## 2019-10-01 RX ADMIN — HYDROCODONE BITARTRATE AND ACETAMINOPHEN 1 TABLET: 5; 325 TABLET ORAL at 06:23

## 2019-10-01 RX ADMIN — SODIUM CHLORIDE, PRESERVATIVE FREE 10 ML: 5 INJECTION INTRAVENOUS at 08:25

## 2019-10-01 RX ADMIN — OXYBUTYNIN CHLORIDE 5 MG: 5 TABLET, EXTENDED RELEASE ORAL at 08:25

## 2019-10-01 RX ADMIN — HYDROCODONE BITARTRATE AND ACETAMINOPHEN 1 TABLET: 5; 325 TABLET ORAL at 11:23

## 2019-10-01 RX ADMIN — BUSPIRONE HYDROCHLORIDE 10 MG: 10 TABLET ORAL at 08:24

## 2019-10-01 RX ADMIN — ESCITALOPRAM 20 MG: 20 TABLET, FILM COATED ORAL at 08:24

## 2019-10-01 RX ADMIN — HYDROCODONE BITARTRATE AND ACETAMINOPHEN 1 TABLET: 5; 325 TABLET ORAL at 17:24

## 2019-10-01 NOTE — DISCHARGE SUMMARY
Discharge Summary    Patient Name: Eunice Ennis  Age/Sex: 58 y.o. female  : 1960  MRN: [id number]  Patient Care Team:  Manny Lyn Jr., MD as PCP - General (Internal Medicine)    Hospital Course     Date of Admit: 2019  Date of Discharge:  10/01/19   Discharge Condition: Stable    Discharge Diagnoses:    Intractable nausea and vomiting    Hypothyroid    Diabetes mellitus (CMS/HCC)    Dyspepsia    Esophagitis    HLD (hyperlipidemia)    Intractable vomiting with nausea    Biliary dyskinesia       Hospital Course:   Eunice Ennis presented to [facility] with complaints of RUQ abdominal pain and nausea. Please see the admitting history and physical for further details. She was found to have dyspepsia and was admitted to the hospital for further evaluation and treatment. She was seen in consultation by GI and general surgery. She underwent EGD on 19 that showed grade D esophagitis and was treated with PPI BID and carafate, but continued to have symptoms. She had a prior hx of H pylori, but no presence on pathology report. She will need a repeat EGD in 3 months.     She did have some intractable nausea and vomiting and was thought to possibly have some gastroparesis that could consider further evaluation as outpatient with gastric emptying study. She had a HIDA scan that showed biliary dyskinsea with decreased gallbladder ejection fraction and ended up undergoing cholecystectomy on 19. Post operative her diet was advanced and she was tolerating ok with mild abdominal discomfort and minimal nausea, but no more vomiting. GI and surgery felt it was safe for discharge. She can continue home tramadol for pain. barrington was reviewed and was recently prescribed tramadol 2 days prior to admission.     TSH was checked while here and was noted to be elevated and was previously elevated in 2019. Free T4 was normal and levothyroxine was increased to 175 mcg. She will need  to follow up with PCP in 4-6 weeks with repeat studies and adjust medication if indicated.     Consults:   IP CONSULT TO GASTROENTEROLOGY  IP CONSULT TO GENERAL SURGERY    Significant Discharge Diagnostics   Procedures Performed:  Procedure(s):  EGD  CHOLECYSTECTOMY LAPAROSCOPIC INTRAOPERATIVE CHOLANGIOGRAM       Pertinent Lab Results:  Results from last 7 days   Lab Units 10/01/19  0511 09/30/19  0433 09/29/19  0536 09/28/19  0541 09/27/19  1433 09/24/19  1730   SODIUM mmol/L 142 142 137 139 138 138   POTASSIUM mmol/L 3.8 3.5 4.3 3.3* 3.9 4.3   CHLORIDE mmol/L 102 102 98 99 98 97*   CO2 mmol/L 30.2* 27.0 26.5 28.2 21.9* 24.2   BUN mg/dL 9 11 13 12 18 23*   CREATININE mg/dL 0.52* 0.57 0.57 0.59 0.62 0.71   GLUCOSE mg/dL 133* 99 127* 132* 126* 199*   CALCIUM mg/dL 8.2* 8.0* 8.4* 8.1* 8.6 9.6   AST (SGOT) U/L  --   --  15  --   --  14   ALT (SGPT) U/L  --   --  10  --   --  13     Results from last 7 days   Lab Units 09/27/19  2250   TROPONIN T ng/mL <0.010     Results from last 7 days   Lab Units 09/30/19  0433 09/29/19  0536 09/28/19  0541 09/27/19  1642 09/24/19  1730   WBC 10*3/mm3 8.20 8.19 7.62 10.54 12.93*   HEMOGLOBIN g/dL 13.7 14.6 14.0 14.5 15.1   HEMATOCRIT % 39.3 46.0 41.3 43.5 46.0   PLATELETS 10*3/mm3 207 225 210 204 258   MCV fL 87.5 89.1 86.6 88.4 90.7   MCH pg 30.5 28.3 29.4 29.5 29.8   MCHC g/dL 34.9 31.7 33.9 33.3 32.8   RDW % 12.0* 12.4 12.0* 12.6 12.8   RDW-SD fl 38.6 41.6 37.9 40.8 42.1   MPV fL 9.1 9.1 9.4 9.2 9.4   NEUTROPHIL % %  --   --  56.0  --  82.6*   LYMPHOCYTE % %  --   --  31.8  --  10.8*   MONOCYTES % %  --   --  10.6  --  5.9   EOSINOPHIL % %  --   --  0.8  --  0.0*   BASOPHIL % %  --   --  0.4  --  0.2   IMM GRAN % %  --   --  0.4  --  0.5   NEUTROS ABS 10*3/mm3  --   --  4.27 5.48 10.68*   LYMPHS ABS 10*3/mm3  --   --  2.42  --  1.40   MONOS ABS 10*3/mm3  --   --  0.81  --  0.76   EOS ABS 10*3/mm3  --   --  0.06  --  0.00   BASOS ABS 10*3/mm3  --   --  0.03 0.21* 0.03   IMMATURE  GRANS (ABS) 10*3/mm3  --   --  0.03  --  0.06*   NRBC /100 WBC  --   --  0.0  --  0.0                   Invalid input(s): LDLCALC      Results from last 7 days   Lab Units 10/01/19  0511   TSH uIU/mL 9.910*                 Results from last 7 days   Lab Units 09/24/19  1730   LIPASE U/L 19                       Imaging Results:  Imaging Results (most recent)     Procedure Component Value Units Date/Time    FL Cholangiogram Operative [[id number]] Collected:  09/30/19 1205     Updated:  09/30/19 1209    Narrative:       INTRAOPERATIVE PORTABLE VIEW CHOLANGIOGRAM     CLINICAL INFORMATION: Post cholecystectomy     FINDINGS: Upon injection of the cystic duct with contrast, the proximal  intrahepatic, common hepatic and common bile ducts are opacified.  Contrast passes into the duodenum. No filling defects are visualized to  suggest choledocholithiasis.     Fluoroscopy time 9 s, 52 images     This report was finalized on 9/30/2019 12:06 PM by Dr. Terry Muller M.D.       NM HIDA Scan With Pharmacological Intervention [[id number]] Collected:  09/28/19 1843     Updated:  09/28/19 1848    Narrative:       NM HIDA SCAN WITH PHARMACOLOGICAL INTERVENTION-     INDICATIONS: Right upper quadrant pain     TECHNIQUE: HEPATOBILIARY SCINTIGRAPHY     COMPARISON: None available     FINDINGS:      Radiotracer: 5.5 mCi technetium 99m Choletec, intravenous.     Anterior projections of the abdomen were obtained following radiotracer  administration. There is prompt visualization of the gallbladder, common  biliary duct, and small bowel.     At 60 minutes, 1.9 mcg of cholecystokinin was administered  intravenously, with no reported symptomatology. Images were obtained at  1 minute intervals for 30 minutes, revealing no obvious decrease in  gallbladder activity. Calculated gallbladder ejection fraction was 14 %,  normal is 35%.             Impression:             1. Prompt visualization of the gallbladder, which will exclude  acute  cholecystitis in 95% of cases.  2. The gallbladder ejection fraction as abnormally decreased, compatible  with biliary dyskinesia.     This report was finalized on 9/28/2019 6:45 PM by Dr. Te Orourke M.D.       US Abdomen Complete [[id number]] Collected:  09/27/19 1601     Updated:  09/27/19 1607    Narrative:       US ABDOMEN COMPLETE-     INDICATIONS: Hepatomegaly, abdominal pain     TECHNIQUE: Ultrasound of the abdomen     COMPARISON: CT from 09/24/2019     FINDINGS:     The gallbladder is nontender, with no stones demonstrated. No  gallbladder wall thickening or pericholecystic fluid.     No intrahepatic or extrahepatic biliary ductal dilatation is  demonstrated. The common biliary duct caliber is measured at 4 mm.     No liver lesion is identified. Diffusely, the echogenicity of the liver  is otherwise in the range of normal relative to that of the right  kidney.     The pancreas is partly obscured. The right kidney, 11.9 cm, left kidney,  11.7 cm, spleen, 5.7 cm, and the pancreas otherwise appear unremarkable.     Distal abdominal aorta is obscured by bowel gas. Otherwise unremarkable  appearance of the aorta, inferior vena cava.          Impression:          No evidence for acute cholecystitis. With persistent clinical  indication, hepatobiliary scintigraphy could be considered for further  evaluation.           This report was finalized on 9/27/2019 4:03 PM by Dr. Te Orourke M.D.               Objective:   Temp:  [96.9 °F (36.1 °C)-97.9 °F (36.6 °C)] 97.4 °F (36.3 °C)  Heart Rate:  [60-89] 73  Resp:  [16] 16  BP: (114-169)/(61-78) 114/68   SpO2:  [93 %-96 %] 96 %  on    Device (Oxygen Therapy): room air    Intake/Output Summary (Last 24 hours) at 10/1/2019 1646  Last data filed at 10/1/2019 0921  Gross per 24 hour   Intake 3701 ml   Output 1550 ml   Net 2151 ml     Body mass index is 31.62 kg/m².      09/27/19 1957   Weight: 94.3 kg (207 lb 14.3 oz)       Physical Exam    Constitutional: She appears well-developed and well-nourished.   HENT:   Head: Normocephalic and atraumatic.   Cardiovascular: Normal rate and regular rhythm. Exam reveals no friction rub.   Murmur heard.  Pulmonary/Chest: Effort normal. She has rales (fine BLL).   Abdominal: Soft. Bowel sounds are normal. She exhibits no distension. There is tenderness ( appropriately mild tenderness).   Neurological: She is alert.   Skin: Skin is warm and dry.       Discharge Medications and Instructions:     Discharge Medications     Discharge Medications      Changes to Medications      Instructions Start Date   levothyroxine 175 MCG tablet  Commonly known as:  SYNTHROID, LEVOTHROID  What changed:    · medication strength  · how much to take   175 mcg, Oral, Daily   Start Date:  10/2/2019     pantoprazole 40 MG EC tablet  Commonly known as:  PROTONIX  What changed:  when to take this   40 mg, Oral, 2 Times Daily Before Meals         Continue These Medications      Instructions Start Date   atorvastatin 20 MG tablet  Commonly known as:  LIPITOR   TAKE 1 TABLET BY MOUTH ONE TIME A DAY       budesonide-formoterol 160-4.5 MCG/ACT inhaler  Commonly known as:  SYMBICORT   2 puffs, Inhalation, 2 Times Daily - RT      busPIRone 10 MG tablet  Commonly known as:  BUSPAR   TAKE ONE TABLET BY MOUTH TWICE A DAY      escitalopram 20 MG tablet  Commonly known as:  LEXAPRO   TAKE ONE TABLET BY MOUTH DAILY      HUMALOG KWIKPEN 100 UNIT/ML solution pen-injector  Generic drug:  Insulin Lispro   ss      loratadine 10 MG tablet  Commonly known as:  CLARITIN   10 mg, Oral, Daily      metFORMIN 500 MG tablet  Commonly known as:  GLUCOPHAGE   TAKE 2 TABLETS BY MOUTH TWO TIMES A DAY WITH MEALS      ondansetron ODT 4 MG disintegrating tablet  Commonly known as:  ZOFRAN-ODT   4 mg, Oral, Every 8 Hours PRN      OZEMPIC SC   5 Units/day, Subcutaneous, weekly       promethazine 25 MG tablet  Commonly known as:  PHENERGAN   25 mg, Oral, Every 6 Hours PRN       sucralfate 1 GM/10ML suspension  Commonly known as:  CARAFATE   1 g, Oral, 4 Times Daily Before Meals & Nightly      traMADol 50 MG tablet  Commonly known as:  ULTRAM   50 mg, Oral, Every 4 Hours PRN      VESICARE 5 MG tablet  Generic drug:  solifenacin   No dose, route, or frequency recorded.         Stop These Medications    lansoprazole 30 MG capsule  Commonly known as:  PREVACID            Discharge Diet:    Dietary Orders (From admission, onward)    Start     Ordered    10/01/19 1521  Diet Regular; GI Soft / Sterling, Consistent Carbohydrate, Low Fat  Diet Effective Now     Question Answer Comment   Diet Texture / Consistency Regular    Common Modifiers GI Soft / Sterling    Common Modifiers Consistent Carbohydrate    Common Modifiers Low Fat        10/01/19 1521          Activity at Discharge:    Activity Instructions     Activity as Tolerated             Discharge disposition: Home    Discharge Instructions and Follow ups:  Follow-up Information     Jonathan Tiwari Jr., MD. Schedule an appointment as soon as possible for a visit in 2 week(s).    Specialty:  General Surgery  Contact information:  [address] [facility] [address]  [facility] [id number] [phone]             Manny Lyn Jr., MD Follow up.    Specialty:  Internal Medicine  Why:  repeat thyroid studies in 4-6 weeks  Contact information:  [address] [facility] [id number] [phone]             [facility] GASTROENTEROLOGY Follow up in 4 week(s).    Specialty:  Gastroenterology  Contact information:  [address]  [facility] [address] [phone]  Additional information:  Phone Number: [phone]               Future Appointments   Date Time Provider Department Center   10/15/2019  9:20 AM Jonathan Tiwari Jr., MD [facility]  [facility] None   10/19/2019  7:30 AM [facility]          Medication Reconciliation: Please see electronically completed Med Rec.    Total time spent discharging patient  including evaluation, medication reconciliation, arranging follow up, and post hospitalization instructions and education total time exceeds 30 minutes.        JOHNNY Durant  10/01/19  4:15 PM    Discussed with patient.  Discussed with nurse practitioner and the patient's nurse.  Patient is upset currently.  Discussed risk and benefits of pain medication and the addictive nature.  We will give the patient a small amount of pain medication for discharge.  Lortabs have been sent to her pharmacy.  Bo was obtained by Dianna Koehler.  Bo does show the patient recently got Ultram.  Patient has been using Lortab here for pain control so we will use Lortab on discharge.  Patient is also requesting some Phenergan and case the nausea returns.  Only 5 Phenergan given.    Patient's chart reviewed.    Assuming patient is doing reasonably well after dinner the patient is okay for discharge.    Josh Osuna MD

## 2019-10-01 NOTE — PLAN OF CARE
Problem: Patient Care Overview  Goal: Plan of Care Review  Outcome: Ongoing (interventions implemented as appropriate)   10/01/19 0607   Coping/Psychosocial   Plan of Care Reviewed With patient   Plan of Care Review   Progress improving   OTHER   Outcome Summary vss, pain well controlled by po pain med, tolerating ncsd, encourage to increase fluid intake and to use incentive spirometer, voiding freely.     Goal: Individualization and Mutuality  Outcome: Ongoing (interventions implemented as appropriate)    Goal: Discharge Needs Assessment  Outcome: Ongoing (interventions implemented as appropriate)    Goal: Interprofessional Rounds/Family Conf  Outcome: Ongoing (interventions implemented as appropriate)      Problem: Nausea/Vomiting (Adult)  Goal: Symptom Relief  Outcome: Ongoing (interventions implemented as appropriate)    Goal: Adequate Hydration  Outcome: Ongoing (interventions implemented as appropriate)      Problem: Pain, Acute (Adult)  Goal: Identify Related Risk Factors and Signs and Symptoms  Outcome: Ongoing (interventions implemented as appropriate)    Goal: Acceptable Pain Control/Comfort Level  Outcome: Ongoing (interventions implemented as appropriate)

## 2019-10-01 NOTE — PROGRESS NOTES
Discharge Planning Assessment  [facility]     Patient Name: Eunice Ennis  MRN: [id number]  Today's Date: 10/1/2019    Admit Date: 9/27/2019      Discharge Plan     Row Name 10/01/19 1624       Plan    Plan Comments  Discharge order, no discharge needs identified.     Final Discharge Disposition Code  01 - home or self-care       Jolie Osborne RN

## 2019-10-01 NOTE — PROGRESS NOTES
[facility]  Inpatient Progress Note    Reason for Follow Up:  GERD w/esophagitis, n/v    Subjective     Interval History:   She continues to feel better.  She is appropriately sore from the surgery especially in the right upper quadrant but she is had no nausea.  She is tolerating a diet.    Current Facility-Administered Medications:   •  acetaminophen (TYLENOL) tablet 650 mg, 650 mg, Oral, Q4H PRN, 650 mg at 09/28/19 2017 **OR** acetaminophen (TYLENOL) 160 MG/5ML solution 650 mg, 650 mg, Oral, Q4H PRN **OR** acetaminophen (TYLENOL) suppository 650 mg, 650 mg, Rectal, Q4H PRN, Krystina Sampson, APRN  •  atorvastatin (LIPITOR) tablet 20 mg, 20 mg, Oral, Daily, Krystina Sampson, APRN, 20 mg at 10/01/19 0824  •  budesonide-formoterol (SYMBICORT) 160-4.5 MCG/ACT inhaler 2 puff, 2 puff, Inhalation, BID - RT, Krystina Sampson, APRN  •  busPIRone (BUSPAR) tablet 10 mg, 10 mg, Oral, BID, Krystina Sampson, APRN, 10 mg at 10/01/19 0824  •  dextrose (D50W) 25 g/ 50mL Intravenous Solution 25 g, 25 g, Intravenous, Q15 Min PRN, Krystina Sampson, APRN  •  dextrose (GLUTOSE) oral gel 15 g, 15 g, Oral, Q15 Min PRN, Krystina Sampson, APRN  •  escitalopram (LEXAPRO) tablet 20 mg, 20 mg, Oral, Daily, Krystina Sampson, APRN, 20 mg at 10/01/19 0824  •  glucagon (human recombinant) (GLUCAGEN DIAGNOSTIC) injection 1 mg, 1 mg, Subcutaneous, PRN, Krystina Sampson, APRN  •  HYDROcodone-acetaminophen (NORCO) 5-325 MG per tablet 1 tablet, 1 tablet, Oral, Q6H PRN, Gabriel Louis MD, 1 tablet at 10/01/19 1123  •  insulin lispro (humaLOG) injection 0-9 Units, 0-9 Units, Subcutaneous, 4x Daily With Meals & Nightly, Krystina Sampson, APRN, 4 Units at 10/01/19 1228  •  lactated ringers infusion, 100 mL/hr, Intravenous, Continuous, Gabriel Louis MD, Last Rate: 100 mL/hr at 10/01/19 0626, 100 mL/hr at 10/01/19 0626  •  levothyroxine (SYNTHROID, LEVOTHROID) tablet 150 mcg, 150 mcg, Oral,  Daily, Krystina Sampson APRN, 150 mcg at 10/01/19 1026  •  morphine injection 2 mg, 2 mg, Intravenous, Q4H PRN, Gabriel Louis MD  •  morphine injection 2 mg, 2 mg, Intravenous, Q2H PRN, Jonathan Tiwari Jr., MD, 2 mg at 09/30/19 1727  •  ondansetron (ZOFRAN) tablet 4 mg, 4 mg, Oral, Q6H PRN **OR** ondansetron (ZOFRAN) injection 4 mg, 4 mg, Intravenous, Q6H PRN, Gabriel Louis MD  •  oxybutynin XL (DITROPAN-XL) 24 hr tablet 5 mg, 5 mg, Oral, Daily, Krystina Sampson APRN, 5 mg at 10/01/19 0825  •  pantoprazole (PROTONIX) EC tablet 40 mg, 40 mg, Oral, BID AC, Gabriel Louis MD, 40 mg at 10/01/19 0623  •  potassium chloride (MICRO-K) CR capsule 40 mEq, 40 mEq, Oral, PRN, 40 mEq at 09/28/19 1850 **OR** potassium chloride (KLOR-CON) packet 40 mEq, 40 mEq, Oral, PRN **OR** potassium chloride 10 mEq in 100 mL IVPB, 10 mEq, Intravenous, Q1H PRN, Gabriel Louis MD, Stopped at 09/30/19 0900  •  promethazine (PHENERGAN) injection 12.5 mg, 12.5 mg, Intravenous, Q6H PRN, Krystina Sampson APRN, 12.5 mg at 09/29/19 2331  •  sodium chloride 0.9 % flush 10 mL, 10 mL, Intravenous, Q12H, Krystina Sampson, APRN, 10 mL at 10/01/19 0825  •  sodium chloride 0.9 % flush 10 mL, 10 mL, Intravenous, PRN, Krystina Sampson, APRN, 10 mL at 09/27/19 1309  •  sodium chloride 0.9 % infusion, 30 mL/hr, Intravenous, Continuous PRN, Rony Barakat MD, Stopped at 09/28/19 0013  •  sucralfate (CARAFATE) 1 GM/10ML suspension 1 g, 1 g, Oral, 4x Daily AC & at Bedtime, Krystina Sampson, APRN, 1 g at 10/01/19 1123  Review of Systems:    All systems were reviewed and negative except for:  Gastrointestinal: positive for  pain    Objective     Vital Signs  Temp:  [96.9 °F (36.1 °C)-97.9 °F (36.6 °C)] 97.4 °F (36.3 °C)  Heart Rate:  [60-89] 73  Resp:  [16] 16  BP: (114-174)/(61-88) 114/68  Body mass index is 31.62 kg/m².    Intake/Output Summary (Last 24 hours) at 10/1/2019 1449  Last data filed at 10/1/2019 0921  Gross  per 24 hour   Intake 3701 ml   Output 1850 ml   Net 1851 ml     I/O this shift:  In: 360 [P.O.:360]  Out: 200 [Urine:200]     Physical Exam:   General: patient awake, alert and cooperative   Eyes: Normal lids and lashes, no scleral icterus   Neck: supple, normal ROM   Skin: warm and dry, not jaundiced   Abdomen: soft, appropriately tender nondistended;    Extremities: no rash or edema   Psychiatric: Normal mood and behavior; memory intact     Results Review:     I reviewed the patient's new clinical results.    Results from last 7 days   Lab Units 09/30/19  0433 09/29/19  0536 09/28/19  0541   WBC 10*3/mm3 8.20 8.19 7.62   HEMOGLOBIN g/dL 13.7 14.6 14.0   HEMATOCRIT % 39.3 46.0 41.3   PLATELETS 10*3/mm3 207 225 210     Results from last 7 days   Lab Units 10/01/19  0511 09/30/19  0433 09/29/19  0536  09/24/19  1730   SODIUM mmol/L 142 142 137   < > 138   POTASSIUM mmol/L 3.8 3.5 4.3   < > 4.3   CHLORIDE mmol/L 102 102 98   < > 97*   CO2 mmol/L 30.2* 27.0 26.5   < > 24.2   BUN mg/dL 9 11 13   < > 23*   CREATININE mg/dL 0.52* 0.57 0.57   < > 0.71   CALCIUM mg/dL 8.2* 8.0* 8.4*   < > 9.6   BILIRUBIN mg/dL  --   --  0.5  --  0.6   ALK PHOS U/L  --   --  59  --  71   ALT (SGPT) U/L  --   --  10  --  13   AST (SGOT) U/L  --   --  15  --  14   GLUCOSE mg/dL 133* 99 127*   < > 199*    < > = values in this interval not displayed.         Lab Results   Lab Value Date/Time    LIPASE 19 09/24/2019 1730    LIPASE 20 09/24/2019 0732       Radiology:  FL Cholangiogram Operative   Final Result      NM HIDA Scan With Pharmacological Intervention   Final Result           1. Prompt visualization of the gallbladder, which will exclude acute   cholecystitis in 95% of cases.   2. The gallbladder ejection fraction as abnormally decreased, compatible   with biliary dyskinesia.       This report was finalized on 9/28/2019 6:45 PM by Dr. Te Orourke M.D.          US Abdomen Complete   Final Result       No evidence for acute  cholecystitis. With persistent clinical   indication, hepatobiliary scintigraphy could be considered for further   evaluation.               This report was finalized on 9/27/2019 4:03 PM by Dr. Te Orourke M.D.              Assessment/Plan     Patient Active Problem List   Diagnosis   • Hypothyroid   • Diabetes mellitus (CMS/HCC)   • Constipation   • Dyspepsia   • Esophagitis   • Bacterial infection due to Helicobacter pylori   • Intractable nausea and vomiting   • HLD (hyperlipidemia)   • Intractable vomiting with nausea   • Biliary dyskinesia       Assessment:  1. GERD with esophagitis  2. Chronic intractable nausea and vomiting  3. Biliary dyskinesia        Plan:  · Expected postop pain but otherwise seems like she is improving.  Will need continued observation to ensure that her symptoms resolve completely.  · Continue twice daily PPI  · Diet per surgery  · Esophageal biopsies with inflammation only  · Will need repeat EGD in 3 months  · Recommend office follow-up with our NP in 4 weeks-patient will call to schedule.    · No further recommendations at this time-we will sign off but are available as needed     I discussed the patients findings and my recommendations with patient.    Mildred Vaughn MD

## 2019-10-01 NOTE — PROGRESS NOTES
Discharge Planning Assessment  [facility]     Patient Name: Eunice Ennis  MRN: [id number]  Today's Date: 10/1/2019    Admit Date: 9/27/2019    Discharge Needs Assessment     Row Name 10/01/19 1614       Living Environment    Lives With  spouse    Name(s) of Who Lives With Patient  -- : Robin    Current Living Arrangements  home/apartment/condo    Primary Care Provided by  self    Provides Primary Care For  no one    Family Caregiver if Needed  spouse;child(emely), adult    Family Caregiver Names  -- : Robin and daughter: Marina Cushing    Quality of Family Relationships  supportive    Able to Return to Prior Arrangements  yes       Resource/Environmental Concerns    Resource/Environmental Concerns  none    Transportation Concerns  car, none       Transition Planning    Patient/Family Anticipates Transition to  home with family    Patient/Family Anticipated Services at Transition  none    Transportation Anticipated  family or friend will provide       Discharge Needs Assessment    Readmission Within the Last 30 Days  no previous admission in last 30 days    Concerns to be Addressed  no discharge needs identified;adjustment to diagnosis/illness    Anticipated Changes Related to Illness  none    Equipment Needed After Discharge  none        Discharge Plan     Row Name 10/01/19 4451       Plan    Plan Comments  Pt confirmed the address, PCP and [facility] is correct. Pt said she lives with her  Robin, is IADL, uses no DME, drives and has transportation, has never had home health or been to a SNF and can afford her Rx. Pt said her dtr Lisa Albert lives nearby and can assist if needed.  Pt said she has never had home health and was interested in it, I advised it requires a dr's order and she must be home bound.  Pt said she does not have a HCS / Medical POA.    I advised I was ask to speak with her to arrange transportation home, she said the dr told her she could stay until  tomorrow due to her  is off work tomorrow.  After checking with [facility] I spoke with pt again, a release for Lyft was provided, she declined Lyft transportation adding her  will pick her up when he gets off work tonight.   Pt declined hospital follow up with PCP due to she plans to follow up with GI and does not feel PCP can help her.  Jolie Osborne RN    Row Name 10/01/19 1602       Plan    Plan  Home w/o needs       Demographic Summary     Row Name 10/01/19 1607       General Information    Admission Type  inpatient    Referral Source  admission list    Reason for Consult  discharge planning    Preferred Language  English     Used During This Interaction  no       Contact Information    Permission Granted to Share Info With  family/designee        Functional Status     Row Name 10/01/19 1614       Functional Status, IADL    Medications  independent    Meal Preparation  independent    Housekeeping  independent    Laundry  independent    Shopping  independent     Patient Forms     Row Name 10/01/19 1607       Patient Forms    Provider Choice List  Delivered    Delivered to  Patient    Method of delivery  In person            Jolie Osborne RN

## 2019-10-01 NOTE — TELEPHONE ENCOUNTER
Pt currently admitted to [facility].     Called pt back. She answered the line, but told me she had another MD office calling in. She hung up the line on me. She will call back if she needs to speak with the office staff.

## 2019-10-01 NOTE — TELEPHONE ENCOUNTER
----- Message from Linda Larose sent at 10/1/2019  3:41 PM EDT -----  Regarding: question   Contact: [phone]  Pt wants to talk with a nurse with questions regarding appointment and colonoscopy

## 2019-10-01 NOTE — PROGRESS NOTES
Chief Complaint:    S/P Laparoscopic cholecystectomy with intraoperative cholangiogram, POD 1    Subjective:    The patient is feeling well but she complains of expected postop abdominal pain.  She has mild nausea but she says it is improved over her baseline.  She tolerated a clear liquid breakfast.    Objective:    Temp:  [96.9 °F (36.1 °C)-97.9 °F (36.6 °C)] 96.9 °F (36.1 °C)  Heart Rate:  [60-72] 60  Resp:  [16-20] 16  BP: (126-182)/(70-88) 143/78    Physical Exam   Constitutional: She is cooperative. She does not appear ill. No distress.   Abdominal: Soft. There is tenderness (Appropriate postop tenderness) in the epigastric area.   Neurological: She is alert.       Results:    Electrodes are basically normal.    Impression/Plan:    The patient is POD 1 from a laparoscopic cholecystectomy with intraoperative cholangiogram.  The patient is recovering well.  Her diet will be advanced.    She will need a period of recovery to see if her nausea improves.  She is ready for discharge to home from a surgical standpoint.    I will follow peripherally.    Jonathan Tiwari Jr., M.D.

## 2019-10-02 ENCOUNTER — READMISSION MANAGEMENT (OUTPATIENT)
Dept: CALL CENTER | Facility: HOSPITAL | Age: 59
End: 2019-10-02

## 2019-10-02 NOTE — OUTREACH NOTE
Prep Survey      Responses   Facility patient discharged from?  [facility]   Is patient eligible?  Yes   Discharge diagnosis  Intractable N/V, hypothyroid, DM, Dyspepsia, esophagitis, HLD, biliary dyskinesia, s/p EGD, s/p Lap. Cholecystectomy   Does the patient have one of the following disease processes/diagnoses(primary or secondary)?  General Surgery   Does the patient have Home health ordered?  No   Is there a DME ordered?  No   Comments regarding appointments  Pt to schedule follow up with PCP   Prep survey completed?  Yes          America Ortiz RN

## 2019-10-03 ENCOUNTER — READMISSION MANAGEMENT (OUTPATIENT)
Dept: CALL CENTER | Facility: HOSPITAL | Age: 59
End: 2019-10-03

## 2019-10-03 NOTE — OUTREACH NOTE
General Surgery Week 1 Survey      Responses   Facility patient discharged from?  [place]   Does the patient have one of the following disease processes/diagnoses(primary or secondary)?  General Surgery   Is there a successful TCM telephone encounter documented?  No   Week 1 attempt successful?  Yes   Call start time  0946   Call end time  [phone]   Discharge diagnosis  Intractable N/V, hypothyroid, DM, Dyspepsia, esophagitis, HLD, biliary dyskinesia, s/p EGD, s/p Lap. Cholecystectomy   Is patient permission given to speak with other caregiver?  No   Meds reviewed with patient/caregiver?  Yes   Is the patient having any side effects they believe may be caused by any medication additions or changes?  No   Does the patient have all medications related to this admission filled (includes all antibiotics, pain medications, etc.)  No   What is keeping the patient from filling the prescriptions?  -- [Patient has not picked up prescriptions from pharmacy yet. ]   Nursing Interventions  Nurse provided patient education   Is the patient taking all medications as directed (includes completed medication regime)?  No   What is preventing the patient from taking all medications as directed?  Other   Nursing Interventions  Nurse provided patient education [Advised patient to  prescriptions and follow AVS medication instruction. ]   Does the patient have a follow up appointment scheduled with their surgeon?  Yes [Tuesday Oct 15, 2019 9:20 AM ]   Has the patient kept scheduled appointments due by today?  N/A   Has home health visited the patient within 72 hours of discharge?  N/A   Psychosocial issues?  No   Did the patient receive a copy of their discharge instructions?  Yes   Nursing interventions  Reviewed instructions with patient   What is the patient's perception of their health status since discharge?  Improving   Nursing interventions  Nurse provided patient education   Is the patient /caregiver able to teach back  basic post-op care?  Lifting as instructed by MD in discharge instructions, Do not remove steri-strips, Keep incision areas clean,dry and protected, No tub bath, swimming, or hot tub until instructed by MD, Take showers only when approved by MD-sponge bathe until then   Is the patient/caregiver able to teach back signs and symptoms of incisional infection?  Increased redness, swelling or pain at the incisonal site, Increased drainage or bleeding, Incisional warmth, Pus or odor from incision, Fever   Is the patient/caregiver able to teach back steps to recovery at home?  Set small, achievable goals for return to baseline health, Rest and rebuild strength, gradually increase activity   Is the patient/caregiver able to teach back the hierarchy of who to call/visit for symptoms/problems? PCP, Specialist, Home health nurse, Urgent Care, ED, 911  Yes   Week 1 call completed?  Yes          America Ricketts RN

## 2019-10-04 ENCOUNTER — PREP FOR SURGERY (OUTPATIENT)
Dept: OTHER | Facility: HOSPITAL | Age: 59
End: 2019-10-04

## 2019-10-04 DIAGNOSIS — R68.81 EARLY SATIETY: Primary | ICD-10-CM

## 2019-10-08 RX ORDER — LEVOTHYROXINE SODIUM 0.15 MG/1
TABLET ORAL
Qty: 30 TABLET | Refills: 0 | Status: SHIPPED | OUTPATIENT
Start: 2019-10-08 | End: 2019-11-04 | Stop reason: SDUPTHER

## 2019-10-09 PROBLEM — R68.81 EARLY SATIETY: Status: ACTIVE | Noted: 2019-10-09

## 2019-10-10 ENCOUNTER — HOSPITAL ENCOUNTER (OUTPATIENT)
Dept: MAMMOGRAPHY | Facility: HOSPITAL | Age: 59
Discharge: HOME | End: 2019-10-10
Admitting: INTERNAL MEDICINE

## 2019-10-10 ENCOUNTER — OFFICE VISIT (OUTPATIENT)
Dept: GASTROENTEROLOGY | Facility: CLINIC | Age: 59
End: 2019-10-10

## 2019-10-10 VITALS
HEIGHT: 69 IN | TEMPERATURE: 97.6 F | SYSTOLIC BLOOD PRESSURE: 128 MMHG | BODY MASS INDEX: 31.1 KG/M2 | DIASTOLIC BLOOD PRESSURE: 80 MMHG | WEIGHT: 210 LBS

## 2019-10-10 DIAGNOSIS — Z12.31 SCREENING MAMMOGRAM, ENCOUNTER FOR: ICD-10-CM

## 2019-10-10 DIAGNOSIS — K21.00 GASTROESOPHAGEAL REFLUX DISEASE WITH ESOPHAGITIS: Primary | ICD-10-CM

## 2019-10-10 PROCEDURE — 77067 SCR MAMMO BI INCL CAD: CPT

## 2019-10-10 PROCEDURE — 99214 OFFICE O/P EST MOD 30 MIN: CPT | Performed by: NURSE PRACTITIONER

## 2019-10-10 PROCEDURE — 77063 BREAST TOMOSYNTHESIS BI: CPT

## 2019-10-10 RX ORDER — SUCRALFATE ORAL 1 G/10ML
1 SUSPENSION ORAL 2 TIMES DAILY
Qty: 420 ML | Refills: 2 | Status: SHIPPED | OUTPATIENT
Start: 2019-10-10 | End: 2019-12-08 | Stop reason: SDUPTHER

## 2019-10-10 RX ORDER — PANTOPRAZOLE SODIUM 40 MG/1
40 TABLET, DELAYED RELEASE ORAL 2 TIMES DAILY
Qty: 180 TABLET | Refills: 3 | Status: SHIPPED | OUTPATIENT
Start: 2019-10-10 | End: 2020-11-11

## 2019-10-10 NOTE — PROGRESS NOTES
Chief Complaint   Patient presents with   • Esophagitis     Follow-up from hospitalization     HPI    Eunice Ennis is a  58 y.o. female here for a follow up visit for esophagitis.    This is an established patient of Dr. Trimble known to me.    She is seen today in follow-up for recent hospitalization last month which I reviewed discharge summary as follows:    Eunice Ennis presented to [facility] with complaints of RUQ abdominal pain and nausea. Please see the admitting history and physical for further details. She was found to have dyspepsia and was admitted to the hospital for further evaluation and treatment. She was seen in consultation by GI and general surgery. She underwent EGD on 9/27/19 that showed grade D esophagitis and was treated with PPI BID and carafate, but continued to have symptoms. She had a prior hx of H pylori, but no presence on pathology report. She will need a repeat EGD in 3 months.       She did have some intractable nausea and vomiting and was thought to possibly have some gastroparesis that could consider further evaluation as outpatient with gastric emptying study. She had a HIDA scan that showed biliary dyskinsea with decreased gallbladder ejection fraction and ended up undergoing cholecystectomy on 9/30/19. Post operative her diet was advanced and she was tolerating ok with mild abdominal discomfort and minimal nausea, but no more vomiting. GI and surgery felt it was safe for discharge. She can continue home tramadol for pain. barrington was reviewed and was recently prescribed tramadol 2 days prior to admission.      TSH was checked while here and was noted to be elevated and was previously elevated in 1/2019. Free T4 was normal and levothyroxine was increased to 175 mcg. She will need to follow up with PCP in 4-6 weeks with repeat studies and adjust medication if indicated.     ---------------------------------    Today patient reports resolution of  abdominal pain.  No further issues with nausea or vomiting.  It was recommended that she take PPI therapy twice a day but she is only taking this once a day and she did not receive a prescription to continue Carafate twice daily as an outpatient.  Appetite is good.  Her weight is stable.  She is avoiding NSAIDs.    She is been prone to constipation in the past but since having her gallbladder out her bowels are moving multiple times a day.  No diarrhea.  No rectal bleeding.    She is scheduled for follow-up EGD to assess mucosal healing and response to medication in December.    She is up-to-date in terms of colon cancer screening.  She will be due for surveillance colonoscopy in 2029.    Past Medical History:   Diagnosis Date   • Anxiety    • Asthma    • Bladder disorder    • Diabetes mellitus (CMS/HCC)    • HLD (hyperlipidemia) 9/27/2019   • Hyperlipidemia    • Hypothyroidism    • Intractable nausea and vomiting 9/27/2019   • MRSA (methicillin resistant Staphylococcus aureus) 2010    spider bite - blood    • Thyroid disease        Past Surgical History:   Procedure Laterality Date   • BLADDER SURGERY     • CHOLECYSTECTOMY WITH INTRAOPERATIVE CHOLANGIOGRAM N/A 9/30/2019    Procedure: CHOLECYSTECTOMY LAPAROSCOPIC INTRAOPERATIVE CHOLANGIOGRAM;  Surgeon: Jonathan Tiwari Jr., MD;  Location: [facility] MAIN OR;  Service: General   • COLONOSCOPY W/ POLYPECTOMY N/A 2/21/2019    Procedure: COLONOSCOPY TO CECUM;  Surgeon: Rony Trimble MD;  Location: [facility] ENDOSCOPY;  Service: Gastroenterology   • ENDOSCOPY N/A 2/21/2019    Procedure: ESOPHAGOGASTRODUODENOSCOPY WITH BIOPSY;  Surgeon: Rony Trimble MD;  Location: [facility] ENDOSCOPY;  Service: Gastroenterology   • ENDOSCOPY N/A 9/27/2019    Procedure: ESOPHAGOGASTRODUODENOSCOPY WITH BIOPSIES;  Surgeon: Rony Barakat MD;  Location: [facility] ENDOSCOPY;  Service: Gastroenterology   • EXPLORATORY LAPAROTOMY     • PARTIAL HYSTERECTOMY     • TONSILLECTOMY          Scheduled Meds:  Outpatient Encounter Medications as of 10/10/2019   Medication Sig Dispense Refill   • atorvastatin (LIPITOR) 20 MG tablet TAKE 1 TABLET BY MOUTH ONE TIME A DAY  90 tablet 1   • budesonide-formoterol (SYMBICORT) 160-4.5 MCG/ACT inhaler Inhale 2 puffs 2 (Two) Times a Day. 1 inhaler 12   • busPIRone (BUSPAR) 10 MG tablet TAKE ONE TABLET BY MOUTH TWICE A DAY 60 tablet 4   • escitalopram (LEXAPRO) 20 MG tablet TAKE ONE TABLET BY MOUTH DAILY 30 tablet 3   • HUMALOG KWIKPEN 100 UNIT/ML solution pen-injector ss     • levothyroxine (SYNTHROID, LEVOTHROID) 150 MCG tablet TAKE ONE TABLET BY MOUTH DAILY 30 tablet 0   • levothyroxine (SYNTHROID, LEVOTHROID) 175 MCG tablet Take 1 tablet by mouth Daily. 30 tablet 0   • loratadine (CLARITIN) 10 MG tablet Take 1 tablet by mouth daily. 30 tablet 5   • metFORMIN (GLUCOPHAGE) 500 MG tablet TAKE 2 TABLETS BY MOUTH TWO TIMES A DAY WITH MEALS 120 tablet 5   • Semaglutide (OZEMPIC SC) Inject 5 Units/day under the skin. weekly     • VESICARE 5 MG tablet      • [DISCONTINUED] pantoprazole (PROTONIX) 40 MG EC tablet Take 1 tablet by mouth 2 (Two) Times a Day Before Meals. 60 tablet 0   • [] HYDROcodone-acetaminophen (NORCO) 5-325 MG per tablet Take 1 tablet by mouth Every 6 (Six) Hours As Needed for Moderate Pain  for up to 8 days. 8 tablet 0   • pantoprazole (PROTONIX) 40 MG EC tablet Take 1 tablet by mouth 2 (Two) Times a Day. 180 tablet 3   • promethazine (PHENERGAN) 12.5 MG tablet Take 1 tablet by mouth Every 6 (Six) Hours As Needed for Nausea or Vomiting for up to 5 doses. 5 tablet 0   • promethazine (PHENERGAN) 25 MG tablet Take 1 tablet by mouth Every 6 (Six) Hours As Needed for Nausea. 20 tablet 0   • sucralfate (CARAFATE) 1 GM/10ML suspension Take 10 mL by mouth 2 (Two) Times a Day. 420 mL 2   • traMADol (ULTRAM) 50 MG tablet Take 1 tablet by mouth Every 4 (Four) Hours As Needed for Moderate Pain . 15 tablet 0   • [DISCONTINUED] sucralfate (CARAFATE) 1  GM/10ML suspension Take 10 mL by mouth 4 (Four) Times a Day Before Meals & at Bedtime. 600 mL 2     No facility-administered encounter medications on file as of 10/10/2019.        Continuous Infusions:  No current facility-administered medications for this visit.     PRN Meds:.    Allergies   Allergen Reactions   • Bactrim [Sulfamethoxazole-Trimethoprim] GI Intolerance       Social History     Socioeconomic History   • Marital status:      Spouse name: Not on file   • Number of children: Not on file   • Years of education: Not on file   • Highest education level: Not on file   Tobacco Use   • Smoking status: Former Smoker     Years: 1.00     Types: Cigarettes   • Smokeless tobacco: Never Used   Substance and Sexual Activity   • Alcohol use: No   • Drug use: No   • Sexual activity: Defer       Family History   Problem Relation Age of Onset   • Diabetes Mother    • Hypertension Sister        Review of Systems   Constitutional: Negative for activity change, appetite change, fatigue, fever and unexpected weight change.   HENT: Negative for trouble swallowing.    Respiratory: Negative for apnea, cough, choking, chest tightness, shortness of breath and wheezing.    Cardiovascular: Negative for chest pain, palpitations and leg swelling.   Gastrointestinal: Negative for abdominal distention, abdominal pain, anal bleeding, blood in stool, constipation, diarrhea, nausea, rectal pain and vomiting.       Vitals:    10/10/19 0908   BP: 128/80   Temp: 97.6 °F (36.4 °C)       Physical Exam   Constitutional: She is oriented to person, place, and time. She appears well-developed and well-nourished.   Eyes: Pupils are equal, round, and reactive to light.   Cardiovascular: Normal rate, regular rhythm and normal heart sounds.   Pulmonary/Chest: Effort normal and breath sounds normal. No respiratory distress. She has no wheezes.   Abdominal: Soft. Bowel sounds are normal. She exhibits no distension and no mass. There is no  tenderness. There is no guarding. No hernia.   Musculoskeletal: Normal range of motion.   Neurological: She is alert and oriented to person, place, and time.   Skin: Skin is warm and dry. Capillary refill takes less than 2 seconds.   Psychiatric: She has a normal mood and affect. Her behavior is normal.       No images are attached to the encounter.    Eunice was seen today for esophagitis.    Diagnoses and all orders for this visit:    Gastroesophageal reflux disease with esophagitis    Other orders  -     sucralfate (CARAFATE) 1 GM/10ML suspension; Take 10 mL by mouth 2 (Two) Times a Day.  -     pantoprazole (PROTONIX) 40 MG EC tablet; Take 1 tablet by mouth 2 (Two) Times a Day.      Assessment/plan    Pleasant 58-year-old female seen today in follow-up for recent hospitalization at which time she underwent cholecystectomy and was found to have esophagitis on upper endoscopy.  She is much improved on visit today.  I did recommend that she start pantoprazole 40 mg twice daily until scope. Carafate twice daily for 1 month.  Keep previously scheduled EGD in December to reassess esophagitis.  Follow-up and further recommendations pending upper endoscopy.  Patient to call us with any questions or concerns in the interim.     (I spent a total of 30 minutes in direct patient care including face-to-face examination. 25 minutes were spent in coordination of care and counseling the patient extensively on recent endoscopic findings, medication adherence and the importance of follow-up EGD.)

## 2019-10-11 ENCOUNTER — READMISSION MANAGEMENT (OUTPATIENT)
Dept: CALL CENTER | Facility: HOSPITAL | Age: 59
End: 2019-10-11

## 2019-10-11 NOTE — OUTREACH NOTE
General Surgery Week 2 Survey      Responses   Facility patient discharged from?  [facility]   Does the patient have one of the following disease processes/diagnoses(primary or secondary)?  General Surgery   Week 2 attempt successful?  No   Unsuccessful attempts  Attempt 1          Domenica Reyes RN

## 2019-10-14 ENCOUNTER — PRIOR AUTHORIZATION (OUTPATIENT)
Dept: GASTROENTEROLOGY | Facility: CLINIC | Age: 59
End: 2019-10-14

## 2019-10-14 ENCOUNTER — READMISSION MANAGEMENT (OUTPATIENT)
Dept: CALL CENTER | Facility: HOSPITAL | Age: 59
End: 2019-10-14

## 2019-10-14 NOTE — OUTREACH NOTE
General Surgery Week 2 Survey      Responses   Facility patient discharged from?  [facility]   Does the patient have one of the following disease processes/diagnoses(primary or secondary)?  General Surgery   Week 2 attempt successful?  No   Unsuccessful attempts  Attempt 2          Estrellita Hutchinson RN

## 2019-10-15 ENCOUNTER — OFFICE VISIT (OUTPATIENT)
Dept: SURGERY | Facility: CLINIC | Age: 59
End: 2019-10-15

## 2019-10-15 VITALS — OXYGEN SATURATION: 99 % | WEIGHT: 209 LBS | HEART RATE: 76 BPM | BODY MASS INDEX: 31.32 KG/M2

## 2019-10-15 DIAGNOSIS — Z48.89 POSTOPERATIVE VISIT: Primary | ICD-10-CM

## 2019-10-15 PROCEDURE — 99024 POSTOP FOLLOW-UP VISIT: CPT | Performed by: SURGERY

## 2019-10-15 NOTE — PROGRESS NOTES
Subjective   Eunice Ennis is a 58 y.o. female who returns to the office after undergoing a laparoscopic cholecystectomy with intraoperative cholangiogram on 9/30/2019.     History of Present Illness     The patient is recovering well with no significant postop symptoms.  She is having no abdominal pain.  She has a good appetite and normal bowel function.  Her energy level is good.      Review of Systems   Constitutional: Negative for activity change, appetite change, fatigue and fever.   Respiratory: Negative for chest tightness and shortness of breath.    Cardiovascular: Negative for chest pain and palpitations.   Gastrointestinal: Negative for abdominal pain, constipation, diarrhea and nausea.   Skin: Negative for rash and wound.   Psychiatric/Behavioral: Negative for agitation and confusion.       Objective   Physical Exam   Constitutional:  Non-toxic appearance. She does not appear ill. No distress.   Pulmonary/Chest: Effort normal. No respiratory distress.   Abdominal: Soft. Normal appearance. There is no tenderness.   Neurological: She is alert.   Skin:   Incision: intact with no evidence of infection.   Psychiatric: She has a normal mood and affect. Her behavior is normal.       Assessment/Plan   The encounter diagnosis was Postoperative visit.    The patient is recovering well from her laparoscopic cholecystectomy with intraoperative cholangiogram.  At this point she has no limitations.  She was given a release to return to work.  She will follow-up on an as-needed basis.

## 2019-10-17 ENCOUNTER — TELEPHONE (OUTPATIENT)
Dept: GASTROENTEROLOGY | Facility: CLINIC | Age: 59
End: 2019-10-17

## 2019-10-17 NOTE — TELEPHONE ENCOUNTER
----- Message from Rony Barakat MD sent at 9/30/2019  1:52 PM EDT -----  Pathology benign, continue twice daily PPI and Carafate  Office visit 4 weeks with Esther to schedule repeat EGD in 3 months

## 2019-10-19 ENCOUNTER — APPOINTMENT (OUTPATIENT)
Dept: MAMMOGRAPHY | Facility: HOSPITAL | Age: 59
End: 2019-10-19

## 2019-11-04 RX ORDER — LEVOTHYROXINE SODIUM 0.15 MG/1
TABLET ORAL
Qty: 30 TABLET | Refills: 0 | Status: SHIPPED | OUTPATIENT
Start: 2019-11-04 | End: 2019-12-13 | Stop reason: SDUPTHER

## 2019-11-18 RX ORDER — BUSPIRONE HYDROCHLORIDE 10 MG/1
TABLET ORAL
Qty: 60 TABLET | Refills: 3 | Status: SHIPPED | OUTPATIENT
Start: 2019-11-18 | End: 2020-07-13

## 2019-12-08 RX ORDER — SUCRALFATE ORAL 1 G/10ML
1 SUSPENSION ORAL
Qty: 1200 ML | Refills: 0 | Status: SHIPPED | OUTPATIENT
Start: 2019-12-08 | End: 2020-01-07

## 2019-12-13 RX ORDER — LEVOTHYROXINE SODIUM 0.15 MG/1
TABLET ORAL
Qty: 30 TABLET | Refills: 0 | Status: SHIPPED | OUTPATIENT
Start: 2019-12-13 | End: 2020-01-20

## 2020-01-20 RX ORDER — LEVOTHYROXINE SODIUM 0.15 MG/1
TABLET ORAL
Qty: 30 TABLET | Refills: 0 | Status: SHIPPED | OUTPATIENT
Start: 2020-01-20 | End: 2020-02-20

## 2020-02-20 RX ORDER — SUCRALFATE ORAL 1 G/10ML
SUSPENSION ORAL
Qty: 600 EACH | Refills: 1 | Status: SHIPPED | OUTPATIENT
Start: 2020-02-20 | End: 2020-03-30

## 2020-02-20 RX ORDER — LEVOTHYROXINE SODIUM 0.15 MG/1
TABLET ORAL
Qty: 30 TABLET | Refills: 0 | Status: SHIPPED | OUTPATIENT
Start: 2020-02-20 | End: 2020-03-19

## 2020-03-19 RX ORDER — LEVOTHYROXINE SODIUM 0.15 MG/1
TABLET ORAL
Qty: 30 TABLET | Refills: 0 | Status: SHIPPED | OUTPATIENT
Start: 2020-03-19 | End: 2020-04-15

## 2020-03-30 RX ORDER — SUCRALFATE ORAL 1 G/10ML
SUSPENSION ORAL
Qty: 600 EACH | Refills: 0 | Status: SHIPPED | OUTPATIENT
Start: 2020-03-30 | End: 2020-04-14

## 2020-04-14 RX ORDER — SUCRALFATE ORAL 1 G/10ML
SUSPENSION ORAL
Qty: 600 EACH | Refills: 0 | Status: SHIPPED | OUTPATIENT
Start: 2020-04-14 | End: 2022-01-10

## 2020-04-15 RX ORDER — LEVOTHYROXINE SODIUM 0.15 MG/1
TABLET ORAL
Qty: 30 TABLET | Refills: 0 | Status: SHIPPED | OUTPATIENT
Start: 2020-04-15 | End: 2020-05-11

## 2020-04-27 RX ORDER — SUCRALFATE ORAL 1 G/10ML
SUSPENSION ORAL
Qty: 600 EACH | Refills: 0 | OUTPATIENT
Start: 2020-04-27

## 2020-05-11 ENCOUNTER — TRANSCRIBE ORDERS (OUTPATIENT)
Dept: ADMINISTRATIVE | Facility: HOSPITAL | Age: 60
End: 2020-05-11

## 2020-05-11 DIAGNOSIS — Z12.31 SCREENING MAMMOGRAM, ENCOUNTER FOR: Primary | ICD-10-CM

## 2020-05-11 RX ORDER — LEVOTHYROXINE SODIUM 0.15 MG/1
TABLET ORAL
Qty: 30 TABLET | Refills: 0 | Status: SHIPPED | OUTPATIENT
Start: 2020-05-11 | End: 2020-06-12

## 2020-06-12 RX ORDER — LEVOTHYROXINE SODIUM 0.15 MG/1
TABLET ORAL
Qty: 30 TABLET | Refills: 0 | Status: SHIPPED | OUTPATIENT
Start: 2020-06-12 | End: 2020-07-15

## 2020-06-13 NOTE — TELEPHONE ENCOUNTER
rx sent to pharm  Lm informing    ----- Message from Berkley Chang sent at 2/13/2017  1:27 PM EST -----  Contact: PATIENT [phone]  PATIENT NEEDS invokana 100 MG  ONCE DAILY CALLED INTO PHARMACY     [facility] [address] AT [facility] & ([address]) - [phone]  - [phone] (Phone)        
No

## 2020-07-13 RX ORDER — BUSPIRONE HYDROCHLORIDE 10 MG/1
TABLET ORAL
Qty: 60 TABLET | Refills: 2 | Status: SHIPPED | OUTPATIENT
Start: 2020-07-13 | End: 2021-02-15 | Stop reason: SDUPTHER

## 2020-07-13 RX ORDER — ESCITALOPRAM OXALATE 20 MG/1
TABLET ORAL
Qty: 30 TABLET | Refills: 2 | Status: SHIPPED | OUTPATIENT
Start: 2020-07-13 | End: 2021-01-04

## 2020-07-15 RX ORDER — LEVOTHYROXINE SODIUM 0.15 MG/1
TABLET ORAL
Qty: 30 TABLET | Refills: 0 | Status: SHIPPED | OUTPATIENT
Start: 2020-07-15 | End: 2020-08-10

## 2020-08-10 RX ORDER — LEVOTHYROXINE SODIUM 0.15 MG/1
TABLET ORAL
Qty: 30 TABLET | Refills: 0 | Status: SHIPPED | OUTPATIENT
Start: 2020-08-10 | End: 2022-01-10

## 2020-10-23 ENCOUNTER — APPOINTMENT (OUTPATIENT)
Dept: MAMMOGRAPHY | Facility: HOSPITAL | Age: 60
End: 2020-10-23

## 2020-10-27 ENCOUNTER — HOSPITAL ENCOUNTER (EMERGENCY)
Facility: HOSPITAL | Age: 60
Discharge: HOME | End: 2020-10-27
Attending: EMERGENCY MEDICINE | Admitting: EMERGENCY MEDICINE

## 2020-10-27 ENCOUNTER — APPOINTMENT (OUTPATIENT)
Dept: CT IMAGING | Facility: HOSPITAL | Age: 60
End: 2020-10-27

## 2020-10-27 VITALS
HEIGHT: 68 IN | RESPIRATION RATE: 18 BRPM | OXYGEN SATURATION: 97 % | HEART RATE: 90 BPM | DIASTOLIC BLOOD PRESSURE: 76 MMHG | BODY MASS INDEX: 32.58 KG/M2 | WEIGHT: 215 LBS | SYSTOLIC BLOOD PRESSURE: 131 MMHG | TEMPERATURE: 96.6 F

## 2020-10-27 DIAGNOSIS — K52.9 COLITIS: Primary | ICD-10-CM

## 2020-10-27 LAB
ALBUMIN SERPL-MCNC: 4 G/DL (ref 3.5–5.2)
ALBUMIN/GLOB SERPL: 1.2 G/DL
ALP SERPL-CCNC: 75 U/L (ref 39–117)
ALT SERPL W P-5'-P-CCNC: 25 U/L (ref 1–33)
ANION GAP SERPL CALCULATED.3IONS-SCNC: 9.2 MMOL/L (ref 5–15)
AST SERPL-CCNC: 19 U/L (ref 1–32)
BACTERIA UR QL AUTO: ABNORMAL /HPF
BASOPHILS # BLD AUTO: 0.02 10*3/MM3 (ref 0–0.2)
BASOPHILS NFR BLD AUTO: 0.2 % (ref 0–1.5)
BILIRUB SERPL-MCNC: 0.3 MG/DL (ref 0–1.2)
BILIRUB UR QL STRIP: NEGATIVE
BUN SERPL-MCNC: 14 MG/DL (ref 6–20)
BUN/CREAT SERPL: 23.7 (ref 7–25)
CALCIUM SPEC-SCNC: 8.8 MG/DL (ref 8.6–10.5)
CHLORIDE SERPL-SCNC: 99 MMOL/L (ref 98–107)
CLARITY UR: CLEAR
CO2 SERPL-SCNC: 27.8 MMOL/L (ref 22–29)
COLOR UR: YELLOW
CREAT SERPL-MCNC: 0.59 MG/DL (ref 0.57–1)
DEPRECATED RDW RBC AUTO: 41.7 FL (ref 37–54)
EOSINOPHIL # BLD AUTO: 0.01 10*3/MM3 (ref 0–0.4)
EOSINOPHIL NFR BLD AUTO: 0.1 % (ref 0.3–6.2)
ERYTHROCYTE [DISTWIDTH] IN BLOOD BY AUTOMATED COUNT: 12.6 % (ref 12.3–15.4)
GFR SERPL CREATININE-BSD FRML MDRD: 105 ML/MIN/1.73
GLOBULIN UR ELPH-MCNC: 3.4 GM/DL
GLUCOSE SERPL-MCNC: 132 MG/DL (ref 65–99)
GLUCOSE UR STRIP-MCNC: ABNORMAL MG/DL
HCT VFR BLD AUTO: 43.1 % (ref 34–46.6)
HGB BLD-MCNC: 14.4 G/DL (ref 12–15.9)
HGB UR QL STRIP.AUTO: NEGATIVE
HOLD SPECIMEN: NORMAL
HOLD SPECIMEN: NORMAL
HYALINE CASTS UR QL AUTO: ABNORMAL /LPF
IMM GRANULOCYTES # BLD AUTO: 0.08 10*3/MM3 (ref 0–0.05)
IMM GRANULOCYTES NFR BLD AUTO: 0.8 % (ref 0–0.5)
KETONES UR QL STRIP: ABNORMAL
LEUKOCYTE ESTERASE UR QL STRIP.AUTO: ABNORMAL
LIPASE SERPL-CCNC: 15 U/L (ref 13–60)
LYMPHOCYTES # BLD AUTO: 1.08 10*3/MM3 (ref 0.7–3.1)
LYMPHOCYTES NFR BLD AUTO: 11.2 % (ref 19.6–45.3)
MCH RBC QN AUTO: 30.2 PG (ref 26.6–33)
MCHC RBC AUTO-ENTMCNC: 33.4 G/DL (ref 31.5–35.7)
MCV RBC AUTO: 90.4 FL (ref 79–97)
MONOCYTES # BLD AUTO: 0.2 10*3/MM3 (ref 0.1–0.9)
MONOCYTES NFR BLD AUTO: 2.1 % (ref 5–12)
NEUTROPHILS NFR BLD AUTO: 8.27 10*3/MM3 (ref 1.7–7)
NEUTROPHILS NFR BLD AUTO: 85.6 % (ref 42.7–76)
NITRITE UR QL STRIP: NEGATIVE
NRBC BLD AUTO-RTO: 0 /100 WBC (ref 0–0.2)
PH UR STRIP.AUTO: 6 [PH] (ref 5–8)
PLATELET # BLD AUTO: 228 10*3/MM3 (ref 140–450)
PMV BLD AUTO: 9.2 FL (ref 6–12)
POTASSIUM SERPL-SCNC: 3.9 MMOL/L (ref 3.5–5.2)
PROT SERPL-MCNC: 7.4 G/DL (ref 6–8.5)
PROT UR QL STRIP: NEGATIVE
RBC # BLD AUTO: 4.77 10*6/MM3 (ref 3.77–5.28)
RBC # UR: ABNORMAL /HPF
REF LAB TEST METHOD: ABNORMAL
SODIUM SERPL-SCNC: 136 MMOL/L (ref 136–145)
SP GR UR STRIP: 1.02 (ref 1–1.03)
SQUAMOUS #/AREA URNS HPF: ABNORMAL /HPF
UROBILINOGEN UR QL STRIP: ABNORMAL
WBC # BLD AUTO: 9.66 10*3/MM3 (ref 3.4–10.8)
WBC UR QL AUTO: ABNORMAL /HPF
WHOLE BLOOD HOLD SPECIMEN: NORMAL
WHOLE BLOOD HOLD SPECIMEN: NORMAL

## 2020-10-27 PROCEDURE — 25010000002 ONDANSETRON PER 1 MG: Performed by: NURSE PRACTITIONER

## 2020-10-27 PROCEDURE — 74177 CT ABD & PELVIS W/CONTRAST: CPT

## 2020-10-27 PROCEDURE — 96374 THER/PROPH/DIAG INJ IV PUSH: CPT

## 2020-10-27 PROCEDURE — 83690 ASSAY OF LIPASE: CPT | Performed by: EMERGENCY MEDICINE

## 2020-10-27 PROCEDURE — 81001 URINALYSIS AUTO W/SCOPE: CPT | Performed by: EMERGENCY MEDICINE

## 2020-10-27 PROCEDURE — 85025 COMPLETE CBC W/AUTO DIFF WBC: CPT | Performed by: EMERGENCY MEDICINE

## 2020-10-27 PROCEDURE — 25010000002 IOPAMIDOL 61 % SOLUTION: Performed by: EMERGENCY MEDICINE

## 2020-10-27 PROCEDURE — 99284 EMERGENCY DEPT VISIT MOD MDM: CPT

## 2020-10-27 PROCEDURE — 80053 COMPREHEN METABOLIC PANEL: CPT | Performed by: EMERGENCY MEDICINE

## 2020-10-27 RX ORDER — ONDANSETRON 2 MG/ML
4 INJECTION INTRAMUSCULAR; INTRAVENOUS ONCE
Status: COMPLETED | OUTPATIENT
Start: 2020-10-27 | End: 2020-10-27

## 2020-10-27 RX ORDER — SODIUM CHLORIDE 0.9 % (FLUSH) 0.9 %
10 SYRINGE (ML) INJECTION AS NEEDED
Status: DISCONTINUED | OUTPATIENT
Start: 2020-10-27 | End: 2020-10-27 | Stop reason: HOSPADM

## 2020-10-27 RX ORDER — ONDANSETRON 4 MG/1
4 TABLET, ORALLY DISINTEGRATING ORAL EVERY 6 HOURS PRN
Qty: 15 TABLET | Refills: 0 | Status: SHIPPED | OUTPATIENT
Start: 2020-10-27 | End: 2022-07-12

## 2020-10-27 RX ORDER — DICYCLOMINE HYDROCHLORIDE 10 MG/1
10 CAPSULE ORAL
Qty: 30 CAPSULE | Refills: 0 | Status: SHIPPED | OUTPATIENT
Start: 2020-10-27 | End: 2022-07-12

## 2020-10-27 RX ORDER — METRONIDAZOLE 250 MG/1
250 TABLET ORAL 3 TIMES DAILY
Qty: 30 TABLET | Refills: 0 | Status: SHIPPED | OUTPATIENT
Start: 2020-10-27 | End: 2022-01-10

## 2020-10-27 RX ADMIN — SODIUM CHLORIDE 1000 ML: 9 INJECTION, SOLUTION INTRAVENOUS at 06:35

## 2020-10-27 RX ADMIN — IOPAMIDOL 85 ML: 612 INJECTION, SOLUTION INTRAVENOUS at 08:21

## 2020-10-27 RX ADMIN — ONDANSETRON 4 MG: 2 INJECTION INTRAMUSCULAR; INTRAVENOUS at 06:34

## 2020-10-27 NOTE — ED NOTES
Patient was placed in face mask during first look triage.  Patient was wearing a face mask throughout encounter.  I wore personal protective equipment throughout the encounter.  Hand hygiene was performed before and after patient encounter.        Renée Irene RN  10/27/20 0612

## 2020-10-27 NOTE — ED NOTES
Pt presents to ED via POV from home w/cc vomiting and diarrhea.  Pt states she hasn't been able to keep anything down and has been vomiting all day, states she has been experiencing diarrhea for 2 days.     Pt Hx DMII  Pt denies any fevers, dizziness, cp, soa.  Pt masked at triage  Triage completed with appropriate PPE     Olya Bautista, RN  10/27/20 2973

## 2020-10-27 NOTE — ED PROVIDER NOTES
Pt presents to the ED c/o  abdominal pain with nausea vomiting that started last night.  She states her diarrhea is green and loose.  She has noticed no blood or mucus in her stool.  No fevers or chills.  No history of inflammatory bowel disease.  No recent antibiotic use.     On exam,   Awake, alert, no acute distress  -Soft, nondistended nontender.  There is no rebound or guarding.     Plan: CBC is normal.  CMP is normal.  CT scan shows colitis.  The patient will be prescribed Flagyl and an antiemetic.  I explained a low fiber diet to her.  She will need to follow-up with her primary care doctor or return to the emergency department if symptoms worsen.      PPE was worn by myself and the patient DrSukhi Entire interaction.       Attestation:  The MARIANA and I have discussed this patient's history, physical exam, and treatment plan.  I have reviewed the documentation and personally had a face to face interaction with the patient. I affirm the documentation and agree with the treatment and plan.  The attached note describes my personal findings.            Julio Pittman MD  10/27/20 1680

## 2020-10-27 NOTE — ED PROVIDER NOTES
[facility] EMERGENCY DEPARTMENT ENCOUNTER    Room Number:  13/13  Date of encounter:  10/27/2020  PCP: Manny Lyn Jr., MD  Historian: Patient and       PPE    Patient was placed in face mask in first look. Patient was wearing facemask when I entered the room and throughout our encounter. I wore full protective equipment throughout this patient encounter including a face mask, and gloves. Hand hygiene was performed before donning protective equipment and after removal when leaving the room.        HPI:  Chief Complaint: Nausea and vomiting  A complete HPI/ROS/PMH/PSH/SH/FH are unobtainable due to: Nothing    Context: Eunice Ennis is a 59 y.o. female who arrives to the ED via private vehicle from home with her .  Patient presents with c/o moderate, constant, diffuse abdominal pain that began last night.   Patient also complains of nausea, vomiting, diarrhea.  Patient denies fever, chills, cough, chest pain, shortness of breath, dysuria.  Patient states that nothing makes the symptoms better and nothing worsens symptoms.  Patient denies being a smoker or drinking alcohol.        PAST MEDICAL HISTORY  Active Ambulatory Problems     Diagnosis Date Noted   • Hypothyroid 07/01/2016   • Diabetes mellitus (CMS/HCC) 07/01/2016   • Constipation 02/05/2019   • Dyspepsia 02/05/2019   • Esophagitis 03/19/2019   • Bacterial infection due to Helicobacter pylori 03/19/2019   • Intractable nausea and vomiting 09/27/2019   • HLD (hyperlipidemia) 09/27/2019   • Intractable vomiting with nausea 09/27/2019   • Biliary dyskinesia 09/27/2019   • Early satiety 10/09/2019     Resolved Ambulatory Problems     Diagnosis Date Noted   • No Resolved Ambulatory Problems     Past Medical History:   Diagnosis Date   • Anxiety    • Asthma    • Bladder disorder    • Hyperlipidemia    • Hypothyroidism    • MRSA (methicillin resistant Staphylococcus aureus) 2010   • Thyroid disease          PAST SURGICAL HISTORY  Past Surgical History:    Procedure Laterality Date   • BLADDER SURGERY     • CHOLECYSTECTOMY     • CHOLECYSTECTOMY WITH INTRAOPERATIVE CHOLANGIOGRAM N/A 9/30/2019    Procedure: CHOLECYSTECTOMY LAPAROSCOPIC INTRAOPERATIVE CHOLANGIOGRAM;  Surgeon: Jonathan Tiwari Jr., MD;  Location: [facility] MAIN OR;  Service: General   • COLONOSCOPY W/ POLYPECTOMY N/A 2/21/2019    Procedure: COLONOSCOPY TO CECUM;  Surgeon: Rony Trimble MD;  Location: [facility] ENDOSCOPY;  Service: Gastroenterology   • ENDOSCOPY N/A 2/21/2019    Procedure: ESOPHAGOGASTRODUODENOSCOPY WITH BIOPSY;  Surgeon: Rony Trimble MD;  Location: [facility] ENDOSCOPY;  Service: Gastroenterology   • ENDOSCOPY N/A 9/27/2019    Procedure: ESOPHAGOGASTRODUODENOSCOPY WITH BIOPSIES;  Surgeon: Rony Barakat MD;  Location: [facility] ENDOSCOPY;  Service: Gastroenterology   • EXPLORATORY LAPAROTOMY     • OOPHORECTOMY     • SUBTOTAL HYSTERECTOMY     • TONSILLECTOMY           FAMILY HISTORY  Family History   Problem Relation Age of Onset   • Diabetes Mother    • Hypertension Sister          SOCIAL HISTORY  Social History     Socioeconomic History   • Marital status:      Spouse name: Not on file   • Number of children: Not on file   • Years of education: Not on file   • Highest education level: Not on file   Tobacco Use   • Smoking status: Former Smoker     Years: 1.00     Types: Cigarettes   • Smokeless tobacco: Never Used   Substance and Sexual Activity   • Alcohol use: No   • Drug use: No   • Sexual activity: Defer         ALLERGIES  Bactrim [sulfamethoxazole-trimethoprim]        REVIEW OF SYSTEMS  Review of Systems     All systems reviewed and negative except for those discussed in HPI.        PHYSICAL EXAM    ED Triage Vitals   Temp Heart Rate Resp BP SpO2   10/27/20 0529 10/27/20 0529 10/27/20 0529 10/27/20 0544 10/27/20 0529   96.6 °F (35.9 °C) 90 18 157/73 99 %       Physical Exam  GENERAL: Appears older than stated age, non-toxic appearing, not distressed  HENT:  normocephalic, atraumatic, dry mucous membranes  EYES: no scleral icterus, PERRL  CV: regular rhythm, regular rate, no murmur  RESPIRATORY: normal effort, CTAB  ABDOMEN: soft, diffuse tenderness, normal bowel sounds, no rebound, guarding or rigidity  MUSCULOSKELETAL: no deformity  NEURO: alert, moves all extremities, follows commands, mental status normal/baseline  SKIN: warm, dry, no rash   Psych: Appropriate mood and affect  Nursing notes and vital signs reviewed      LAB RESULTS  Recent Results (from the past 24 hour(s))   Comprehensive Metabolic Panel    Collection Time: 10/27/20  6:52 AM    Specimen: Blood   Result Value Ref Range    Glucose 132 (H) 65 - 99 mg/dL    BUN 14 6 - 20 mg/dL    Creatinine 0.59 0.57 - 1.00 mg/dL    Sodium 136 136 - 145 mmol/L    Potassium 3.9 3.5 - 5.2 mmol/L    Chloride 99 98 - 107 mmol/L    CO2 27.8 22.0 - 29.0 mmol/L    Calcium 8.8 8.6 - 10.5 mg/dL    Total Protein 7.4 6.0 - 8.5 g/dL    Albumin 4.00 3.50 - 5.20 g/dL    ALT (SGPT) 25 1 - 33 U/L    AST (SGOT) 19 1 - 32 U/L    Alkaline Phosphatase 75 39 - 117 U/L    Total Bilirubin 0.3 0.0 - 1.2 mg/dL    eGFR Non African Amer 105 >60 mL/min/1.73    Globulin 3.4 gm/dL    A/G Ratio 1.2 g/dL    BUN/Creatinine Ratio 23.7 7.0 - 25.0    Anion Gap 9.2 5.0 - 15.0 mmol/L   Lipase    Collection Time: 10/27/20  6:52 AM    Specimen: Blood   Result Value Ref Range    Lipase 15 13 - 60 U/L   Light Blue Top    Collection Time: 10/27/20  6:52 AM   Result Value Ref Range    Extra Tube hold for add-on    Green Top (Gel)    Collection Time: 10/27/20  6:52 AM   Result Value Ref Range    Extra Tube Hold for add-ons.    Lavender Top    Collection Time: 10/27/20  6:52 AM   Result Value Ref Range    Extra Tube hold for add-on    Gold Top - SST    Collection Time: 10/27/20  6:52 AM   Result Value Ref Range    Extra Tube Hold for add-ons.    CBC Auto Differential    Collection Time: 10/27/20  6:52 AM    Specimen: Blood   Result Value Ref Range    WBC 9.66 3.40  - 10.80 10*3/mm3    RBC 4.77 3.77 - 5.28 10*6/mm3    Hemoglobin 14.4 12.0 - 15.9 g/dL    Hematocrit 43.1 34.0 - 46.6 %    MCV 90.4 79.0 - 97.0 fL    MCH 30.2 26.6 - 33.0 pg    MCHC 33.4 31.5 - 35.7 g/dL    RDW 12.6 12.3 - 15.4 %    RDW-SD 41.7 37.0 - 54.0 fl    MPV 9.2 6.0 - 12.0 fL    Platelets 228 140 - 450 10*3/mm3    Neutrophil % 85.6 (H) 42.7 - 76.0 %    Lymphocyte % 11.2 (L) 19.6 - 45.3 %    Monocyte % 2.1 (L) 5.0 - 12.0 %    Eosinophil % 0.1 (L) 0.3 - 6.2 %    Basophil % 0.2 0.0 - 1.5 %    Immature Grans % 0.8 (H) 0.0 - 0.5 %    Neutrophils, Absolute 8.27 (H) 1.70 - 7.00 10*3/mm3    Lymphocytes, Absolute 1.08 0.70 - 3.10 10*3/mm3    Monocytes, Absolute 0.20 0.10 - 0.90 10*3/mm3    Eosinophils, Absolute 0.01 0.00 - 0.40 10*3/mm3    Basophils, Absolute 0.02 0.00 - 0.20 10*3/mm3    Immature Grans, Absolute 0.08 (H) 0.00 - 0.05 10*3/mm3    nRBC 0.0 0.0 - 0.2 /100 WBC   Urinalysis With Microscopic If Indicated (No Culture) - Urine, Clean Catch    Collection Time: 10/27/20  8:05 AM    Specimen: Urine, Clean Catch   Result Value Ref Range    Color, UA Yellow Yellow, Straw    Appearance, UA Clear Clear    pH, UA 6.0 5.0 - 8.0    Specific Gravity, UA 1.019 1.005 - 1.030    Glucose,  mg/dL (Trace) (A) Negative    Ketones, UA 15 mg/dL (1+) (A) Negative    Bilirubin, UA Negative Negative    Blood, UA Negative Negative    Protein, UA Negative Negative    Leuk Esterase, UA Small (1+) (A) Negative    Nitrite, UA Negative Negative    Urobilinogen, UA 0.2 E.U./dL 0.2 - 1.0 E.U./dL   Urinalysis, Microscopic Only - Urine, Clean Catch    Collection Time: 10/27/20  8:05 AM    Specimen: Urine, Clean Catch   Result Value Ref Range    RBC, UA 0-2 None Seen, 0-2 /HPF    WBC, UA 13-20 (A) None Seen, 0-2 /HPF    Bacteria, UA 2+ (A) None Seen /HPF    Squamous Epithelial Cells, UA 13-20 (A) None Seen, 0-2 /HPF    Hyaline Casts, UA 3-6 None Seen /LPF    Methodology Automated Microscopy        Ordered the above labs and independently  reviewed the results.      RADIOLOGY  Ct Abdomen Pelvis With Contrast    Result Date: 10/27/2020  CT ABDOMEN AND PELVIS WITH CONTRAST  HISTORY: Diffuse abdominal pain with vomiting and diarrhea.  TECHNIQUE: Axial CT images of the abdomen and pelvis were obtained following administration of intravenous contrast. The patient was not given oral contrast Coronal and sagittal reformats were obtained.  COMPARISON: 09/24/2019  FINDINGS: The liver demonstrates normal attenuation. No focal hepatic lesions or intrahepatic biliary dilatation. The gallbladder is surgically absent. The spleen is normal. The pancreas is normal without ductal dilatation. Bilateral adrenal glands are normal. Both kidneys are normal in size and attenuation. No renal calculi or hydronephrosis. The urinary bladder is partially distended and normal. The uterus is not identified and is likely surgically absent. The small and large bowel loops demonstrate normal caliber. The appendix is normal. Mild distal esophageal wall thickening suggestive of esophagitis. Mild wall thickening of the stomach may represent gastritis. There is mild wall thickening involving parts of the colon with increased pericolonic vascularity, particularly prominent in the rectosigmoid. There are small subcentimeter retroperitoneal lymph nodes present without pathological enlargement. No acute osseous abnormality.      1. Mild colitis is suspected particularly involving the rectosigmoid colon. 2. CT findings suggesting distal esophagitis and gastritis.  These findings were discussed with Sara Fuentes by telephone.  Radiation dose reduction techniques were utilized, including automated exposure control and exposure modulation based on body size.         I ordered the above noted radiological studies and viewed the images on the PACS system.         MEDICAL RECORD REVIEW  Medical records reviewed in epic, patient was last admitted September 2019 for intractable nausea and  vomiting, diabetes      PROCEDURES    Procedures        DIFFERENTIAL DIAGNOSIS  Differential diagnosis include but are not limited to the following: Colitis, intractable nausea and vomiting, DKA, dehydration, electrolyte abnormality      PROGRESS, DATA ANALYSIS, CONSULTS, AND MEDICAL DECISION MAKING        ED Course as of Oct 27 1029   Tue Oct 27, 2020   0657 Discussed pertinent information from history and physical exam with patient.  Discussed differential diagnosis and plan for ED evaluation/work-up and treatment including labs, urine, CT abdomen and pelvis to rule out intra-abdominal abnormality, electrolyte abnormality or any signs of infection.  All questions answered.  Patient is agreeable with this plan.        [MS]   0820 Bacteria, UA(!): 2+ [WC]   0820 WBC, UA(!): 13-20 [WC]   0820 Likely contaminated specimen   Squamous Epithelial Cells, UA(!): 13-20 [WC]   0821 Reviewed pt's history and workup with Dr. Pittman.  After a bedside evaluation, they agree with the plan of care.          [MS]   0848  Discussed with Dr. Vargas regarding the CT abdomen and pelvis results which revealed colitis        [MS]   0915 I informed the patient of the results of the testing done here in the ER today.  Patient's repeat exam, test results and history are not concerning for serious etiology of their abdominal pain.  Discussed with patient that we will send her home with a short course of antibiotics secondary to the colitis seen on her CT scan today.  I explained to the patient the initial treatment plan and I instructed the patient to return to the emergency department if fever develops, worsening of pain, not able to tolerate liquids or worsening of symptoms.  Instructed the patient to follow-up with her physician or specialist for further evaluation in 2 to 3 days.  The patient understands and agrees with the treatment plan.            [MS]      ED Course User Index  [MS] Sara Fuentes, APRN  [WC] Zain,  MD Julio     Discussed plan for discharge, as there is no emergent indication for admission. Pt/family is agreeable and understands need for follow up and repeat testing.  Pt is aware that discharge does not mean that nothing is wrong but it indicates no emergency is present that requires admission and they must continue care with follow-up as given below or physician of their choice.   Patient/Family voiced understanding of above instructions.  Patient discharged in stable condition.    DIAGNOSIS  Final diagnoses:   Colitis       FOLLOW UP   Manny Lyn Jr., MD  [address] [place] [address]  [phone]    Schedule an appointment as soon as possible for a visit       [facility]  [address]  [facility] [address] [phone]    As needed, If symptoms worsen      RX     Medication List      New Prescriptions    dicyclomine 10 MG capsule  Commonly known as: BENTYL  Take 1 capsule by mouth 4 (Four) Times a Day Before Meals & at Bedtime.     metroNIDAZOLE 250 MG tablet  Commonly known as: FLAGYL  Take 1 tablet by mouth 3 (Three) Times a Day.     ondansetron ODT 4 MG disintegrating tablet  Commonly known as: ZOFRAN-ODT  Place 1 tablet under the tongue Every 6 (Six) Hours As Needed for Nausea or Vomiting.           Where to Get Your Medications      These medications were sent to [facility] [address] [place] [address] [address] [facility] & ([facility] ) - [phone] [facility] [phone]   [address] [place], [place] [id number]    Phone: [phone]   · dicyclomine 10 MG capsule  · metroNIDAZOLE 250 MG tablet  · ondansetron ODT 4 MG disintegrating tablet             MEDICATIONS GIVEN IN ED    Medications   sodium chloride 0.9 % flush 10 mL (has no administration in time range)   sodium chloride 0.9 % flush 10 mL (has no administration in time range)   sodium chloride 0.9 % bolus 1,000 mL (0 mL Intravenous Stopped 10/27/20 0914)    ondansetron (ZOFRAN) injection 4 mg (4 mg Intravenous Given 10/27/20 0634)   iopamidol (ISOVUE-300) 61 % injection 100 mL (85 mL Intravenous Given by Other 10/27/20 6989)           COURSE & MEDICAL DECISION MAKING  Any/All labs and Any/All Imaging studies that were ordered were reviewed and are noted above.  Results were reviewed/discussed with the patient and they were also made aware of online assess.   Pt also made aware that some labs, such as cultures, will not be resulted during ER visit and follow up with PMD is necessary.        Sara Fuentes, APRN  10/27/20 9779

## 2020-10-29 ENCOUNTER — OFFICE VISIT (OUTPATIENT)
Dept: FAMILY MEDICINE CLINIC | Facility: CLINIC | Age: 60
End: 2020-10-29

## 2020-10-29 DIAGNOSIS — Z91.89 AT INCREASED RISK OF EXPOSURE TO COVID-19 VIRUS: ICD-10-CM

## 2020-10-29 DIAGNOSIS — R11.2 NON-INTRACTABLE VOMITING WITH NAUSEA, UNSPECIFIED VOMITING TYPE: ICD-10-CM

## 2020-10-29 DIAGNOSIS — K52.9 COLITIS: Primary | ICD-10-CM

## 2020-10-29 PROCEDURE — 99443 PR PHYS/QHP TELEPHONE EVALUATION 21-30 MIN: CPT | Performed by: INTERNAL MEDICINE

## 2020-10-29 NOTE — PROGRESS NOTES
Subjective Status post ER visit with nausea vomiting diarrhea found to have colitis  Eunice Ennis is a 59 y.o. female.   There is no height or weight on file to calculate BMI. .  History of Present Illness ER visit on 10/27/2020 seen at [facility] ER symptoms beginning day before CAT scan did show colitis in the rectosigmoid colon area was given Flagyl and Zofran is clinically improved he did have some pyuria and is well patient the had a CAT scan done showing the colitis of the rectosigmoid colon area CMP showed glucose 132 but renal and liver enzymes were satisfactory.  CBC White count 9660. You have chosen to receive care through a telephone visit. Do you consent to use a telephone visit for your medical care today? Yes  No [id number]  Much improved now  On flagyl dicyclomine and Zofran  Review of Systems   Gastrointestinal:        Nausea and vomiting much improved.  No blood or mucus seen in stool see present illness       Objective   There were no vitals filed for this visit.      Physical Exam    No results found for: INR    Procedures    Assessment/Plan  This visit has been rescheduled as a phone visit to comply with patient safety concerns in accordance with CDC recommendations. Total time of discussion was 23 minutes.    1.  Colitis status post ER visit on 10/27/2020 patient is much improved taking Flagyl some Zofran and dicyclomine.  Which she takes as needed I will have her follow-up for with us if she is not well at the completion of her Flagyl course of should worsen given the fact she did have some colitis in the rectosigmoid colon area she will need to see Dr. Dorinda Fitch who she is seen before and will initiate that referral.  Follow-up as needed if problems before then and if symptoms should worsen or if patient should relapse    2.  At increased risk of exposure to COVID-19  Patient did ask about a Covid test for family's concern she been exposed in the ER certainly possibility I have asked her  day 5 which would take us till this coming Saturday muscular input order apparently there is a [facility] near her she will call and see if she can make appointment or how they arrange to get that test and there is just a drive-through type service let us know if we need input in order elsewhere.    3.  Nausea vomiting much improved clinically with Zofran dicyclomine plan continue treatment with Flagyl and symptomatic dicyclomine Zofran follow-up if not well in 7 to 10 days antibiotics  if symptoms should worsen or recur keep to GI referral which were initiated  There are no diagnoses linked to this encounter.

## 2020-11-11 ENCOUNTER — OFFICE VISIT (OUTPATIENT)
Dept: GASTROENTEROLOGY | Facility: CLINIC | Age: 60
End: 2020-11-11

## 2020-11-11 VITALS — TEMPERATURE: 98 F | WEIGHT: 220.8 LBS | BODY MASS INDEX: 32.7 KG/M2 | HEIGHT: 69 IN

## 2020-11-11 DIAGNOSIS — K21.00 GASTROESOPHAGEAL REFLUX DISEASE WITH ESOPHAGITIS, UNSPECIFIED WHETHER HEMORRHAGE: ICD-10-CM

## 2020-11-11 DIAGNOSIS — K52.9 COLITIS: ICD-10-CM

## 2020-11-11 DIAGNOSIS — R93.5 ABNORMAL CT OF THE ABDOMEN: Primary | ICD-10-CM

## 2020-11-11 PROCEDURE — 99214 OFFICE O/P EST MOD 30 MIN: CPT | Performed by: NURSE PRACTITIONER

## 2020-11-11 RX ORDER — SUB-Q INSULIN DEVICE, 40 UNIT
EACH MISCELLANEOUS
COMMUNITY
Start: 2020-10-19

## 2020-11-11 RX ORDER — PANTOPRAZOLE SODIUM 40 MG/1
40 TABLET, DELAYED RELEASE ORAL DAILY
Qty: 30 TABLET | Refills: 5 | Status: SHIPPED | OUTPATIENT
Start: 2020-11-11 | End: 2021-12-13

## 2020-11-11 RX ORDER — DAPAGLIFLOZIN AND METFORMIN HYDROCHLORIDE 5; 1000 MG/1; MG/1
TABLET, FILM COATED, EXTENDED RELEASE ORAL
COMMUNITY
Start: 2020-10-15 | End: 2022-12-19

## 2020-11-11 NOTE — PROGRESS NOTES
Chief Complaint   Patient presents with   • ER follow up     HPI    Eunice Ennis is a  59 y.o. female here for a follow up visit for follow-up from ER visit latter part of October.  This is an established patient of Dr. Trimble, known to me. ER evaluation (10/27/2020) reviewed as follows:    HPI:  Chief Complaint: Nausea and vomiting  A complete HPI/ROS/PMH/PSH/SH/FH are unobtainable due to: Nothing     Context: Eunice Ennis is a 59 y.o. female who arrives to the ED via private vehicle from home with her .  Patient presents with c/o moderate, constant, diffuse abdominal pain that began last night.   Patient also complains of nausea, vomiting, diarrhea.  Patient denies fever, chills, cough, chest pain, shortness of breath, dysuria.  Patient states that nothing makes the symptoms better and nothing worsens symptoms.  Patient denies being a smoker or drinking alcohol.    CT abdomen and pelvis with contrast:     Mild colitis is suspected particularly involving the rectosigmoid colon.   CT findings suggesting distal esophagitis and gastritis.    Patient was discharged with Flagyl, Zofran, and Bentyl.   Labs drawn at that time w/ normal hemogram.  Normal LFTs. Normal Lipase.  -------------------------------------------------------------------------------------------------------    Currently she feels quite well.  Abdominal pain completely resolved after course of Flagyl.  Her bowels are moving daily to every other day.  She denies constipation, diarrhea, rectal bleeding.    She is currently off of PPI therapy.  She stopped Carafate shortly after her hospital discharge follow-up last year.  This patient was seen by our service while in the hospital in September 2019 found to have grade D reflux esophagitis and was recommended to have a follow-up EGD 3 months later to assess mucosal healing but failed to follow through.  She currently denies acid reflux or heartburn.  No dysphagia or odynophagia.  No nausea  or vomiting.    Data reviewed:    9/27/2019 EGD with grade D esophagitis.  Repeat 3 months.  2/21/2019 colonoscopy normal.    Cholecystectomy 9/13/2019.  Past Medical History:   Diagnosis Date   • Anxiety    • Asthma    • Bladder disorder    • Diabetes mellitus (CMS/HCC)    • HLD (hyperlipidemia) 9/27/2019   • Hyperlipidemia    • Hypothyroidism    • Intractable nausea and vomiting 9/27/2019   • MRSA (methicillin resistant Staphylococcus aureus) 2010    spider bite - blood    • Thyroid disease        Past Surgical History:   Procedure Laterality Date   • BLADDER SURGERY     • CHOLECYSTECTOMY     • CHOLECYSTECTOMY WITH INTRAOPERATIVE CHOLANGIOGRAM N/A 9/30/2019    Procedure: CHOLECYSTECTOMY LAPAROSCOPIC INTRAOPERATIVE CHOLANGIOGRAM;  Surgeon: Jonathan Tiwari Jr., MD;  Location: [facility] MAIN OR;  Service: General   • COLONOSCOPY W/ POLYPECTOMY N/A 2/21/2019    Procedure: COLONOSCOPY TO CECUM;  Surgeon: Rony Trimble MD;  [facility] END[facility]COPY;  Service: Gastroenterology   • ENDOSCOPY N/A 2/21/2019    Procedure: ESOPHAGOGASTRODUODENOSCOPY WITH BIOPSY;  Surgeon: Rony Trimble MD;  [facility];  Service: Gastroenterology   • ENDOSCOPY N/A 9/27/2019    Procedure: ESOPHAGOGASTRODUODENOSCOPY WITH BIOPSIES;  Surgeon: Rony Barakat MD;  [facility];  Service: Gastroenterology   • EXPLORATORY LAPAROTOMY     • OOPHORECTOMY     • SUBTOTAL HYSTERECTOMY     • TONSILLECTOMY         Scheduled Meds:  Outpatient Encounter Medications as of 11/11/2020   Medication Sig Dispense Refill   • atorvastatin (LIPITOR) 20 MG tablet TAKE 1 TABLET BY MOUTH ONE TIME A DAY  90 tablet 1   • budesonide-formoterol (SYMBICORT) 160-4.5 MCG/ACT inhaler Inhale 2 puffs 2 (Two) Times a Day. 1 inhaler 12   • busPIRone (BUSPAR) 10 MG tablet TAKE ONE TABLET BY MOUTH TWICE A DAY 60 tablet 2   • dicyclomine (BENTYL) 10 MG capsule Take 1 capsule by mouth 4 (Four) Times a Day Before Meals & at Bedtime. 30  capsule 0   • escitalopram (LEXAPRO) 20 MG tablet TAKE ONE TABLET BY MOUTH DAILY 30 tablet 2   • HUMALOG KWIKPEN 100 UNIT/ML solution pen-injector ss     • Insulin Disposable Pump (V-Go 40) kit      • levothyroxine (SYNTHROID, LEVOTHROID) 150 MCG tablet TAKE ONE TABLET BY MOUTH DAILY 30 tablet 0   • loratadine (CLARITIN) 10 MG tablet Take 1 tablet by mouth daily. 30 tablet 5   • ondansetron ODT (ZOFRAN-ODT) 4 MG disintegrating tablet Place 1 tablet under the tongue Every 6 (Six) Hours As Needed for Nausea or Vomiting. 15 tablet 0   • sucralfate (CARAFATE) 1 GM/10ML suspension TAKE TEN MILLILITERS BY MOUTH FOUR TIMES A DAY BEFORE MEALS AND AT BEDTIME *SHAKE WELL BEFORE EACH USE* 600 each 0   • traMADol (ULTRAM) 50 MG tablet Take 1 tablet by mouth Every 4 (Four) Hours As Needed for Moderate Pain . 15 tablet 0   • VESICARE 5 MG tablet      • Xigduo XR 5-1000 MG tablet      • levothyroxine (SYNTHROID, LEVOTHROID) 175 MCG tablet Take 1 tablet by mouth Daily. 30 tablet 0   • metFORMIN (GLUCOPHAGE) 500 MG tablet TAKE 2 TABLETS BY MOUTH TWO TIMES A DAY WITH MEALS 120 tablet 5   • metroNIDAZOLE (FLAGYL) 250 MG tablet Take 1 tablet by mouth 3 (Three) Times a Day. 30 tablet 0   • pantoprazole (PROTONIX) 40 MG EC tablet Take 1 tablet by mouth Daily. 30 tablet 5   • promethazine (PHENERGAN) 12.5 MG tablet Take 1 tablet by mouth Every 6 (Six) Hours As Needed for Nausea or Vomiting for up to 5 doses. 5 tablet 0   • promethazine (PHENERGAN) 25 MG tablet Take 1 tablet by mouth Every 6 (Six) Hours As Needed for Nausea. 20 tablet 0   • Semaglutide (OZEMPIC SC) Inject 5 Units/day under the skin. weekly     • [DISCONTINUED] pantoprazole (PROTONIX) 40 MG EC tablet Take 1 tablet by mouth 2 (Two) Times a Day. 180 tablet 3     No facility-administered encounter medications on file as of 11/11/2020.        Continuous Infusions:No current facility-administered medications for this visit.       PRN Meds:.    Allergies   Allergen Reactions   •  Bactrim [Sulfamethoxazole-Trimethoprim] GI Intolerance   • Sulfa Antibiotics GI Intolerance       Social History     Socioeconomic History   • Marital status:      Spouse name: Not on file   • Number of children: Not on file   • Years of education: Not on file   • Highest education level: Not on file   Tobacco Use   • Smoking status: Former Smoker     Years: 1.00     Types: Cigarettes   • Smokeless tobacco: Never Used   Substance and Sexual Activity   • Alcohol use: No   • Drug use: No   • Sexual activity: Defer       Family History   Problem Relation Age of Onset   • Diabetes Mother    • Hypertension Sister        Review of Systems   Constitutional: Negative for activity change, appetite change, fatigue, fever and unexpected weight change.   HENT: Negative for trouble swallowing.    Respiratory: Negative for apnea, cough, choking, chest tightness, shortness of breath and wheezing.    Cardiovascular: Negative for chest pain, palpitations and leg swelling.   Gastrointestinal: Negative for abdominal distention, abdominal pain, anal bleeding, blood in stool, constipation, diarrhea, nausea, rectal pain and vomiting.       Vitals:    11/11/20 0933   Temp: 98 °F (36.7 °C)       Physical Exam  Constitutional:       Appearance: She is well-developed.   Cardiovascular:      Rate and Rhythm: Normal rate and regular rhythm.      Heart sounds: Normal heart sounds.   Pulmonary:      Effort: Pulmonary effort is normal. No respiratory distress.      Breath sounds: Normal breath sounds. No wheezing.   Abdominal:      General: Bowel sounds are normal. There is no distension.      Palpations: Abdomen is soft. There is no mass.      Tenderness: There is no abdominal tenderness. There is no guarding.      Hernia: No hernia is present.   Skin:     General: Skin is warm and dry.      Capillary Refill: Capillary refill takes less than 2 seconds.   Neurological:      Mental Status: She is alert and oriented to person, place, and  time.   Psychiatric:         Behavior: Behavior normal.     Problem list    Abnormal CT of the abdomen suspicious for colitis, esophagitis, gastritis  Nausea resolved  Vomiting resolved  Diarrhea resolved    Assessment/plan    Pleasant 59-year-old female seen today in follow-up status post ER evaluation as summarized above found to have mild colitis treated with Flagyl.  CT also revealed esophagitis and gastritis.  Patient is asymptomatic on visit today.  Given recent symptoms that prompted ER evaluation and abnormal CT of the abdomen recommend EGD for further evaluation as well as sigmoidoscopy with Dr. Trimble.  Resume daily PPI therapy.  Avoid NSAIDs and alcohol in the interim.  Further recommendations and follow-up pending endoscopic work-up.

## 2020-12-12 ENCOUNTER — HOSPITAL ENCOUNTER (OUTPATIENT)
Dept: MAMMOGRAPHY | Facility: HOSPITAL | Age: 60
Discharge: HOME | End: 2020-12-12
Admitting: INTERNAL MEDICINE

## 2020-12-12 DIAGNOSIS — Z12.31 SCREENING MAMMOGRAM, ENCOUNTER FOR: ICD-10-CM

## 2020-12-12 PROCEDURE — 77063 BREAST TOMOSYNTHESIS BI: CPT

## 2020-12-12 PROCEDURE — 77067 SCR MAMMO BI INCL CAD: CPT

## 2021-01-04 RX ORDER — ESCITALOPRAM OXALATE 20 MG/1
TABLET ORAL
Qty: 30 TABLET | Refills: 1 | Status: SHIPPED | OUTPATIENT
Start: 2021-01-04 | End: 2021-03-30 | Stop reason: SDUPTHER

## 2021-02-15 RX ORDER — BUSPIRONE HYDROCHLORIDE 10 MG/1
10 TABLET ORAL 2 TIMES DAILY
Qty: 60 TABLET | Refills: 2 | Status: SHIPPED | OUTPATIENT
Start: 2021-02-15 | End: 2021-03-30 | Stop reason: SDUPTHER

## 2021-03-30 RX ORDER — BUSPIRONE HYDROCHLORIDE 10 MG/1
10 TABLET ORAL 2 TIMES DAILY
Qty: 60 TABLET | Refills: 0 | Status: SHIPPED | OUTPATIENT
Start: 2021-03-30 | End: 2022-07-12 | Stop reason: SDUPTHER

## 2021-03-30 RX ORDER — ESCITALOPRAM OXALATE 20 MG/1
20 TABLET ORAL DAILY
Qty: 30 TABLET | Refills: 0 | Status: SHIPPED | OUTPATIENT
Start: 2021-03-30 | End: 2022-07-12 | Stop reason: SDUPTHER

## 2021-07-01 RX ORDER — ESCITALOPRAM OXALATE 20 MG/1
TABLET ORAL
Qty: 30 TABLET | Refills: 0 | OUTPATIENT
Start: 2021-07-01

## 2021-10-14 ENCOUNTER — TRANSCRIBE ORDERS (OUTPATIENT)
Dept: ADMINISTRATIVE | Facility: HOSPITAL | Age: 61
End: 2021-10-14

## 2021-10-14 DIAGNOSIS — Z12.31 VISIT FOR SCREENING MAMMOGRAM: Primary | ICD-10-CM

## 2021-12-13 RX ORDER — PANTOPRAZOLE SODIUM 40 MG/1
TABLET, DELAYED RELEASE ORAL
Qty: 90 TABLET | Refills: 0 | Status: SHIPPED | OUTPATIENT
Start: 2021-12-13 | End: 2022-03-14

## 2021-12-20 ENCOUNTER — HOSPITAL ENCOUNTER (OUTPATIENT)
Dept: MAMMOGRAPHY | Facility: HOSPITAL | Age: 61
Discharge: HOME | End: 2021-12-20
Admitting: INTERNAL MEDICINE

## 2021-12-20 DIAGNOSIS — Z12.31 VISIT FOR SCREENING MAMMOGRAM: ICD-10-CM

## 2021-12-20 PROCEDURE — 77063 BREAST TOMOSYNTHESIS BI: CPT

## 2021-12-20 PROCEDURE — 77067 SCR MAMMO BI INCL CAD: CPT

## 2022-01-10 ENCOUNTER — OFFICE VISIT (OUTPATIENT)
Dept: GASTROENTEROLOGY | Facility: CLINIC | Age: 62
End: 2022-01-10

## 2022-01-10 VITALS — HEIGHT: 69 IN | WEIGHT: 229.6 LBS | TEMPERATURE: 97 F | BODY MASS INDEX: 34 KG/M2

## 2022-01-10 DIAGNOSIS — K21.00 GASTROESOPHAGEAL REFLUX DISEASE WITH ESOPHAGITIS, UNSPECIFIED WHETHER HEMORRHAGE: Primary | ICD-10-CM

## 2022-01-10 PROCEDURE — 99214 OFFICE O/P EST MOD 30 MIN: CPT | Performed by: NURSE PRACTITIONER

## 2022-01-10 RX ORDER — LEVOTHYROXINE SODIUM 0.2 MG/1
TABLET ORAL
COMMUNITY
Start: 2021-12-11

## 2022-01-10 NOTE — ADDENDUM NOTE
Addended by: CHLOÉ MABRY on: 1/10/2022 12:47 PM     Modules accepted: Orders, Level of Service, SmartSet

## 2022-01-10 NOTE — PROGRESS NOTES
Chief Complaint   Patient presents with   • Heartburn       HPI    Eunice Ennis is a  61 y.o. female here with a history of esophagitis for a follow up visit for GERD.     Patient follows with Dr. Trimble and myself.    Patient recommended to have a follow-up EGD and colonoscopy on last office visit fall 2020 but failed to do so.  Patient canceled scheduled endoscopic examination in 2019 as well as 2020.    On visit today she reports feeling quite well.  She is reduce amount of energy drinks and coffee as she is consuming.  She continues on daily PPI therapy (Protonix 40 mg once daily).  She denies breakthrough dyspeptic symptoms, nausea, vomiting, dysphagia, odynophagia.  Appetite is good.  Her weight is stable.  No early satiety.    No diarrhea, constipation, rectal bleeding.    Data reviewed:     9/27/2019 EGD with grade D esophagitis.  Repeat 3 months.  2/21/2019 colonoscopy normal.    Cholecystectomy 9/13/2019    Past Medical History:   Diagnosis Date   • Anxiety    • Asthma    • Bladder disorder    • Diabetes mellitus (HCC)    • GERD (gastroesophageal reflux disease)    • HLD (hyperlipidemia) 9/27/2019   • Hyperlipidemia    • Hypothyroidism    • Intractable nausea and vomiting 9/27/2019   • MRSA (methicillin resistant Staphylococcus aureus) 2010    spider bite - blood    • Thyroid disease        Past Surgical History:   Procedure Laterality Date   • BLADDER SURGERY     • CHOLECYSTECTOMY     • CHOLECYSTECTOMY WITH INTRAOPERATIVE CHOLANGIOGRAM N/A 9/30/2019    Procedure: CHOLECYSTECTOMY LAPAROSCOPIC INTRAOPERATIVE CHOLANGIOGRAM;  Surgeon: Jonathan Tiwari Jr., MD;  Location: [facility] OR;  Service: General   • COLONOSCOPY W/ POLYPECTOMY N/A 2/21/2019    Procedure: COLONOSCOPY TO CECUM;  Surgeon: Rony Trimble MD;  Location: [facility] ENDOSCOPY;  Service: Gastroenterology   • ENDOSCOPY N/A 2/21/2019    Procedure: ESOPHAGOGASTRODUODENOSCOPY WITH BIOPSY;  Surgeon: Rony Trimble MD;  Location:  [facility] ENDOSCOPY;  Service: Gastroenterology   • ENDOSCOPY N/A 9/27/2019    Procedure: ESOPHAGOGASTRODUODENOSCOPY WITH BIOPSIES;  Surgeon: Rony Barakat MD;  Location: [facility] ENDOSCOPY;  Service: Gastroenterology   • EXPLORATORY LAPAROTOMY     • OOPHORECTOMY     • SUBTOTAL HYSTERECTOMY     • TONSILLECTOMY         Scheduled Meds:     Continuous Infusions: No current facility-administered medications for this visit.      PRN Meds:     Allergies   Allergen Reactions   • Bactrim [Sulfamethoxazole-Trimethoprim] GI Intolerance   • Sulfa Antibiotics GI Intolerance       Social History     Socioeconomic History   • Marital status:    Tobacco Use   • Smoking status: Former Smoker     Years: 1.00     Types: Cigarettes   • Smokeless tobacco: Never Used   Substance and Sexual Activity   • Alcohol use: No   • Drug use: No   • Sexual activity: Defer       Family History   Problem Relation Age of Onset   • Diabetes Mother    • Hypertension Sister    • Breast cancer Neg Hx    • Ovarian cancer Neg Hx        Review of Systems   Constitutional: Negative for activity change, appetite change, fatigue, fever and unexpected weight change.   HENT: Negative for trouble swallowing.    Respiratory: Negative for apnea, cough, choking, chest tightness, shortness of breath and wheezing.    Cardiovascular: Negative for chest pain, palpitations and leg swelling.   Gastrointestinal: Negative for abdominal distention, abdominal pain, anal bleeding, blood in stool, constipation, diarrhea, nausea, rectal pain and vomiting.       Vitals:    01/10/22 0840   Temp: 97 °F (36.1 °C)       Physical Exam  Constitutional:       Appearance: She is well-developed.   Abdominal:      General: Bowel sounds are normal. There is no distension.      Palpations: Abdomen is soft. There is no mass.      Tenderness: There is no abdominal tenderness. There is no guarding.      Hernia: No hernia is present.   Skin:     General: Skin is warm and dry.      Capillary  Refill: Capillary refill takes less than 2 seconds.   Neurological:      Mental Status: She is alert and oriented to person, place, and time.   Psychiatric:         Behavior: Behavior normal.       Assessment    Diagnoses and all orders for this visit:    1. Gastroesophageal reflux disease with esophagitis, unspecified whether hemorrhage (Primary)  -     Case Request; Standing  -     Case Request    Plan    Stable GERD, continue PPI therapy.  Antireflux measures and dietary modifications reviewed. Low acid diet reviewed. Keep head of bed elevated. Stop eating/drinking at least 3 hours prior to bedtime. Eliminate caffeine and carbonated beverages.  Weight loss encouraged if BMI over 25.  Spoke with Dr. Trimble regarding the patient's plan of care and we still recommend she have a follow-up EGD to reassess esophagitis and exclude Mathis's esophagus.  Further recommendations and follow-up pending aforementioned work-up.         JOHNNY Michelle  [facility]  [address] [place] [address]  Office: [phone]

## 2022-01-13 ENCOUNTER — TELEPHONE (OUTPATIENT)
Dept: GASTROENTEROLOGY | Facility: CLINIC | Age: 62
End: 2022-01-13

## 2022-01-13 RX ORDER — BUSPIRONE HYDROCHLORIDE 10 MG/1
TABLET ORAL
Qty: 60 TABLET | Refills: 0 | OUTPATIENT
Start: 2022-01-13

## 2022-01-13 NOTE — TELEPHONE ENCOUNTER
----- Message from JOHNNY Michelle sent at 1/10/2022 12:47 PM EST -----  Regarding: recs  Please contact the patient and let her know that I spoke with Dr. Trimble and he still recommends she have a follow-up EGD to make sure esophagitis is completely gone and evaluate for the presence of Mathis's esophagus.  Case request has been placed.  If she is agreeable please help her get it scheduled.  ----- Message -----  From: Rony Trimble MD  Sent: 1/10/2022   9:41 AM EST  To: JOHNNY Michelle    Yes I would still recommend it.  Higher risk of Barretts with severe esophagitis, and as you know, pts often do not have sx of Barretts until it becomes advanced.    Thanks!    ----- Message -----  From: Esther Moore APRN  Sent: 1/10/2022   9:27 AM EST  To: Rony Trimble MD    I saw Eunice in follow-up today she seems to be doing well but this patient has a history of grade D reflux esophagitis and was supposed to follow-up on repeat EGD 2019 and 2020 but did not do so.  I also scheduled her for follow-up colonoscopy last year after she had a CT that showed colitis but she did not do that either.    That being said she is asymptomatic on visit today.  I did continue PPI therapy encourage antireflux diet.  Do you think she is still needs endoscopic examination? I am not sure she would actually follow through.

## 2022-01-13 NOTE — TELEPHONE ENCOUNTER
Patient called. Advised as per Dr. Trimble's recommendations. She states due to her financial situation, she will have to wait till later in the year for her scope. Advised will send an update to Dr. Trimble and Esther. She verb understanding.

## 2022-01-17 ENCOUNTER — TELEPHONE (OUTPATIENT)
Dept: GASTROENTEROLOGY | Facility: CLINIC | Age: 62
End: 2022-01-17

## 2022-02-03 RX ORDER — BUSPIRONE HYDROCHLORIDE 10 MG/1
TABLET ORAL
Qty: 60 TABLET | Refills: 0 | OUTPATIENT
Start: 2022-02-03

## 2022-03-14 RX ORDER — PANTOPRAZOLE SODIUM 40 MG/1
TABLET, DELAYED RELEASE ORAL
Qty: 90 TABLET | Refills: 3 | Status: SHIPPED | OUTPATIENT
Start: 2022-03-14 | End: 2022-07-11 | Stop reason: SDUPTHER

## 2022-07-11 ENCOUNTER — OFFICE VISIT (OUTPATIENT)
Dept: GASTROENTEROLOGY | Facility: CLINIC | Age: 62
End: 2022-07-11

## 2022-07-11 VITALS
WEIGHT: 240 LBS | TEMPERATURE: 96.1 F | SYSTOLIC BLOOD PRESSURE: 127 MMHG | HEART RATE: 76 BPM | BODY MASS INDEX: 36.37 KG/M2 | HEIGHT: 68 IN | DIASTOLIC BLOOD PRESSURE: 76 MMHG

## 2022-07-11 DIAGNOSIS — K21.9 GASTROESOPHAGEAL REFLUX DISEASE, UNSPECIFIED WHETHER ESOPHAGITIS PRESENT: Primary | ICD-10-CM

## 2022-07-11 DIAGNOSIS — Z87.19 HISTORY OF ESOPHAGITIS: ICD-10-CM

## 2022-07-11 PROCEDURE — 99213 OFFICE O/P EST LOW 20 MIN: CPT | Performed by: NURSE PRACTITIONER

## 2022-07-11 RX ORDER — PANTOPRAZOLE SODIUM 40 MG/1
40 TABLET, DELAYED RELEASE ORAL DAILY
Qty: 90 TABLET | Refills: 3 | Status: SHIPPED | OUTPATIENT
Start: 2022-07-11

## 2022-07-11 NOTE — PROGRESS NOTES
Chief Complaint   Patient presents with   • Heartburn       HPI    Eunice Ennis is a  61 y.o. female here with a history of esophagitis for a follow up visit for GERD.    Patient follows with Dr. Trimble and myself.    Patient been recommended to have follow-up EGD to reassess mucosal healing, response to therapy, and rule out Mathis's esophagus but she has failed to follow through on recommendations.    Today she reports adequate control dyspeptic symptoms on once daily Protonix 40 mg.  Needs a refill.  Denies nausea, vomiting, dysphagia, odynophagia, poor appetite, abdominal pain, or weight loss.  She is aware that she has not follow through with our recommendations for repeat EGD but on visit today again reports she has no plans to undergo endoscopic examination at this time cites excessive medical bills.    No diarrhea, constipation, or rectal bleeding.    Data reviewed:     9/27/2019 EGD with grade D esophagitis.  Repeat 3 months.  2/21/2019 colonoscopy normal.     Cholecystectomy 9/13/2019      Past Medical History:   Diagnosis Date   • Anxiety    • Asthma    • Bladder disorder    • Diabetes mellitus (HCC)    • GERD (gastroesophageal reflux disease)    • HLD (hyperlipidemia) 9/27/2019   • Hyperlipidemia    • Hypothyroidism    • Intractable nausea and vomiting 9/27/2019   • MRSA (methicillin resistant Staphylococcus aureus) 2010    spider bite - blood    • Thyroid disease        Past Surgical History:   Procedure Laterality Date   • BLADDER SURGERY     • CHOLECYSTECTOMY     • CHOLECYSTECTOMY WITH INTRAOPERATIVE CHOLANGIOGRAM N/A 9/30/2019    Procedure: CHOLECYSTECTOMY LAPAROSCOPIC INTRAOPERATIVE CHOLANGIOGRAM;  Surgeon: Jonathan Tiwari Jr., MD;  Location: [facility] OR;  Service: General   • COLONOSCOPY W/ POLYPECTOMY N/A 2/21/2019    Procedure: COLONOSCOPY TO CECUM;  Surgeon: Rony Trimble MD;  Location: [facility] ENDOSCOPY;  Service: Gastroenterology   • ENDOSCOPY N/A 2/21/2019    Procedure:  ESOPHAGOGASTRODUODENOSCOPY WITH BIOPSY;  Surgeon: Rony Trimble MD;  Location: [facility] ENDOSCOPY;  Service: Gastroenterology   • ENDOSCOPY N/A 9/27/2019    Procedure: ESOPHAGOGASTRODUODENOSCOPY WITH BIOPSIES;  Surgeon: Rony Barakat MD;  Location: [facility] ENDOSCOPY;  Service: Gastroenterology   • EXPLORATORY LAPAROTOMY     • OOPHORECTOMY     • SUBTOTAL HYSTERECTOMY     • TONSILLECTOMY         Scheduled Meds:     Continuous Infusions: No current facility-administered medications for this visit.      PRN Meds:     Allergies   Allergen Reactions   • Bactrim [Sulfamethoxazole-Trimethoprim] GI Intolerance   • Sulfa Antibiotics GI Intolerance       Social History     Socioeconomic History   • Marital status:    Tobacco Use   • Smoking status: Former Smoker     Years: 1.00     Types: Cigarettes   • Smokeless tobacco: Never Used   Substance and Sexual Activity   • Alcohol use: No   • Drug use: No   • Sexual activity: Defer       Family History   Problem Relation Age of Onset   • Diabetes Mother    • Hypertension Sister    • Breast cancer Neg Hx    • Ovarian cancer Neg Hx        Review of Systems   Constitutional: Negative for activity change, appetite change, fatigue, fever and unexpected weight change.   HENT: Negative for trouble swallowing.    Respiratory: Negative for apnea, cough, choking, chest tightness, shortness of breath and wheezing.    Cardiovascular: Negative for chest pain, palpitations and leg swelling.   Gastrointestinal: Negative for abdominal distention, abdominal pain, anal bleeding, blood in stool, constipation, diarrhea, nausea, rectal pain and vomiting.       Vitals:    07/11/22 0802   BP: 127/76   Pulse: 76   Temp: 96.1 °F (35.6 °C)       Physical Exam  Constitutional:       Appearance: She is well-developed.   Abdominal:      General: Bowel sounds are normal. There is no distension.      Palpations: Abdomen is soft. There is no mass.      Tenderness: There is no abdominal tenderness.  There is no guarding.      Hernia: No hernia is present.   Skin:     General: Skin is warm and dry.      Capillary Refill: Capillary refill takes less than 2 seconds.   Neurological:      Mental Status: She is alert and oriented to person, place, and time.   Psychiatric:         Behavior: Behavior normal.       Assessment    Diagnoses and all orders for this visit:    1. Gastroesophageal reflux disease, unspecified whether esophagitis present (Primary)    2. History of esophagitis    Other orders  -     pantoprazole (PROTONIX) 40 MG EC tablet; Take 1 tablet by mouth Daily.  Dispense: 90 tablet; Refill: 3      Plan    Continue PPI therapy to manage gastroesophageal reflux disease  Again reiterated the need to follow-up on EGD to assess mucosal healing, response to therapy, and rule out Mathis's esophagus but patient has made an informed decision and again declines secondary to mounting medical bills.  Strict antireflux diet.  Follow-up 1 year or sooner if needed.          JOHNNY Michelle  [facility]  [address] [place] [address]  Office: [phone]

## 2022-07-12 ENCOUNTER — OFFICE VISIT (OUTPATIENT)
Dept: FAMILY MEDICINE CLINIC | Facility: CLINIC | Age: 62
End: 2022-07-12

## 2022-07-12 ENCOUNTER — PATIENT ROUNDING (BHMG ONLY) (OUTPATIENT)
Dept: FAMILY MEDICINE CLINIC | Facility: CLINIC | Age: 62
End: 2022-07-12

## 2022-07-12 VITALS
SYSTOLIC BLOOD PRESSURE: 124 MMHG | WEIGHT: 236.8 LBS | TEMPERATURE: 97.1 F | HEART RATE: 82 BPM | HEIGHT: 68 IN | OXYGEN SATURATION: 97 % | DIASTOLIC BLOOD PRESSURE: 70 MMHG | BODY MASS INDEX: 35.89 KG/M2

## 2022-07-12 DIAGNOSIS — N32.81 OVERACTIVE BLADDER: ICD-10-CM

## 2022-07-12 DIAGNOSIS — J45.20 MILD INTERMITTENT ASTHMA, UNSPECIFIED WHETHER COMPLICATED: ICD-10-CM

## 2022-07-12 DIAGNOSIS — Z00.00 ANNUAL PHYSICAL EXAM: Primary | ICD-10-CM

## 2022-07-12 DIAGNOSIS — K20.90 ESOPHAGITIS: ICD-10-CM

## 2022-07-12 DIAGNOSIS — F41.9 ANXIETY: ICD-10-CM

## 2022-07-12 DIAGNOSIS — E11.9 TYPE 2 DIABETES MELLITUS WITHOUT COMPLICATION, WITH LONG-TERM CURRENT USE OF INSULIN: ICD-10-CM

## 2022-07-12 DIAGNOSIS — R53.83 FATIGUE, UNSPECIFIED TYPE: ICD-10-CM

## 2022-07-12 DIAGNOSIS — E03.8 OTHER SPECIFIED HYPOTHYROIDISM: ICD-10-CM

## 2022-07-12 DIAGNOSIS — E78.2 MIXED HYPERLIPIDEMIA: ICD-10-CM

## 2022-07-12 DIAGNOSIS — Z79.4 TYPE 2 DIABETES MELLITUS WITHOUT COMPLICATION, WITH LONG-TERM CURRENT USE OF INSULIN: ICD-10-CM

## 2022-07-12 PROBLEM — R11.2 INTRACTABLE VOMITING WITH NAUSEA: Status: RESOLVED | Noted: 2019-09-27 | Resolved: 2022-07-12

## 2022-07-12 PROBLEM — R11.2 INTRACTABLE NAUSEA AND VOMITING: Status: RESOLVED | Noted: 2019-09-27 | Resolved: 2022-07-12

## 2022-07-12 LAB
25(OH)D3 SERPL-MCNC: 33.5 NG/ML (ref 30–100)
ALBUMIN SERPL-MCNC: 4.2 G/DL (ref 3.5–5.2)
ALBUMIN/GLOB SERPL: 1.2 G/DL
ALP SERPL-CCNC: 88 U/L (ref 39–117)
ALT SERPL W P-5'-P-CCNC: 28 U/L (ref 1–33)
ANION GAP SERPL CALCULATED.3IONS-SCNC: 8.8 MMOL/L (ref 5–15)
AST SERPL-CCNC: 20 U/L (ref 1–32)
BILIRUB SERPL-MCNC: 0.4 MG/DL (ref 0–1.2)
BUN SERPL-MCNC: 21 MG/DL (ref 6–20)
BUN/CREAT SERPL: 22.3 (ref 7–25)
CALCIUM SPEC-SCNC: 9.5 MG/DL (ref 8.6–10.5)
CHLORIDE SERPL-SCNC: 102 MMOL/L (ref 98–107)
CHOLEST SERPL-MCNC: 204 MG/DL (ref 0–200)
CO2 SERPL-SCNC: 28.2 MMOL/L (ref 22–29)
CREAT SERPL-MCNC: 0.94 MG/DL (ref 0.57–1)
EGFRCR SERPLBLD CKD-EPI 2021: 70 ML/MIN/1.73
ERYTHROCYTE [DISTWIDTH] IN BLOOD BY AUTOMATED COUNT: 13.1 % (ref 12.3–15.4)
GLOBULIN UR ELPH-MCNC: 3.4 GM/DL
GLUCOSE SERPL-MCNC: 330 MG/DL (ref 65–99)
HCT VFR BLD AUTO: 44.7 % (ref 34–46.6)
HDLC SERPL-MCNC: 73 MG/DL (ref 40–60)
HGB BLD-MCNC: 14.6 G/DL (ref 12–15.9)
LDLC SERPL CALC-MCNC: 106 MG/DL (ref 0–100)
LDLC/HDLC SERPL: 1.4 {RATIO}
LYMPHOCYTES # BLD AUTO: 2.3 10*3/MM3 (ref 0.7–3.1)
LYMPHOCYTES NFR BLD AUTO: 30.2 % (ref 19.6–45.3)
MCH RBC QN AUTO: 29.3 PG (ref 26.6–33)
MCHC RBC AUTO-ENTMCNC: 32.6 G/DL (ref 31.5–35.7)
MCV RBC AUTO: 89.9 FL (ref 79–97)
MONOCYTES # BLD AUTO: 0.2 10*3/MM3 (ref 0.1–0.9)
MONOCYTES NFR BLD AUTO: 2.2 % (ref 5–12)
NEUTROPHILS NFR BLD AUTO: 5.1 10*3/MM3 (ref 1.7–7)
NEUTROPHILS NFR BLD AUTO: 67.6 % (ref 42.7–76)
PLATELET # BLD AUTO: 238 10*3/MM3 (ref 140–450)
PMV BLD AUTO: 7.5 FL (ref 6–12)
POTASSIUM SERPL-SCNC: 4.8 MMOL/L (ref 3.5–5.2)
PROT SERPL-MCNC: 7.6 G/DL (ref 6–8.5)
RBC # BLD AUTO: 4.97 10*6/MM3 (ref 3.77–5.28)
SODIUM SERPL-SCNC: 139 MMOL/L (ref 136–145)
TRIGL SERPL-MCNC: 143 MG/DL (ref 0–150)
VLDLC SERPL-MCNC: 25 MG/DL (ref 5–40)
WBC NRBC COR # BLD: 7.6 10*3/MM3 (ref 3.4–10.8)

## 2022-07-12 PROCEDURE — 99396 PREV VISIT EST AGE 40-64: CPT | Performed by: NURSE PRACTITIONER

## 2022-07-12 PROCEDURE — 80061 LIPID PANEL: CPT | Performed by: NURSE PRACTITIONER

## 2022-07-12 PROCEDURE — 85025 COMPLETE CBC W/AUTO DIFF WBC: CPT | Performed by: NURSE PRACTITIONER

## 2022-07-12 PROCEDURE — 82306 VITAMIN D 25 HYDROXY: CPT | Performed by: NURSE PRACTITIONER

## 2022-07-12 PROCEDURE — 80053 COMPREHEN METABOLIC PANEL: CPT | Performed by: NURSE PRACTITIONER

## 2022-07-12 RX ORDER — ESCITALOPRAM OXALATE 20 MG/1
20 TABLET ORAL DAILY
Qty: 30 TABLET | Refills: 5 | Status: SHIPPED | OUTPATIENT
Start: 2022-07-12 | End: 2022-12-19 | Stop reason: SDUPTHER

## 2022-07-12 RX ORDER — ALBUTEROL SULFATE 90 UG/1
2 AEROSOL, METERED RESPIRATORY (INHALATION) EVERY 4 HOURS PRN
Qty: 18 G | Refills: 0 | Status: SHIPPED | OUTPATIENT
Start: 2022-07-12 | End: 2022-10-24

## 2022-07-12 RX ORDER — BUSPIRONE HYDROCHLORIDE 10 MG/1
10 TABLET ORAL 2 TIMES DAILY
Qty: 60 TABLET | Refills: 5 | Status: SHIPPED | OUTPATIENT
Start: 2022-07-12 | End: 2022-12-19 | Stop reason: SDUPTHER

## 2022-07-12 RX ORDER — ORAL SEMAGLUTIDE 14 MG/1
TABLET ORAL
COMMUNITY

## 2022-07-12 NOTE — PROGRESS NOTES
July 12, 2022    Hello, may I speak with Eunice Ennis?    My name is Hanna    I am  with [facility]  [address] [place] [id number] [phone].    Before we get started may I verify your date of birth? 1960    I am calling to officially welcome you to our practice and ask about your recent visit. Is this a good time to talk? Yes    Tell me about your visit with us. What things went well?  All went well       We're always looking for ways to make our patients' experiences even better. Do you have recommendations on ways we may improve?  No    Overall were you satisfied with your first visit to our practice? Yes       I appreciate you taking the time to speak with me today. Is there anything else I can do for you? No      Thank you, and have a great day.

## 2022-07-12 NOTE — ASSESSMENT & PLAN NOTE
Asthma is improving with treatment.  The patient is experiencing no daytime asthma symptoms. She is experiencing no nighttime asthma symptoms.  Personalized, written asthma management plan given.  Asthma information handout given.  Patient to keep asthma diary.  Discussed monitoring symptoms and use of quick-relief medications and contacting us early in the course of exacerbations.  Warning signs of respiratory distress were reviewed with the patient.

## 2022-07-12 NOTE — ASSESSMENT & PLAN NOTE
Patient is stable taking BuSpar and Lexapro.  Check labs today.  She denies any suicidal homicidal ideation no depression noted.

## 2022-07-12 NOTE — ASSESSMENT & PLAN NOTE
Check lipid panel today continue diet and exercise.  Continue atorvastatin which is prescribed by endocrinology

## 2022-07-12 NOTE — PROGRESS NOTES
"Chief Complaint  Med Refill, Anxiety, and Depression    Subjective        Eunice Ennis presents to [facility]  Patient presents to the office for an annual visit.  Patient is here to discuss anxiety without depression.  She has been stable with Lexapro and BuSpar.  Patient is needing refills today.  Patient is not seeing psychiatrist or counselor at this time.  Patient has multiple chronic conditions and is following GI for GERD, urology for overactive bladder.  She is seeing endocrinology for diabetes mellitus type 2 on insulin and hypothyroidism.  Patient has asthma only uses albuterol as needed.  She denies chest pain shortness of air.  Patient does not smoke or drink.  Patient blood pressure is BP: 124/70 (07/12/22 0824).  Patient denies chest pain / shortness of air. Patient is without acute pain or distress at time of dictation.                             Objective   Vital Signs:  /70 (BP Location: Left arm, Patient Position: Sitting, Cuff Size: Large Adult)   Pulse 82   Temp 97.1 °F (36.2 °C) (Infrared)   Ht 172.7 cm (68\")   Wt 107 kg (236 lb 12.8 oz)   SpO2 97%   BMI 36.01 kg/m²   Estimated body mass index is 36.01 kg/m² as calculated from the following:    Height as of this encounter: 172.7 cm (68\").    Weight as of this encounter: 107 kg (236 lb 12.8 oz).          Physical Exam  Constitutional:       General: She is not in acute distress.     Appearance: Normal appearance. She is not ill-appearing, toxic-appearing or diaphoretic.   HENT:      Head: Normocephalic.      Right Ear: Tympanic membrane and external ear normal. There is no impacted cerumen.      Left Ear: Tympanic membrane and external ear normal. There is no impacted cerumen.      Nose: Nose normal. No congestion or rhinorrhea.      Mouth/Throat:      Mouth: Mucous membranes are moist.      Pharynx: Oropharynx is clear. No oropharyngeal exudate or posterior oropharyngeal erythema.   Eyes:      " General:         Right eye: No discharge.         Left eye: No discharge.      Extraocular Movements: Extraocular movements intact.      Conjunctiva/sclera: Conjunctivae normal.      Pupils: Pupils are equal, round, and reactive to light.   Neck:      Vascular: No carotid bruit.   Cardiovascular:      Rate and Rhythm: Normal rate and regular rhythm.      Pulses: Normal pulses.      Heart sounds: Normal heart sounds. No murmur heard.    No friction rub. No gallop.   Pulmonary:      Effort: Pulmonary effort is normal. No respiratory distress.      Breath sounds: Normal breath sounds. No wheezing, rhonchi or rales.   Chest:      Chest wall: No tenderness.   Abdominal:      General: Abdomen is flat. Bowel sounds are normal. There is no distension.      Palpations: Abdomen is soft. There is no mass.      Tenderness: There is no abdominal tenderness. There is no guarding or rebound.      Hernia: No hernia is present.   Musculoskeletal:         General: No swelling or tenderness. Normal range of motion.      Cervical back: Normal range of motion and neck supple. No tenderness.   Skin:     General: Skin is warm and dry.      Coloration: Skin is not jaundiced or pale.      Findings: No erythema or rash.   Neurological:      Mental Status: She is alert and oriented to person, place, and time.      Sensory: No sensory deficit.      Motor: No weakness.      Gait: Gait normal.   Psychiatric:         Attention and Perception: Attention normal.         Mood and Affect: Mood is anxious and depressed.         Speech: Speech normal.         Behavior: Behavior normal.         Thought Content: Thought content normal.         Cognition and Memory: Cognition and memory normal.         Judgment: Judgment normal.        Result Review :  The following data was reviewed by: JOHNNY Shepherd on 07/12/2022:  Common labs    Common Labsle 7/12/22 7/12/22 7/12/22    0850 0850 0850   Glucose  330 (A)    BUN  21 (A)    Creatinine  0.94     Sodium  139    Potassium  4.8    Chloride  102    Calcium  9.5    Albumin  4.20    Total Bilirubin  0.4    Alkaline Phosphatase  88    AST (SGOT)  20    ALT (SGPT)  28    WBC 7.60     Hemoglobin 14.6     Hematocrit 44.7     Platelets 238     Total Cholesterol   204 (A)   Triglycerides   143   HDL Cholesterol   73 (A)   LDL Cholesterol    106 (A)   (A) Abnormal value                     Assessment and Plan   Diagnoses and all orders for this visit:    1. Annual physical exam (Primary)  Assessment & Plan:  Counseling was provided on nutrition, physical activity, development, and injury prevention, dental health, and safe sex practices patient verbalizes understanding no additional questions were asked.      Orders:  -     CBC & Differential  -     Comprehensive Metabolic Panel  -     Lipid Panel  -     Vitamin D 25 Hydroxy    2. Mixed hyperlipidemia  Assessment & Plan:  Check lipid panel today continue diet and exercise.  Continue atorvastatin which is prescribed by endocrinology      3. Type 2 diabetes mellitus without complication, with long-term current use of insulin ([facility])  Assessment & Plan:  Takes insulin follows Endo patient works on diabetic diet and exercise.      4. Mild intermittent asthma, unspecified whether complicated  Assessment & Plan:  Asthma is improving with treatment.  The patient is experiencing no daytime asthma symptoms. She is experiencing no nighttime asthma symptoms.  Personalized, written asthma management plan given.  Asthma information handout given.  Patient to keep asthma diary.  Discussed monitoring symptoms and use of quick-relief medications and contacting us early in the course of exacerbations.  Warning signs of respiratory distress were reviewed with the patient.         Orders:  -     albuterol sulfate  (90 Base) MCG/ACT inhaler; Inhale 2 puffs Every 4 (Four) Hours As Needed for Wheezing.  Dispense: 18 g; Refill: 0    5. Fatigue, unspecified type  -     Vitamin D 25  Hydroxy    6. Anxiety  Assessment & Plan:  Patient is stable taking BuSpar and Lexapro.  Check labs today.  She denies any suicidal homicidal ideation no depression noted.    Orders:  -     escitalopram (LEXAPRO) 20 MG tablet; Take 1 tablet by mouth Daily.  Dispense: 30 tablet; Refill: 5  -     busPIRone (BUSPAR) 10 MG tablet; Take 1 tablet by mouth 2 (Two) Times a Day.  Dispense: 60 tablet; Refill: 5    7. Overactive bladder  Assessment & Plan:  Follows urologist.      8. Other specified hypothyroidism  Assessment & Plan:  Follows endocrinology.      9. Esophagitis  Assessment & Plan:  Patient follows GI last colonoscopy was 2019 normal.    Counseling was provided on nutrition, physical activity, development, and injury prevention, dental health, and safe sex practices patient verbalizes understanding no additional questions were asked.  Patient deferred EKG and chest x-ray today.         Follow Up   Return in about 6 months (around 1/12/2023).  Patient was given instructions and counseling regarding her condition or for health maintenance advice. Please see specific information pulled into the AVS if appropriate.

## 2022-07-19 DIAGNOSIS — E03.8 OTHER SPECIFIED HYPOTHYROIDISM: Primary | ICD-10-CM

## 2022-07-19 DIAGNOSIS — E11.9 TYPE 2 DIABETES MELLITUS WITHOUT COMPLICATION, WITH LONG-TERM CURRENT USE OF INSULIN: ICD-10-CM

## 2022-07-19 DIAGNOSIS — Z79.4 TYPE 2 DIABETES MELLITUS WITHOUT COMPLICATION, WITH LONG-TERM CURRENT USE OF INSULIN: ICD-10-CM

## 2022-10-24 DIAGNOSIS — J45.20 MILD INTERMITTENT ASTHMA, UNSPECIFIED WHETHER COMPLICATED: ICD-10-CM

## 2022-10-24 RX ORDER — ALBUTEROL SULFATE 90 UG/1
AEROSOL, METERED RESPIRATORY (INHALATION)
Qty: 18 G | Refills: 0 | Status: SHIPPED | OUTPATIENT
Start: 2022-10-24 | End: 2022-12-19 | Stop reason: SDUPTHER

## 2022-12-19 ENCOUNTER — OFFICE VISIT (OUTPATIENT)
Dept: FAMILY MEDICINE CLINIC | Facility: CLINIC | Age: 62
End: 2022-12-19

## 2022-12-19 VITALS
WEIGHT: 233.6 LBS | HEART RATE: 70 BPM | HEIGHT: 68 IN | BODY MASS INDEX: 35.4 KG/M2 | RESPIRATION RATE: 18 BRPM | OXYGEN SATURATION: 99 % | SYSTOLIC BLOOD PRESSURE: 126 MMHG | DIASTOLIC BLOOD PRESSURE: 84 MMHG | TEMPERATURE: 98.6 F

## 2022-12-19 DIAGNOSIS — J45.20 MILD INTERMITTENT ASTHMA, UNSPECIFIED WHETHER COMPLICATED: ICD-10-CM

## 2022-12-19 DIAGNOSIS — E03.8 OTHER SPECIFIED HYPOTHYROIDISM: ICD-10-CM

## 2022-12-19 DIAGNOSIS — E78.2 MIXED HYPERLIPIDEMIA: ICD-10-CM

## 2022-12-19 DIAGNOSIS — F41.9 ANXIETY: ICD-10-CM

## 2022-12-19 DIAGNOSIS — Z79.4 TYPE 2 DIABETES MELLITUS WITHOUT COMPLICATION, WITH LONG-TERM CURRENT USE OF INSULIN: ICD-10-CM

## 2022-12-19 DIAGNOSIS — R53.83 FATIGUE, UNSPECIFIED TYPE: ICD-10-CM

## 2022-12-19 DIAGNOSIS — E11.9 TYPE 2 DIABETES MELLITUS WITHOUT COMPLICATION, WITH LONG-TERM CURRENT USE OF INSULIN: ICD-10-CM

## 2022-12-19 DIAGNOSIS — Z12.4 ENCOUNTER FOR SCREENING FOR CERVICAL CANCER: Primary | ICD-10-CM

## 2022-12-19 PROCEDURE — 99214 OFFICE O/P EST MOD 30 MIN: CPT | Performed by: NURSE PRACTITIONER

## 2022-12-19 RX ORDER — BLOOD-GLUCOSE SENSOR
EACH MISCELLANEOUS
COMMUNITY
Start: 2022-09-13

## 2022-12-19 RX ORDER — DAPAGLIFLOZIN 10 MG/1
TABLET, FILM COATED ORAL
COMMUNITY
Start: 2022-12-11

## 2022-12-19 NOTE — PROGRESS NOTES
"Chief Complaint  Annual Exam and Gynecologic Exam    Subjective        Eunice Ennis presents to [facility]  History of Present Illness  Patient presents to the office today for GYN exam   Eunice Ennis 61 y.o. female  GYN exam.  She is a postmenopausal female.  History of abnormal Pap smear: no  Family history of uterine or ovarian cancer: no  Family history of colon cancer: no  History of abnormal mammogram: no  Family history of breast cancer: no          Objective   Vital Signs:  /84 (BP Location: Left arm, Patient Position: Sitting)   Pulse 70   Temp 98.6 °F (37 °C) (Infrared)   Resp 18   Ht 172.7 cm (67.99\")   Wt 106 kg (233 lb 9.6 oz)   SpO2 99%   BMI 35.53 kg/m²   Estimated body mass index is 35.53 kg/m² as calculated from the following:    Height as of this encounter: 172.7 cm (67.99\").    Weight as of this encounter: 106 kg (233 lb 9.6 oz).    Class 2 Severe Obesity (BMI >=35 and <=39.9). Obesity-related health conditions include the following: hypertension, diabetes mellitus and dyslipidemias. Obesity is improving with treatment. BMI is is above average; BMI management plan is completed. We discussed portion control and increasing exercise.      Physical Exam  Constitutional:       General: She is not in acute distress.     Appearance: Normal appearance. She is not ill-appearing.   HENT:      Head: Normocephalic.   Cardiovascular:      Rate and Rhythm: Normal rate and regular rhythm.      Heart sounds: Normal heart sounds.   Pulmonary:      Effort: Pulmonary effort is normal. No respiratory distress.      Breath sounds: Normal breath sounds. No wheezing or rales.   Abdominal:      General: Abdomen is flat. Bowel sounds are normal. There is no distension.      Palpations: Abdomen is soft. There is no mass.      Tenderness: There is no abdominal tenderness.      Hernia: No hernia is present. There is no hernia in the left inguinal area or right inguinal " area.   Genitourinary:     General: Normal vulva.      Pubic Area: No rash or pubic lice.       Labia:         Right: No rash, tenderness, lesion or injury.         Left: No rash, tenderness, lesion or injury.       Urethra: No prolapse, urethral pain, urethral swelling or urethral lesion.      Vagina: No vaginal discharge.      Rectum: Normal. Guaiac result negative.   Neurological:      Mental Status: She is alert.   Psychiatric:         Mood and Affect: Mood normal.         Speech: Speech normal.         Behavior: Behavior normal.        Result Review :  The following data was reviewed by: JOHNNY Shepherd on 12/19/2022:  Common labs    Common Labs 7/12/22 7/12/22 7/12/22 12/20/22 12/20/22 12/20/22 12/20/22    0850 0850 0850 0900 0900 0900 0900   Glucose  330 (A)   231 (A)     BUN  21 (A)   20     Creatinine  0.94   0.94     Sodium  139   142     Potassium  4.8   5.4 (A)     Chloride  102   103     Calcium  9.5   9.7     Total Protein     7.6     Albumin  4.20   4.5     Total Bilirubin  0.4   0.5     Alkaline Phosphatase  88   81     AST (SGOT)  20   18     ALT (SGPT)  28   22     WBC 7.60   7.0      Hemoglobin 14.6   14.8      Hematocrit 44.7   45.2      Platelets 238   226      Total Cholesterol   204 (A)       Total Cholesterol      212 (A)    Triglycerides   143   125    HDL Cholesterol   73 (A)   67    LDL Cholesterol    106 (A)   123 (A)    Hemoglobin A1C       10.6 (A)   (A) Abnormal value       Comments are available for some flowsheets but are not being displayed.                    Assessment and Plan   Diagnoses and all orders for this visit:    1. Encounter for screening for cervical cancer (Primary)  -     IGP, Aptima HPV, Rfx 16 / 18,45    2. Mixed hyperlipidemia  -     Cancel: Comprehensive Metabolic Panel; Future  -     Cancel: Lipid Panel; Future    3. Other specified hypothyroidism  -     Cancel: Thyroid Panel With TSH; Future    4. Type 2 diabetes mellitus without complication, with  long-term current use of insulin (HCC)  -     Cancel: Hemoglobin A1c; Future  -     Cancel: MicroAlbumin, Urine, Random - Urine, Clean Catch; Future    5. Fatigue, unspecified type  -     Cancel: CBC & Differential; Future  -     Cancel: Comprehensive Metabolic Panel; Future  -     Cancel: Thyroid Panel With TSH; Future           I spent 30 minutes caring for Eunice on this date of service. This time includes time spent by me in the following activities:preparing for the visit, reviewing tests, obtaining and/or reviewing a separately obtained history, performing a medically appropriate examination and/or evaluation , counseling and educating the patient/family/caregiver, ordering medications, tests, or procedures and documenting information in the medical record  Follow Up   Return in about 4 weeks (around 1/16/2023) for Recheck.  Patient was given instructions and counseling regarding her condition or for health maintenance advice. Please see specific information pulled into the AVS if appropriate.

## 2022-12-20 ENCOUNTER — LAB (OUTPATIENT)
Dept: FAMILY MEDICINE CLINIC | Facility: CLINIC | Age: 62
End: 2022-12-20

## 2022-12-20 DIAGNOSIS — E03.8 OTHER SPECIFIED HYPOTHYROIDISM: ICD-10-CM

## 2022-12-20 DIAGNOSIS — R53.83 FATIGUE, UNSPECIFIED TYPE: ICD-10-CM

## 2022-12-20 DIAGNOSIS — E78.2 MIXED HYPERLIPIDEMIA: ICD-10-CM

## 2022-12-20 DIAGNOSIS — E11.9 TYPE 2 DIABETES MELLITUS WITHOUT COMPLICATION, WITH LONG-TERM CURRENT USE OF INSULIN: ICD-10-CM

## 2022-12-20 DIAGNOSIS — Z79.4 TYPE 2 DIABETES MELLITUS WITHOUT COMPLICATION, WITH LONG-TERM CURRENT USE OF INSULIN: ICD-10-CM

## 2022-12-20 DIAGNOSIS — F41.9 ANXIETY: Primary | ICD-10-CM

## 2022-12-21 LAB
ALBUMIN SERPL-MCNC: 4.5 G/DL (ref 3.8–4.9)
ALBUMIN/GLOB SERPL: 1.5 {RATIO} (ref 1.2–2.2)
ALP SERPL-CCNC: 81 IU/L (ref 44–121)
ALT SERPL-CCNC: 22 IU/L (ref 0–32)
AST SERPL-CCNC: 18 IU/L (ref 0–40)
BASOPHILS # BLD AUTO: 0.1 X10E3/UL (ref 0–0.2)
BASOPHILS NFR BLD AUTO: 1 %
BILIRUB SERPL-MCNC: 0.5 MG/DL (ref 0–1.2)
BUN SERPL-MCNC: 20 MG/DL (ref 8–27)
BUN/CREAT SERPL: 21 (ref 12–28)
CALCIUM SERPL-MCNC: 9.7 MG/DL (ref 8.7–10.3)
CHLORIDE SERPL-SCNC: 103 MMOL/L (ref 96–106)
CHOLEST SERPL-MCNC: 212 MG/DL (ref 100–199)
CO2 SERPL-SCNC: 20 MMOL/L (ref 20–29)
CREAT SERPL-MCNC: 0.94 MG/DL (ref 0.57–1)
EGFRCR SERPLBLD CKD-EPI 2021: 69 ML/MIN/1.73
EOSINOPHIL # BLD AUTO: 0.4 X10E3/UL (ref 0–0.4)
EOSINOPHIL NFR BLD AUTO: 5 %
ERYTHROCYTE [DISTWIDTH] IN BLOOD BY AUTOMATED COUNT: 11.7 % (ref 11.7–15.4)
FT4I SERPL CALC-MCNC: 2.4 (ref 1.2–4.9)
GLOBULIN SER CALC-MCNC: 3.1 G/DL (ref 1.5–4.5)
GLUCOSE SERPL-MCNC: 231 MG/DL (ref 70–99)
HBA1C MFR BLD: 10.6 % (ref 4.8–5.6)
HCT VFR BLD AUTO: 45.2 % (ref 34–46.6)
HDLC SERPL-MCNC: 67 MG/DL
HGB BLD-MCNC: 14.8 G/DL (ref 11.1–15.9)
IMM GRANULOCYTES # BLD AUTO: 0 X10E3/UL (ref 0–0.1)
IMM GRANULOCYTES NFR BLD AUTO: 0 %
LDLC SERPL CALC-MCNC: 123 MG/DL (ref 0–99)
LYMPHOCYTES # BLD AUTO: 2.8 X10E3/UL (ref 0.7–3.1)
LYMPHOCYTES NFR BLD AUTO: 40 %
MCH RBC QN AUTO: 29.7 PG (ref 26.6–33)
MCHC RBC AUTO-ENTMCNC: 32.7 G/DL (ref 31.5–35.7)
MCV RBC AUTO: 91 FL (ref 79–97)
MONOCYTES # BLD AUTO: 0.5 X10E3/UL (ref 0.1–0.9)
MONOCYTES NFR BLD AUTO: 8 %
NEUTROPHILS # BLD AUTO: 3.2 X10E3/UL (ref 1.4–7)
NEUTROPHILS NFR BLD AUTO: 46 %
PLATELET # BLD AUTO: 226 X10E3/UL (ref 150–450)
POTASSIUM SERPL-SCNC: 5.4 MMOL/L (ref 3.5–5.2)
PROT SERPL-MCNC: 7.6 G/DL (ref 6–8.5)
RBC # BLD AUTO: 4.99 X10E6/UL (ref 3.77–5.28)
SODIUM SERPL-SCNC: 142 MMOL/L (ref 134–144)
T3RU NFR SERPL: 28 % (ref 24–39)
T4 SERPL-MCNC: 8.5 UG/DL (ref 4.5–12)
TRIGL SERPL-MCNC: 125 MG/DL (ref 0–149)
TSH SERPL DL<=0.005 MIU/L-ACNC: 3.11 UIU/ML (ref 0.45–4.5)
VLDLC SERPL CALC-MCNC: 22 MG/DL (ref 5–40)
WBC # BLD AUTO: 7 X10E3/UL (ref 3.4–10.8)

## 2022-12-27 LAB
CYTOLOGIST CVX/VAG CYTO: NORMAL
CYTOLOGY CVX/VAG DOC CYTO: NORMAL
CYTOLOGY CVX/VAG DOC THIN PREP: NORMAL
DX ICD CODE: NORMAL
HIV 1 & 2 AB SER-IMP: NORMAL
HPV GENOTYPE REFLEX: NORMAL
HPV I/H RISK 4 DNA CVX QL PROBE+SIG AMP: NEGATIVE
Lab: NORMAL
OTHER STN SPEC: NORMAL
STAT OF ADQ CVX/VAG CYTO-IMP: NORMAL

## 2022-12-27 RX ORDER — ESCITALOPRAM OXALATE 20 MG/1
20 TABLET ORAL DAILY
Qty: 30 TABLET | Refills: 5 | Status: SHIPPED | OUTPATIENT
Start: 2022-12-27

## 2022-12-27 RX ORDER — BUSPIRONE HYDROCHLORIDE 10 MG/1
10 TABLET ORAL 2 TIMES DAILY
Qty: 60 TABLET | Refills: 5 | Status: SHIPPED | OUTPATIENT
Start: 2022-12-27

## 2022-12-27 RX ORDER — ALBUTEROL SULFATE 90 UG/1
2 AEROSOL, METERED RESPIRATORY (INHALATION) EVERY 4 HOURS PRN
Qty: 18 G | Refills: 5 | Status: SHIPPED | OUTPATIENT
Start: 2022-12-27

## 2022-12-28 DIAGNOSIS — E87.5 HYPERKALEMIA: Primary | ICD-10-CM

## 2023-02-10 ENCOUNTER — TELEPHONE (OUTPATIENT)
Dept: GASTROENTEROLOGY | Facility: CLINIC | Age: 63
End: 2023-02-10
Payer: COMMERCIAL

## 2023-02-10 ENCOUNTER — TELEPHONE (OUTPATIENT)
Dept: FAMILY MEDICINE CLINIC | Facility: CLINIC | Age: 63
End: 2023-02-10

## 2023-02-10 NOTE — TELEPHONE ENCOUNTER
Caller: Eunice Ennis    Relationship to patient: Self    Best call back number: [phone]    New or established patient?  [] New  [x] Established    Date of discharge: 2/7/23    Facility discharged from: [facility]    Diagnosis/Symptoms: VOMITING, ABDOMINAL PAIN    PATIENT HAS HOSPITAL FOLLOW UP SCHEDULED FOR TODAY 2/10/23

## 2023-02-10 NOTE — TELEPHONE ENCOUNTER
Caller: Eunice Ennis    Relationship to patient: Self    Best call back number: [phone]    Chief complaint:PT WENT TO ER FOR VOMITING TUES ON WED, AND THURSDAY SHE TOOK NAUSEA MEDICATION BUT NOW SHE BELCHING DARK BROWN.     Type of visit: OFFICE VISIT    Requested date: TODAY

## 2023-02-10 NOTE — TELEPHONE ENCOUNTER
----- Message from David Rivas sent at 2/10/2023  8:08 AM EST -----  Contact: [phone]  Went to  for vomiting on Tuesday Wed and thurs vomiting to the medicine they gave her now     belching up brown stuff w/stomach pain since yesterday please call

## 2023-02-10 NOTE — TELEPHONE ENCOUNTER
[facility] in with Karmen Navas or Rosita.

## 2023-11-14 ENCOUNTER — TELEPHONE (OUTPATIENT)
Dept: GASTROENTEROLOGY | Facility: CLINIC | Age: 63
End: 2023-11-14
Payer: COMMERCIAL

## 2023-11-14 NOTE — TELEPHONE ENCOUNTER
Caller: Eunice Ennis    Relationship: Self    Best call back number: [phone]    Requested Prescriptions:   pantoprazole (PROTONIX) 40 MG EC tablet        Pharmacy where request should be sent:    [facility] - ([facility]) - [phone]    Last office visit with prescribing clinician: 7/11/2022   Last telemedicine visit with prescribing clinician: Visit date not found   Next office visit with prescribing clinician: Visit date not found     Additional details provided by patient: PT HAS 2 DAYS LEFT OF MEDS. PT SAID [facility] HAS BEEN REACHING OUT TO US BUT NO RESPONSE. PT HAS AN APPT WITH [facility] ON 12/19/2023 @ 1PM.    Does the patient have less than a 3 day supply:  [x] Yes  [] No    Would you like a call back once the refill request has been completed: [x] Yes [] No    If the office needs to give you a call back, can they leave a voicemail: [x] Yes [] No    David Au Rep   11/14/23 16:23 EST

## 2023-11-15 RX ORDER — PANTOPRAZOLE SODIUM 40 MG/1
40 TABLET, DELAYED RELEASE ORAL DAILY
Qty: 90 TABLET | Refills: 0 | Status: SHIPPED | OUTPATIENT
Start: 2023-11-15 | End: 2023-11-16

## 2023-11-15 NOTE — TELEPHONE ENCOUNTER
Hub staff attempted to follow warm transfer process and was unsuccessful     Caller: Eunice Ennis    Relationship to patient: Self    Best call back number: [phone]    Patient is needing: PATIENT REACHED OUT TO PHARMACY AND THEY ADVISED THAT THE OFFICE HAS NOT YET SENT IN REFILL. PATIENT REQUEST THAT REFILL BE SENT TO [facility]: [address] [place], [place], [address]   PHONE: [phone]

## 2023-11-16 RX ORDER — PANTOPRAZOLE SODIUM 40 MG/1
40 TABLET, DELAYED RELEASE ORAL DAILY
Qty: 30 TABLET | Refills: 1 | Status: SHIPPED | OUTPATIENT
Start: 2023-11-16

## 2023-11-16 RX ORDER — PANTOPRAZOLE SODIUM 40 MG/1
40 TABLET, DELAYED RELEASE ORAL DAILY
Qty: 30 TABLET | Refills: 1 | Status: SHIPPED | OUTPATIENT
Start: 2023-11-16 | End: 2023-11-16 | Stop reason: SDUPTHER

## 2023-11-16 NOTE — TELEPHONE ENCOUNTER
Patient called. Advised a pantoprazole script has been sent to her [facility] pharmacy.

## 2023-12-19 ENCOUNTER — OFFICE VISIT (OUTPATIENT)
Dept: GASTROENTEROLOGY | Facility: CLINIC | Age: 63
End: 2023-12-19
Payer: COMMERCIAL

## 2023-12-19 ENCOUNTER — OFFICE VISIT (OUTPATIENT)
Dept: FAMILY MEDICINE CLINIC | Facility: CLINIC | Age: 63
End: 2023-12-19
Payer: COMMERCIAL

## 2023-12-19 VITALS
OXYGEN SATURATION: 95 % | RESPIRATION RATE: 14 BRPM | DIASTOLIC BLOOD PRESSURE: 68 MMHG | HEART RATE: 77 BPM | BODY MASS INDEX: 36.37 KG/M2 | WEIGHT: 240 LBS | SYSTOLIC BLOOD PRESSURE: 120 MMHG | HEIGHT: 68 IN | TEMPERATURE: 98.2 F

## 2023-12-19 VITALS
DIASTOLIC BLOOD PRESSURE: 81 MMHG | WEIGHT: 239.8 LBS | HEIGHT: 68 IN | HEART RATE: 74 BPM | OXYGEN SATURATION: 98 % | TEMPERATURE: 96.9 F | BODY MASS INDEX: 36.34 KG/M2 | SYSTOLIC BLOOD PRESSURE: 143 MMHG

## 2023-12-19 DIAGNOSIS — E11.69 TYPE 2 DIABETES MELLITUS WITH OTHER SPECIFIED COMPLICATION, WITH LONG-TERM CURRENT USE OF INSULIN: ICD-10-CM

## 2023-12-19 DIAGNOSIS — Z79.4 TYPE 2 DIABETES MELLITUS WITH OTHER SPECIFIED COMPLICATION, WITH LONG-TERM CURRENT USE OF INSULIN: ICD-10-CM

## 2023-12-19 DIAGNOSIS — Z00.00 ANNUAL PHYSICAL EXAM: Primary | ICD-10-CM

## 2023-12-19 DIAGNOSIS — K21.9 GASTROESOPHAGEAL REFLUX DISEASE, UNSPECIFIED WHETHER ESOPHAGITIS PRESENT: Primary | ICD-10-CM

## 2023-12-19 RX ORDER — PANTOPRAZOLE SODIUM 40 MG/1
40 TABLET, DELAYED RELEASE ORAL DAILY
Qty: 90 TABLET | Refills: 3 | Status: SHIPPED | OUTPATIENT
Start: 2023-12-19

## 2023-12-19 NOTE — PROGRESS NOTES
"Chief Complaint  Establish Care and Annual Exam    Subjective        Eunice Ennis presents to [facility]  History of Present Illness  Patient is here to establish care. She is not concerned with any thing new today. She does see GI, podiatrist, endocrinology, urology. She just seen endocrinology, Dr. Garcia, last week. She just recently had abscessed left toe removed and her A1c improved.  The wound from surgery is healed, but was slightly reddened and tender so surgery has her on antibiotic, she is not sure of the name. Denies numbness and tingling in feet. She gets foot exams and A1c's routinely through other doctors. She is not interested in hep c screening at this time.   Objective   Vital Signs:  /68 (BP Location: Right arm, Patient Position: Sitting, Cuff Size: Adult)   Pulse 77   Temp 98.2 °F (36.8 °C) (Temporal)   Resp 14   Ht 172.7 cm (67.99\")   Wt 109 kg (240 lb)   SpO2 95%   BMI 36.50 kg/m²   Estimated body mass index is 36.5 kg/m² as calculated from the following:    Height as of this encounter: 172.7 cm (67.99\").    Weight as of this encounter: 109 kg (240 lb).             Physical Exam  Constitutional:       Appearance: Normal appearance.   HENT:      Head: Normocephalic.   Eyes:      Extraocular Movements: Extraocular movements intact.      Pupils: Pupils are equal, round, and reactive to light.   Cardiovascular:      Rate and Rhythm: Normal rate and regular rhythm.   Pulmonary:      Effort: Pulmonary effort is normal.      Breath sounds: Normal breath sounds.   Musculoskeletal:         General: Normal range of motion.      Cervical back: Normal range of motion.   Skin:     General: Skin is warm and dry.   Neurological:      General: No focal deficit present.      Mental Status: She is alert and oriented to person, place, and time.   Psychiatric:         Mood and Affect: Mood normal.        Result Review :                   Assessment and Plan "   Diagnoses and all orders for this visit:    1. Annual physical exam (Primary)    2. Type 2 diabetes mellitus with other specified complication, with long-term current use of insulin        Encouraged patient to see ophthalmologist for diabetic eye exam.        Follow Up   Return in about 1 year (around 12/19/2024) for Annual physical.  Patient was given instructions and counseling regarding her condition or for health maintenance advice. Please see specific information pulled into the AVS if appropriate.

## 2023-12-19 NOTE — PROGRESS NOTES
"Chief Complaint  Heartburn    Subjective          History Of Present Illness:    Eunice Ennis is a  62 y.o. female patient of Dr. Miller with a history of  GERD.    Patient reports she is doing well currently on pantoprazole daily.  She denies any heartburn, dysphagia, odynophagia, abdominal pain, nausea, vomiting.  Appetite is good and weight is stable.    She denies any lower GI symptoms.  She will have some occasional intermittent diarrhea that resolves with diet.  No diarrhea, melena, hematochezia    Additional data reviewed:  C-scope 2019 - normal. Recall 10 years.     Objective   Vital Signs:   /81   Pulse 74   Temp 96.9 °F (36.1 °C)   Ht 172.7 cm (68\")   Wt 109 kg (239 lb 12.8 oz)   SpO2 98%   BMI 36.46 kg/m²       Physical Exam  Vitals reviewed.   Constitutional:       General: She is not in acute distress.     Appearance: Normal appearance. She is not ill-appearing.   HENT:      Head: Normocephalic and atraumatic.      Nose: Nose normal.      Mouth/Throat:      Pharynx: Oropharynx is clear.   Eyes:      Extraocular Movements: Extraocular movements intact.      Conjunctiva/sclera: Conjunctivae normal.      Pupils: Pupils are equal, round, and reactive to light.   Pulmonary:      Effort: Pulmonary effort is normal.   Abdominal:      General: There is no distension.      Palpations: Abdomen is soft. There is no mass.      Tenderness: There is no abdominal tenderness.   Musculoskeletal:         General: No swelling. Normal range of motion.      Cervical back: Normal range of motion.   Skin:     General: Skin is warm and dry.      Findings: No bruising or rash.   Neurological:      General: No focal deficit present.      Mental Status: She is alert and oriented to person, place, and time.      Motor: No weakness.      Gait: Gait normal.   Psychiatric:         Mood and Affect: Mood normal.          Result Review :   The following data was reviewed by: Mary Ruiz PA-C on " 12/19/2023:    CBC          10/6/2023    04:20 10/7/2023    02:53 10/8/2023    04:40   CBC   WBC 7.95     9.23        RBC 4.06     3.99        Hemoglobin 12.1     11.7        Hematocrit 36.4     36.3        MCV 89.7     91.0        MCH 29.8     29.3        MCHC 33.2     32.2        RDW 12.8     12.7        Platelets 226     227     203          Details          This result is from an external source.                   Assessment and Plan    Diagnoses and all orders for this visit:    1. Gastroesophageal reflux disease, unspecified whether esophagitis present (Primary)  -     pantoprazole (PROTONIX) 40 MG EC tablet; Take 1 tablet by mouth Daily.  Dispense: 90 tablet; Refill: 3       Continue pantoprazole once daily  No further issues at this time.    Follow Up   Return in about 1 year (around 12/19/2024) for Dr. Trimble or Karmen Navas PA-C.    Dragon dictation used throughout this note.            Mary Jamison PA-C   [facility]  [address] [place] [address]  Office: [phone]

## 2024-01-11 DIAGNOSIS — K21.9 GASTROESOPHAGEAL REFLUX DISEASE, UNSPECIFIED WHETHER ESOPHAGITIS PRESENT: ICD-10-CM

## 2024-01-15 RX ORDER — PANTOPRAZOLE SODIUM 40 MG/1
40 TABLET, DELAYED RELEASE ORAL DAILY
Qty: 90 TABLET | Refills: 2 | Status: SHIPPED | OUTPATIENT
Start: 2024-01-15

## 2024-01-22 ENCOUNTER — OFFICE VISIT (OUTPATIENT)
Dept: FAMILY MEDICINE CLINIC | Facility: CLINIC | Age: 64
End: 2024-01-22
Payer: COMMERCIAL

## 2024-01-22 VITALS
OXYGEN SATURATION: 95 % | DIASTOLIC BLOOD PRESSURE: 79 MMHG | WEIGHT: 239.8 LBS | TEMPERATURE: 97.3 F | HEART RATE: 76 BPM | HEIGHT: 68 IN | BODY MASS INDEX: 36.34 KG/M2 | SYSTOLIC BLOOD PRESSURE: 143 MMHG

## 2024-01-22 DIAGNOSIS — E66.9 OBESITY (BMI 30-39.9): ICD-10-CM

## 2024-01-22 DIAGNOSIS — Z79.4 TYPE 2 DIABETES MELLITUS WITH OTHER SPECIFIED COMPLICATION, WITH LONG-TERM CURRENT USE OF INSULIN: Primary | ICD-10-CM

## 2024-01-22 DIAGNOSIS — E11.69 TYPE 2 DIABETES MELLITUS WITH OTHER SPECIFIED COMPLICATION, WITH LONG-TERM CURRENT USE OF INSULIN: Primary | ICD-10-CM

## 2024-01-22 PROCEDURE — 99214 OFFICE O/P EST MOD 30 MIN: CPT | Performed by: NURSE PRACTITIONER

## 2024-01-22 RX ORDER — INSULIN LISPRO 100 [IU]/ML
1 INJECTION, SOLUTION INTRAVENOUS; SUBCUTANEOUS CONTINUOUS
COMMUNITY

## 2024-01-22 NOTE — PROGRESS NOTES
"Chief Complaint  discuss weight loss medications    Subjective        Eunice Ennis presents to [facility]  History of Present Illness  Patient is here to discuss weight loss drugs. She is super concerned for her continued inability to lose weight. Patient states she has tried ozempic injections and had severe belching. She is wanting to try other medications for weight loss. She has tried eating plans including weight watchers with no weight loss. She sees endocrinologist in June, last routine labs in September/October. She does wear an insulin pump and it doesn't seem to be giving her a bolus as often as it did.   Objective   Vital Signs:  /79 (BP Location: Left arm, Patient Position: Sitting, Cuff Size: Large Adult)   Pulse 76   Temp 97.3 °F (36.3 °C) (Temporal)   Ht 172.7 cm (68\")   Wt 109 kg (239 lb 12.8 oz)   SpO2 95%   BMI 36.46 kg/m²   Estimated body mass index is 36.46 kg/m² as calculated from the following:    Height as of this encounter: 172.7 cm (68\").    Weight as of this encounter: 109 kg (239 lb 12.8 oz).               Physical Exam  Constitutional:       Appearance: Normal appearance.   HENT:      Head: Normocephalic.   Eyes:      Extraocular Movements: Extraocular movements intact.      Pupils: Pupils are equal, round, and reactive to light.   Cardiovascular:      Rate and Rhythm: Normal rate and regular rhythm.      Heart sounds: Normal heart sounds.   Pulmonary:      Effort: Pulmonary effort is normal.      Breath sounds: Normal breath sounds.   Musculoskeletal:         General: Normal range of motion.      Cervical back: Normal range of motion.   Skin:     General: Skin is warm and dry.   Neurological:      General: No focal deficit present.      Mental Status: She is alert and oriented to person, place, and time.   Psychiatric:         Mood and Affect: Mood normal.        Result Review :                     Assessment and Plan     Diagnoses and all " orders for this visit:    1. Type 2 diabetes mellitus with other specified complication, with long-term current use of insulin (Primary)  -     Hemoglobin A1c  -     TSH Rfx On Abnormal To Free T4  -     Tirzepatide (MOUNJARO) 2.5 MG/0.5ML solution pen-injector pen; Inject 0.5 mL under the skin into the appropriate area as directed 1 (One) Time Per Week.  Dispense: 2 mL; Refill: 2  -     Microalbumin / Creatinine Urine Ratio - Urine, Clean Catch  -     Lipid panel    2. Obesity (BMI 30-39.9)             Follow Up     Return in about 3 months (around 4/22/2024) for Recheck.  Patient was given instructions and counseling regarding her condition or for health maintenance advice. Please see specific information pulled into the AVS if appropriate.

## 2024-01-23 LAB
CHOLEST SERPL-MCNC: 181 MG/DL (ref 100–199)
CREAT UR-MCNC: NORMAL MG/DL
HBA1C MFR BLD: 7.9 % (ref 4.8–5.6)
HDLC SERPL-MCNC: 54 MG/DL
LDLC SERPL CALC-MCNC: 104 MG/DL (ref 0–99)
MICROALBUMIN UR-MCNC: NORMAL
SPECIMEN STATUS: NORMAL
TRIGL SERPL-MCNC: 133 MG/DL (ref 0–149)
TSH SERPL DL<=0.005 MIU/L-ACNC: 0.73 UIU/ML (ref 0.45–4.5)
VLDLC SERPL CALC-MCNC: 23 MG/DL (ref 5–40)

## 2024-01-29 ENCOUNTER — TELEPHONE (OUTPATIENT)
Dept: FAMILY MEDICINE CLINIC | Facility: CLINIC | Age: 64
End: 2024-01-29

## 2024-01-29 NOTE — TELEPHONE ENCOUNTER
Caller: Eunice Ennis    Relationship: Self    Best call back number: [phone]     What medication are you requesting: TRULICITY    Have you had these symptoms before:    [x] Yes  [] No    Have you been treated for these symptoms before:   [x] Yes  [] No    If a prescription is needed, what is your preferred pharmacy and phone number: [facility] [id number] - [place] [id number] [facility] [address] [facility] [phone]  - [phone] FX     Additional notes:  PATIENT TRIED TAKING THE MEDICATION:    Tirzepatide (MOUNJARO) 2.5 MG/0.5ML solution pen-injector   pen     SHE HAD THE SAME SIDE EFFECT FROM THAT AS SHE DID OZEMPIC.     SHE WOULD LIKE TO TRY TRULICITY IF THAT IS APPROPRIATE. SHE SPOKE TO HER INSURANCE AND TRULICITY IS COVERED BUT WILL REQUIRE A PA.

## 2024-04-22 ENCOUNTER — TELEPHONE (OUTPATIENT)
Dept: FAMILY MEDICINE CLINIC | Facility: CLINIC | Age: 64
End: 2024-04-22

## 2024-04-22 NOTE — TELEPHONE ENCOUNTER
FYI:    Patient called to make a same day appointment but was informed there was no availability. She was discharged from [facility] on 4/19 and was told she had high BP and needed medication from her pcp. She said this was the only day she would be able to do it and declined to make a future appointment.

## 2024-04-30 ENCOUNTER — OFFICE VISIT (OUTPATIENT)
Dept: FAMILY MEDICINE CLINIC | Facility: CLINIC | Age: 64
End: 2024-04-30
Payer: COMMERCIAL

## 2024-04-30 VITALS
HEART RATE: 78 BPM | BODY MASS INDEX: 39.1 KG/M2 | WEIGHT: 258 LBS | HEIGHT: 68 IN | SYSTOLIC BLOOD PRESSURE: 130 MMHG | DIASTOLIC BLOOD PRESSURE: 70 MMHG | OXYGEN SATURATION: 95 % | TEMPERATURE: 97.5 F

## 2024-04-30 DIAGNOSIS — Z79.4 TYPE 2 DIABETES MELLITUS WITH OTHER SPECIFIED COMPLICATION, WITH LONG-TERM CURRENT USE OF INSULIN: ICD-10-CM

## 2024-04-30 DIAGNOSIS — K21.9 GASTROESOPHAGEAL REFLUX DISEASE, UNSPECIFIED WHETHER ESOPHAGITIS PRESENT: ICD-10-CM

## 2024-04-30 DIAGNOSIS — F41.9 ANXIETY: ICD-10-CM

## 2024-04-30 DIAGNOSIS — J45.20 MILD INTERMITTENT ASTHMA, UNSPECIFIED WHETHER COMPLICATED: ICD-10-CM

## 2024-04-30 DIAGNOSIS — E11.69 TYPE 2 DIABETES MELLITUS WITH OTHER SPECIFIED COMPLICATION, WITH LONG-TERM CURRENT USE OF INSULIN: ICD-10-CM

## 2024-04-30 DIAGNOSIS — I10 PRIMARY HYPERTENSION: Primary | ICD-10-CM

## 2024-04-30 DIAGNOSIS — Z90.49 STATUS POST APPENDECTOMY: ICD-10-CM

## 2024-04-30 PROCEDURE — 99213 OFFICE O/P EST LOW 20 MIN: CPT | Performed by: NURSE PRACTITIONER

## 2024-04-30 RX ORDER — SOLIFENACIN SUCCINATE 5 MG/1
5 TABLET, FILM COATED ORAL DAILY
Qty: 90 TABLET | Refills: 1 | Status: SHIPPED | OUTPATIENT
Start: 2024-04-30

## 2024-04-30 RX ORDER — ESCITALOPRAM OXALATE 20 MG/1
20 TABLET ORAL DAILY
Qty: 30 TABLET | Refills: 5 | Status: SHIPPED | OUTPATIENT
Start: 2024-04-30

## 2024-04-30 RX ORDER — LISINOPRIL 10 MG/1
10 TABLET ORAL DAILY
Qty: 90 TABLET | Refills: 1 | Status: SHIPPED | OUTPATIENT
Start: 2024-04-30

## 2024-04-30 RX ORDER — DAPAGLIFLOZIN 10 MG/1
10 TABLET, FILM COATED ORAL DAILY
Qty: 90 TABLET | Refills: 1 | Status: SHIPPED | OUTPATIENT
Start: 2024-04-30

## 2024-04-30 RX ORDER — ALBUTEROL SULFATE 90 UG/1
2 AEROSOL, METERED RESPIRATORY (INHALATION) EVERY 4 HOURS PRN
Qty: 18 G | Refills: 5 | Status: SHIPPED | OUTPATIENT
Start: 2024-04-30

## 2024-04-30 RX ORDER — LORATADINE 10 MG/1
10 TABLET ORAL DAILY
Qty: 30 TABLET | Refills: 5 | Status: SHIPPED | OUTPATIENT
Start: 2024-04-30

## 2024-04-30 RX ORDER — LEVOTHYROXINE SODIUM 0.2 MG/1
200 TABLET ORAL DAILY
Qty: 90 TABLET | Refills: 1 | Status: SHIPPED | OUTPATIENT
Start: 2024-04-30

## 2024-04-30 RX ORDER — PANTOPRAZOLE SODIUM 40 MG/1
40 TABLET, DELAYED RELEASE ORAL DAILY
Qty: 90 TABLET | Refills: 2 | Status: SHIPPED | OUTPATIENT
Start: 2024-04-30

## 2024-04-30 RX ORDER — BUSPIRONE HYDROCHLORIDE 10 MG/1
10 TABLET ORAL 2 TIMES DAILY
Qty: 60 TABLET | Refills: 5 | Status: SHIPPED | OUTPATIENT
Start: 2024-04-30

## 2024-04-30 RX ORDER — ATORVASTATIN CALCIUM 20 MG/1
20 TABLET, FILM COATED ORAL NIGHTLY
Qty: 90 TABLET | Refills: 1 | Status: SHIPPED | OUTPATIENT
Start: 2024-04-30

## 2024-04-30 NOTE — PROGRESS NOTES
"Chief Complaint  ELEVATED BLOOD PRESSURE AT HOSPITAL    [facility]        Eunice Ennis presents to [facility]  History of Present Illness  Patient is here to discuss high blood pressure. She was admitted to [facility] for abdomen pain and vomiting, and had appendix removed laparoscopically. She was there for more than a few days and was told at discharge she had high blood pressure. Denies any complications after surgery.   Objective   Vital Signs:  /70 (BP Location: Right arm, Patient Position: Sitting, Cuff Size: Large Adult)   Pulse 78   Temp 97.5 °F (36.4 °C) (Temporal)   Ht 172.7 cm (68\")   Wt 117 kg (258 lb)   SpO2 95%   BMI 39.23 kg/m²   Estimated body mass index is 39.23 kg/m² as calculated from the following:    Height as of this encounter: 172.7 cm (68\").    Weight as of this encounter: 117 kg (258 lb).               Physical Exam  Constitutional:       Appearance: Normal appearance. She is obese.   HENT:      Head: Normocephalic.      Nose: Nose normal.   Eyes:      Extraocular Movements: Extraocular movements intact.      Pupils: Pupils are equal, round, and reactive to light.   Cardiovascular:      Rate and Rhythm: Normal rate and regular rhythm.      Heart sounds: Normal heart sounds.   Pulmonary:      Effort: Pulmonary effort is normal.      Breath sounds: Normal breath sounds.   Musculoskeletal:         General: Normal range of motion.      Cervical back: Normal range of motion.   Skin:     General: Skin is warm and dry.   Neurological:      General: No focal deficit present.      Mental Status: She is alert and oriented to person, place, and time.   Psychiatric:         Mood and Affect: Mood normal.        Result Review :                     Assessment and Plan     Diagnoses and all orders for this visit:    1. Primary hypertension (Primary)  -     lisinopril (PRINIVIL,ZESTRIL) 10 MG tablet; Take 1 tablet by mouth Daily.  Dispense: 90 tablet; Refill: " 1    2. Status post appendectomy    3. Mild intermittent asthma, unspecified whether complicated  -     albuterol sulfate  (90 Base) MCG/ACT inhaler; Inhale 2 puffs Every 4 (Four) Hours As Needed for Wheezing.  Dispense: 18 g; Refill: 5    4. Anxiety  -     busPIRone (BUSPAR) 10 MG tablet; Take 1 tablet by mouth 2 (Two) Times a Day.  Dispense: 60 tablet; Refill: 5  -     escitalopram (LEXAPRO) 20 MG tablet; Take 1 tablet by mouth Daily.  Dispense: 30 tablet; Refill: 5    5. Gastroesophageal reflux disease, unspecified whether esophagitis present  -     pantoprazole (PROTONIX) 40 MG EC tablet; Take 1 tablet by mouth Daily.  Dispense: 90 tablet; Refill: 2    6. Type 2 diabetes mellitus with other specified complication, with long-term current use of insulin  -     Tirzepatide (MOUNJARO) 2.5 MG/0.5ML solution pen-injector pen; Inject 0.5 mL under the skin into the appropriate area as directed 1 (One) Time Per Week.  Dispense: 2 mL; Refill: 2    Other orders  -     atorvastatin (LIPITOR) 20 MG tablet; Take 1 tablet by mouth Every Night.  Dispense: 90 tablet; Refill: 1  -     Farxiga 10 MG tablet; Take 10 mg by mouth Daily.  Dispense: 90 tablet; Refill: 1  -     levothyroxine (SYNTHROID, LEVOTHROID) 200 MCG tablet; Take 1 tablet by mouth Daily.  Dispense: 90 tablet; Refill: 1  -     loratadine (Claritin) 10 MG tablet; Take 1 tablet by mouth Daily.  Dispense: 30 tablet; Refill: 5  -     metFORMIN (GLUCOPHAGE) 1000 MG tablet; Take 1 tablet by mouth Daily With Breakfast.  Dispense: 90 tablet; Refill: 1  -     VESIcare 5 MG tablet; Take 1 tablet by mouth Daily.  Dispense: 90 tablet; Refill: 1             Follow Up     Return in about 4 weeks (around 5/28/2024) for Video visit.  Patient was given instructions and counseling regarding her condition or for health maintenance advice. Please see specific information pulled into the AVS if appropriate.          actual

## 2024-06-03 ENCOUNTER — OFFICE VISIT (OUTPATIENT)
Dept: FAMILY MEDICINE CLINIC | Facility: CLINIC | Age: 64
End: 2024-06-03
Payer: COMMERCIAL

## 2024-06-03 VITALS
WEIGHT: 248.6 LBS | DIASTOLIC BLOOD PRESSURE: 70 MMHG | HEART RATE: 85 BPM | OXYGEN SATURATION: 97 % | TEMPERATURE: 97.7 F | HEIGHT: 68 IN | SYSTOLIC BLOOD PRESSURE: 130 MMHG | BODY MASS INDEX: 37.68 KG/M2

## 2024-06-03 DIAGNOSIS — E11.69 TYPE 2 DIABETES MELLITUS WITH OTHER SPECIFIED COMPLICATION, WITH LONG-TERM CURRENT USE OF INSULIN: ICD-10-CM

## 2024-06-03 DIAGNOSIS — I10 PRIMARY HYPERTENSION: Primary | ICD-10-CM

## 2024-06-03 DIAGNOSIS — Z79.4 TYPE 2 DIABETES MELLITUS WITH OTHER SPECIFIED COMPLICATION, WITH LONG-TERM CURRENT USE OF INSULIN: ICD-10-CM

## 2024-06-03 PROCEDURE — 99214 OFFICE O/P EST MOD 30 MIN: CPT | Performed by: NURSE PRACTITIONER

## 2024-06-03 NOTE — PROGRESS NOTES
"Chief Complaint  Hypertension    Subjective        Eunice Ennis presents to [facility]  History of Present Illness  Patient is here to follow up for blood pressure. She states she is taking blood pressure readings at home and they are typically around 130/70 and denies any ongoing issues with symptoms or medication side effects. She has appointments with endocrinology this week and sees podiatrist today. Reports of a cry cough since last week, believes it may be her allergies she restarted her daily allergy pill last week as well.     Objective   Vital Signs:  /70 (BP Location: Left arm, Patient Position: Sitting, Cuff Size: Large Adult)   Pulse 85   Temp 97.7 °F (36.5 °C) (Temporal)   Ht 172.7 cm (68\")   Wt 113 kg (248 lb 9.6 oz)   SpO2 97%   BMI 37.80 kg/m²   Estimated body mass index is 37.8 kg/m² as calculated from the following:    Height as of this encounter: 172.7 cm (68\").    Weight as of this encounter: 113 kg (248 lb 9.6 oz).               Physical Exam  Constitutional:       Appearance: Normal appearance.   HENT:      Head: Normocephalic.      Nose: Nose normal.   Eyes:      Extraocular Movements: Extraocular movements intact.      Pupils: Pupils are equal, round, and reactive to light.   Cardiovascular:      Rate and Rhythm: Normal rate and regular rhythm.      Heart sounds: Normal heart sounds.   Pulmonary:      Effort: Pulmonary effort is normal.   Musculoskeletal:         General: Normal range of motion.      Cervical back: Normal range of motion.   Skin:     General: Skin is warm and dry.   Neurological:      General: No focal deficit present.      Mental Status: She is alert and oriented to person, place, and time.   Psychiatric:         Mood and Affect: Mood normal.        Result Review :                     Assessment and Plan     Diagnoses and all orders for this visit:    1. Primary hypertension (Primary)    2. Type 2 diabetes mellitus with other " specified complication, with long-term current use of insulin             Follow Up     Return in about 6 months (around 12/3/2024) for Next scheduled follow up.  Patient was given instructions and counseling regarding her condition or for health maintenance advice. Please see specific information pulled into the AVS if appropriate.

## 2024-06-20 ENCOUNTER — TELEPHONE (OUTPATIENT)
Dept: FAMILY MEDICINE CLINIC | Facility: CLINIC | Age: 64
End: 2024-06-20

## 2024-06-20 DIAGNOSIS — I10 PRIMARY HYPERTENSION: Primary | ICD-10-CM

## 2024-06-20 RX ORDER — LOSARTAN POTASSIUM 25 MG/1
25 TABLET ORAL DAILY
Qty: 30 TABLET | Refills: 2 | Status: SHIPPED | OUTPATIENT
Start: 2024-06-20

## 2024-06-20 NOTE — TELEPHONE ENCOUNTER
Caller: Eunice Ennis    Relationship: Self    Best call back number: [phone]     Which medication are you concerned about: lisinopril (PRINIVIL,ZESTRIL) 10 MG tablet     Who prescribed you this medication: ROMAINE FLOWERS    When did you start taking this medication: IN APRIL    What are your concerns: PATIENT CALLING STATING THAT SHE IS STILL COUGHING, PATIENT STATES THAT THE COUGH SEEMS TO BE GETTING WORSE     How long have you had these concerns: ABOUT 2 DAYS AFTER TAKING THE MEDICATION

## 2024-10-18 ENCOUNTER — TRANSCRIBE ORDERS (OUTPATIENT)
Dept: ADMINISTRATIVE | Facility: HOSPITAL | Age: 64
End: 2024-10-18
Payer: COMMERCIAL

## 2024-10-18 DIAGNOSIS — Z12.31 SCREENING MAMMOGRAM, ENCOUNTER FOR: Primary | ICD-10-CM

## 2024-12-03 ENCOUNTER — HOSPITAL ENCOUNTER (OUTPATIENT)
Dept: MAMMOGRAPHY | Facility: HOSPITAL | Age: 64
Discharge: HOME | End: 2024-12-03
Admitting: NURSE PRACTITIONER
Payer: COMMERCIAL

## 2024-12-03 DIAGNOSIS — Z12.31 SCREENING MAMMOGRAM, ENCOUNTER FOR: ICD-10-CM

## 2024-12-03 PROCEDURE — 77063 BREAST TOMOSYNTHESIS BI: CPT

## 2024-12-03 PROCEDURE — 77067 SCR MAMMO BI INCL CAD: CPT

## 2025-02-23 DIAGNOSIS — I10 PRIMARY HYPERTENSION: ICD-10-CM

## 2025-02-24 RX ORDER — LOSARTAN POTASSIUM 25 MG/1
25 TABLET ORAL DAILY
Qty: 30 TABLET | Refills: 2 | Status: SHIPPED | OUTPATIENT
Start: 2025-02-24

## 2025-02-24 NOTE — TELEPHONE ENCOUNTER
The patient called and said she is completely out of this medication. She would like it sent to the [facility] on file.

## 2025-04-02 RX ORDER — LORATADINE 10 MG/1
10 TABLET ORAL DAILY
Qty: 30 TABLET | Refills: 0 | Status: SHIPPED | OUTPATIENT
Start: 2025-04-02

## 2025-05-05 RX ORDER — LORATADINE 10 MG/1
10 TABLET ORAL DAILY
Qty: 30 TABLET | Refills: 0 | Status: SHIPPED | OUTPATIENT
Start: 2025-05-05

## 2025-05-05 NOTE — TELEPHONE ENCOUNTER
Rx Refill Note  Requested Prescriptions     Pending Prescriptions Disp Refills    loratadine (CLARITIN) 10 MG tablet [Pharmacy Med Name: LORATADINE 10 MG TABLET] 30 tablet 0     Sig: TAKE 1 TABLET BY MOUTH DAILY      Last office visit with prescribing clinician: 6/3/2024   Last telemedicine visit with prescribing clinician: Visit date not found   Next office visit with prescribing clinician: Visit date not found                         Would you like a call back once the refill request has been completed: [] Yes [] No    If the office needs to give you a call back, can they leave a voicemail: [] Yes [] No    Verona Porter MA  05/05/25, 09:01 EDT    Patient needs appointment

## 2025-08-26 ENCOUNTER — OFFICE VISIT (OUTPATIENT)
Dept: FAMILY MEDICINE CLINIC | Facility: CLINIC | Age: 65
End: 2025-08-26
Payer: COMMERCIAL

## 2025-08-26 VITALS
HEART RATE: 68 BPM | WEIGHT: 256 LBS | OXYGEN SATURATION: 99 % | HEIGHT: 68 IN | BODY MASS INDEX: 38.8 KG/M2 | SYSTOLIC BLOOD PRESSURE: 130 MMHG | DIASTOLIC BLOOD PRESSURE: 60 MMHG | TEMPERATURE: 97.8 F

## 2025-08-26 DIAGNOSIS — E11.69 TYPE 2 DIABETES MELLITUS WITH OTHER SPECIFIED COMPLICATION, WITH LONG-TERM CURRENT USE OF INSULIN: ICD-10-CM

## 2025-08-26 DIAGNOSIS — I10 PRIMARY HYPERTENSION: Primary | ICD-10-CM

## 2025-08-26 DIAGNOSIS — Z79.4 TYPE 2 DIABETES MELLITUS WITH OTHER SPECIFIED COMPLICATION, WITH LONG-TERM CURRENT USE OF INSULIN: ICD-10-CM

## 2025-08-26 DIAGNOSIS — F41.9 ANXIETY: ICD-10-CM

## 2025-08-26 DIAGNOSIS — Z11.59 ENCOUNTER FOR HEPATITIS C SCREENING TEST FOR LOW RISK PATIENT: ICD-10-CM

## 2025-08-26 PROCEDURE — 99214 OFFICE O/P EST MOD 30 MIN: CPT | Performed by: NURSE PRACTITIONER

## 2025-08-26 RX ORDER — AMLODIPINE BESYLATE 10 MG/1
10 TABLET ORAL DAILY
COMMUNITY
End: 2025-08-26 | Stop reason: SDUPTHER

## 2025-08-26 RX ORDER — ESCITALOPRAM OXALATE 20 MG/1
20 TABLET ORAL DAILY
Qty: 90 TABLET | Refills: 3 | Status: SHIPPED | OUTPATIENT
Start: 2025-08-26

## 2025-08-26 RX ORDER — LEVOTHYROXINE SODIUM 200 UG/1
200 TABLET ORAL DAILY
Qty: 90 TABLET | Refills: 1 | Status: SHIPPED | OUTPATIENT
Start: 2025-08-26

## 2025-08-26 RX ORDER — MULTIPLE VITAMINS W/ MINERALS TAB 9MG-400MCG
1 TAB ORAL DAILY
COMMUNITY

## 2025-08-26 RX ORDER — ORAL SEMAGLUTIDE 14 MG/1
14 TABLET ORAL DAILY
COMMUNITY

## 2025-08-26 RX ORDER — FUROSEMIDE 20 MG/1
20 TABLET ORAL DAILY
COMMUNITY
Start: 2025-08-11 | End: 2026-02-07

## 2025-08-26 RX ORDER — ATORVASTATIN CALCIUM 20 MG/1
20 TABLET, FILM COATED ORAL NIGHTLY
Qty: 90 TABLET | Refills: 1 | Status: SHIPPED | OUTPATIENT
Start: 2025-08-26

## 2025-08-26 RX ORDER — BUSPIRONE HYDROCHLORIDE 10 MG/1
10 TABLET ORAL 2 TIMES DAILY
Qty: 180 TABLET | Refills: 3 | Status: SHIPPED | OUTPATIENT
Start: 2025-08-26

## 2025-08-26 RX ORDER — AMLODIPINE BESYLATE 10 MG/1
10 TABLET ORAL DAILY
Qty: 90 TABLET | Refills: 1 | Status: SHIPPED | OUTPATIENT
Start: 2025-08-26

## 2025-08-27 LAB — HCV IGG SERPL QL IA: NON REACTIVE

## (undated) DEVICE — ENDOPATH PNEUMONEEDLE INSUFFLATION NEEDLES WITH LUER LOCK CONNECTORS 120MM: Brand: ENDOPATH

## (undated) DEVICE — FRCP BX RADJAW4 NDL 2.8 240CM LG OG BX40

## (undated) DEVICE — DRAPE,REIN 53X77,STERILE: Brand: MEDLINE

## (undated) DEVICE — ENDOPATH XCEL BLADELESS TROCARS WITH STABILITY SLEEVES: Brand: ENDOPATH XCEL

## (undated) DEVICE — SUT VIC 0 TN 27IN DYED JTN0G

## (undated) DEVICE — ENDOCUT SCISSOR TIP, DISPOSABLE: Brand: RENEW

## (undated) DEVICE — STPCK 3WY D201 DISCOFIX

## (undated) DEVICE — BITEBLOCK OMNI BLOC

## (undated) DEVICE — SENSR O2 OXIMAX FNGR A/ 18IN NONSTR

## (undated) DEVICE — SOL NACL 0.9PCT 1000ML

## (undated) DEVICE — SUT MNCRYL PLS ANTIB UD 4/0 PS2 18IN

## (undated) DEVICE — TUBING, SUCTION, 1/4" X 10', STRAIGHT: Brand: MEDLINE

## (undated) DEVICE — Device: Brand: DEFENDO AIR/WATER/SUCTION AND BIOPSY VALVE

## (undated) DEVICE — CANNULA,ADULT,SOFT-TOUCH,7'TUBE,UC: Brand: PENDING

## (undated) DEVICE — EXTENSION SET, MALE LUER LOCK ADAPTER WITH RETRACTABLE COLLAR

## (undated) DEVICE — 3M™ STERI-STRIP™ COMPOUND BENZOIN TINCTURE 40 BAGS/CARTON 4 CARTONS/CASE C1544: Brand: 3M™ STERI-STRIP™

## (undated) DEVICE — CATH IV INSYTE AUTOGARD 14G 1 1/2IN ORNG

## (undated) DEVICE — LOU LAP CHOLE: Brand: MEDLINE INDUSTRIES, INC.

## (undated) DEVICE — DRP C/ARM 41X74IN

## (undated) DEVICE — APPL CHLORAPREP W/TINT 26ML ORNG

## (undated) DEVICE — CATH CHOLANG 4.5F18IN BRGNDY

## (undated) DEVICE — THE TORRENT IRRIGATION SCOPE CONNECTOR IS USED WITH THE TORRENT IRRIGATION TUBING TO PROVIDE IRRIGATION FLUIDS SUCH AS STERILE WATER DURING GASTROINTESTINAL ENDOSCOPIC PROCEDURES WHEN USED IN CONJUNCTION WITH AN IRRIGATION PUMP (OR ELECTROSURGICAL UNIT).: Brand: TORRENT

## (undated) DEVICE — GLV SURG PREMIERPRO ORTHO LTX PF SZ7.5 BRN

## (undated) DEVICE — CONTAINER,SPECIMEN,OR STERILE,4OZ: Brand: MEDLINE

## (undated) DEVICE — CANN NASL CO2 TRULINK W/O2 A/

## (undated) DEVICE — ENDOPOUCH RETRIEVER SPECIMEN RETRIEVAL BAGS: Brand: ENDOPOUCH RETRIEVER

## (undated) DEVICE — ENDOPATH XCEL UNIVERSAL TROCAR STABLILITY SLEEVES: Brand: ENDOPATH XCEL